# Patient Record
Sex: FEMALE | Race: OTHER | Employment: OTHER | URBAN - METROPOLITAN AREA
[De-identification: names, ages, dates, MRNs, and addresses within clinical notes are randomized per-mention and may not be internally consistent; named-entity substitution may affect disease eponyms.]

---

## 2017-01-09 RX ORDER — LOSARTAN POTASSIUM 100 MG/1
TABLET ORAL
Qty: 90 TABLET | Refills: 0 | Status: SHIPPED | OUTPATIENT
Start: 2017-01-09 | End: 2017-04-12

## 2017-01-10 ENCOUNTER — TELEPHONE (OUTPATIENT)
Dept: INTERNAL MEDICINE CLINIC | Facility: CLINIC | Age: 62
End: 2017-01-10

## 2017-02-13 RX ORDER — FUROSEMIDE 20 MG/1
TABLET ORAL
Qty: 90 TABLET | Refills: 0 | Status: SHIPPED | OUTPATIENT
Start: 2017-02-13 | End: 2018-10-30

## 2017-02-16 ENCOUNTER — TELEPHONE (OUTPATIENT)
Dept: INTERNAL MEDICINE CLINIC | Facility: CLINIC | Age: 62
End: 2017-02-16

## 2017-02-16 NOTE — TELEPHONE ENCOUNTER
Gennaro Kennedy MD  Diplomate, American Board of Internal Medicine  [facility]  [address] [facility],4Th Floor, [address], [address], [address] [place] [address]  T: [id number]; F: Chata 5

## 2017-02-16 NOTE — TELEPHONE ENCOUNTER
[facility] called for prior auth on Indomethacin. Prior auth form to be faxed to Dr Daria Hawley.

## 2017-04-10 ENCOUNTER — OFFICE VISIT (OUTPATIENT)
Dept: INTERNAL MEDICINE CLINIC | Facility: CLINIC | Age: 62
End: 2017-04-10

## 2017-04-10 ENCOUNTER — TELEPHONE (OUTPATIENT)
Dept: INTERNAL MEDICINE CLINIC | Facility: CLINIC | Age: 62
End: 2017-04-10

## 2017-04-10 VITALS
OXYGEN SATURATION: 95 % | BODY MASS INDEX: 33 KG/M2 | RESPIRATION RATE: 13 BRPM | SYSTOLIC BLOOD PRESSURE: 130 MMHG | WEIGHT: 205 LBS | HEART RATE: 78 BPM | TEMPERATURE: 98 F | DIASTOLIC BLOOD PRESSURE: 84 MMHG

## 2017-04-10 DIAGNOSIS — N39.0 ACUTE UTI: Primary | ICD-10-CM

## 2017-04-10 PROCEDURE — 87086 URINE CULTURE/COLONY COUNT: CPT | Performed by: INTERNAL MEDICINE

## 2017-04-10 PROCEDURE — 99213 OFFICE O/P EST LOW 20 MIN: CPT | Performed by: INTERNAL MEDICINE

## 2017-04-10 RX ORDER — PHENAZOPYRIDINE HYDROCHLORIDE 200 MG/1
200 TABLET, FILM COATED ORAL 3 TIMES DAILY PRN
Qty: 9 TABLET | Refills: 0 | Status: SHIPPED | OUTPATIENT
Start: 2017-04-10 | End: 2017-06-13 | Stop reason: ALTCHOICE

## 2017-04-10 RX ORDER — LEVOFLOXACIN 500 MG/1
500 TABLET, FILM COATED ORAL DAILY
Qty: 3 TABLET | Refills: 0 | Status: SHIPPED | OUTPATIENT
Start: 2017-04-10 | End: 2017-04-20

## 2017-04-10 NOTE — PROGRESS NOTES
Patient presents with:  UTI: since Friday/   Low Back Pain      HPI: The pt presents today for 3 days of urinary urgency, frequency, dysuria, and some low back pain. She did have brief (1 day and 1 episode) of hematuria.   She's had UTI in the past, but sh plan as outlined above. Patient was also afforded the time and opportunity to ask questions, which were then answered to the best of my ability. Malachi Love. Trinh Us MD  Diplomate, American Board of Internal Medicine  [facility]  [address] [place]

## 2017-04-12 RX ORDER — LOSARTAN POTASSIUM 100 MG/1
TABLET ORAL
Qty: 90 TABLET | Refills: 0 | Status: SHIPPED | OUTPATIENT
Start: 2017-04-12 | End: 2017-09-03

## 2017-04-14 ENCOUNTER — APPOINTMENT (OUTPATIENT)
Dept: CT IMAGING | Age: 62
End: 2017-04-14
Attending: EMERGENCY MEDICINE
Payer: MEDICARE

## 2017-04-14 ENCOUNTER — HOSPITAL ENCOUNTER (EMERGENCY)
Age: 62
Discharge: HOME | End: 2017-04-14
Attending: EMERGENCY MEDICINE
Payer: MEDICARE

## 2017-04-14 VITALS
HEIGHT: 66 IN | SYSTOLIC BLOOD PRESSURE: 120 MMHG | HEART RATE: 68 BPM | BODY MASS INDEX: 32.14 KG/M2 | WEIGHT: 200 LBS | RESPIRATION RATE: 18 BRPM | DIASTOLIC BLOOD PRESSURE: 66 MMHG | TEMPERATURE: 98 F | OXYGEN SATURATION: 100 %

## 2017-04-14 DIAGNOSIS — N23 RENAL COLIC: ICD-10-CM

## 2017-04-14 DIAGNOSIS — N13.2 HYDRONEPHROSIS WITH URINARY OBSTRUCTION DUE TO RENAL CALCULUS: ICD-10-CM

## 2017-04-14 DIAGNOSIS — N20.0 KIDNEY STONE: Primary | ICD-10-CM

## 2017-04-14 PROCEDURE — 74176 CT ABD & PELVIS W/O CONTRAST: CPT

## 2017-04-14 PROCEDURE — 81001 URINALYSIS AUTO W/SCOPE: CPT | Performed by: EMERGENCY MEDICINE

## 2017-04-14 PROCEDURE — 96375 TX/PRO/DX INJ NEW DRUG ADDON: CPT

## 2017-04-14 PROCEDURE — 99284 EMERGENCY DEPT VISIT MOD MDM: CPT

## 2017-04-14 PROCEDURE — 80048 BASIC METABOLIC PNL TOTAL CA: CPT | Performed by: EMERGENCY MEDICINE

## 2017-04-14 PROCEDURE — 96374 THER/PROPH/DIAG INJ IV PUSH: CPT

## 2017-04-14 PROCEDURE — 85025 COMPLETE CBC W/AUTO DIFF WBC: CPT | Performed by: EMERGENCY MEDICINE

## 2017-04-14 PROCEDURE — 96361 HYDRATE IV INFUSION ADD-ON: CPT

## 2017-04-14 PROCEDURE — 99285 EMERGENCY DEPT VISIT HI MDM: CPT

## 2017-04-14 RX ORDER — ONDANSETRON 2 MG/ML
4 INJECTION INTRAMUSCULAR; INTRAVENOUS ONCE
Status: COMPLETED | OUTPATIENT
Start: 2017-04-14 | End: 2017-04-14

## 2017-04-14 RX ORDER — ONDANSETRON 4 MG/1
4 TABLET, ORALLY DISINTEGRATING ORAL EVERY 4 HOURS PRN
Qty: 10 TABLET | Refills: 0 | Status: SHIPPED | OUTPATIENT
Start: 2017-04-14 | End: 2017-04-21

## 2017-04-14 RX ORDER — HYDROMORPHONE HYDROCHLORIDE 1 MG/ML
0.5 INJECTION, SOLUTION INTRAMUSCULAR; INTRAVENOUS; SUBCUTANEOUS ONCE
Status: COMPLETED | OUTPATIENT
Start: 2017-04-14 | End: 2017-04-14

## 2017-04-14 RX ORDER — CEPHALEXIN 500 MG/1
500 CAPSULE ORAL 2 TIMES DAILY
Qty: 40 CAPSULE | Refills: 0 | Status: SHIPPED | OUTPATIENT
Start: 2017-04-14 | End: 2017-04-19

## 2017-04-14 RX ORDER — HYDROCODONE BITARTRATE AND ACETAMINOPHEN 5; 325 MG/1; MG/1
1-2 TABLET ORAL EVERY 4 HOURS PRN
Qty: 20 TABLET | Refills: 0 | Status: SHIPPED | OUTPATIENT
Start: 2017-04-14 | End: 2017-06-13 | Stop reason: ALTCHOICE

## 2017-04-14 RX ORDER — KETOROLAC TROMETHAMINE 30 MG/ML
30 INJECTION, SOLUTION INTRAMUSCULAR; INTRAVENOUS ONCE
Status: COMPLETED | OUTPATIENT
Start: 2017-04-14 | End: 2017-04-14

## 2017-04-14 NOTE — ED INITIAL ASSESSMENT (HPI)
Pt has had r flank pain radiating to her r abdomen, pt has had kidney stones in the past about 6 yrs ago. Pt states she has had blood in her urine.

## 2017-04-14 NOTE — ED PROVIDER NOTES
[facility] [place]    History   Patient presents with:  Abdomen/Flank Pain (GI/)    Stated Complaint: recent kidney infection, left flank pain    HPI    Patient is a 68-year-old female who has a history of remote TAKE 1 TABLET BY MOUTH ONCE DAILY. Phenazopyridine HCl 200 MG Oral Tab,  Take 1 tablet (200 mg total) by mouth 3 (three) times daily as needed for Pain.    FUROSEMIDE 20 MG Oral Tab,  TAKE 1 TABLET BY MOUTH EVERY DAY AS NEEDED FOR LEG SWELLING   BETAMETH 04/14/17 1023 20   Temp 04/14/17 1023 97.5 °F (36.4 °C)   Temp src 04/14/17 1023 Temporal   SpO2 04/14/17 1023 100 %   O2 Device 04/14/17 1023 None (Room air)       Current:/66 mmHg  Pulse 68  Temp(Src) 97.5 °F (36.4 °C) (Temporal)  Resp 18  Ht 167. 6 these tests on the individual orders. CT scan abdomen pelvis: Left distal 5 mm stone. Right renal nonobstructing stones. Report reviewed and discussed with patient  MDM   Patient with flank pain, hematuria.   Possible kidney stone versus partial treat

## 2017-05-01 ENCOUNTER — OFFICE VISIT (OUTPATIENT)
Dept: INTERNAL MEDICINE CLINIC | Facility: CLINIC | Age: 62
End: 2017-05-01

## 2017-05-01 VITALS
BODY MASS INDEX: 33.5 KG/M2 | HEIGHT: 65.5 IN | SYSTOLIC BLOOD PRESSURE: 116 MMHG | DIASTOLIC BLOOD PRESSURE: 78 MMHG | TEMPERATURE: 98 F | RESPIRATION RATE: 16 BRPM | OXYGEN SATURATION: 96 % | WEIGHT: 203.5 LBS | HEART RATE: 82 BPM

## 2017-05-01 DIAGNOSIS — N20.0 NEPHROLITHIASIS: ICD-10-CM

## 2017-05-01 DIAGNOSIS — I10 ESSENTIAL HYPERTENSION: ICD-10-CM

## 2017-05-01 DIAGNOSIS — Z01.818 PREOPERATIVE EXAMINATION: Primary | ICD-10-CM

## 2017-05-01 DIAGNOSIS — E89.0 POSTOPERATIVE HYPOTHYROIDISM: ICD-10-CM

## 2017-05-01 DIAGNOSIS — E66.9 OBESITY, CLASS I, BMI 30-34.9: ICD-10-CM

## 2017-05-01 PROCEDURE — 99214 OFFICE O/P EST MOD 30 MIN: CPT | Performed by: INTERNAL MEDICINE

## 2017-05-01 PROCEDURE — 93000 ELECTROCARDIOGRAM COMPLETE: CPT | Performed by: INTERNAL MEDICINE

## 2017-05-01 RX ORDER — TACROLIMUS 1 MG/G
OINTMENT TOPICAL
Refills: 0 | COMMUNITY
Start: 2017-03-10 | End: 2017-05-01 | Stop reason: ALTCHOICE

## 2017-05-01 RX ORDER — LEVOTHYROXINE SODIUM 175 MCG
175 TABLET ORAL
COMMUNITY
Start: 2017-04-10 | End: 2018-07-26

## 2017-05-01 RX ORDER — PROCHLORPERAZINE MALEATE 10 MG
TABLET ORAL
COMMUNITY
Start: 2017-04-14 | End: 2017-05-01 | Stop reason: ALTCHOICE

## 2017-05-01 NOTE — PROGRESS NOTES
Ana María Barrera is a 64year old female who presents for a pre-operative physical exam.   Ana María Barrera is scheduled for a cystoscopy,ureteroscopy procedure at [facility] on 5/4/17 performed by Dr Duke Elam, who has requested that I provide pre-opera yrs ago   • Kidney stone    • Anesthesia complication    • PONV (postoperative nausea and vomiting)      Past Surgical History   Procedure Laterality Date   • Other surgical history  1/1/2005     thyroid surgery XRT   • Sherman needle localization w/ specimen (two) times daily as needed (pain). , Disp: 180 capsule, Rfl: 1  •  Esomeprazole Magnesium (NEXIUM) 40 MG Oral Capsule Delayed Release, Take 1 capsule (40 mg total) by mouth every morning before breakfast., Disp: 30 capsule, Rfl: 5  •  alprazolam 0.25 MG Or an intermediate risk procedure, and is ok to proceed with surgery without any further testing. Note and pertinent pre-operative testing results will be faxed to referring surgeon's office and/or surgical center as requested.     2.  Nephrolithiasis  Bilate

## 2017-05-04 ENCOUNTER — HOSPITAL ENCOUNTER (OUTPATIENT)
Facility: HOSPITAL | Age: 62
Setting detail: HOSPITAL OUTPATIENT SURGERY
Discharge: HOME | End: 2017-05-04
Attending: UROLOGY | Admitting: UROLOGY
Payer: MEDICARE

## 2017-05-04 ENCOUNTER — APPOINTMENT (OUTPATIENT)
Dept: GENERAL RADIOLOGY | Facility: HOSPITAL | Age: 62
End: 2017-05-04
Attending: UROLOGY
Payer: MEDICARE

## 2017-05-04 ENCOUNTER — SURGERY (OUTPATIENT)
Age: 62
End: 2017-05-04

## 2017-05-04 VITALS
HEIGHT: 66 IN | OXYGEN SATURATION: 100 % | HEART RATE: 75 BPM | BODY MASS INDEX: 32.59 KG/M2 | DIASTOLIC BLOOD PRESSURE: 73 MMHG | RESPIRATION RATE: 18 BRPM | SYSTOLIC BLOOD PRESSURE: 132 MMHG | WEIGHT: 202.81 LBS | TEMPERATURE: 97 F

## 2017-05-04 DIAGNOSIS — R10.9 LEFT FLANK PAIN: Primary | ICD-10-CM

## 2017-05-04 PROCEDURE — 0TJ98ZZ INSPECTION OF URETER, VIA NATURAL OR ARTIFICIAL OPENING ENDOSCOPIC: ICD-10-PCS | Performed by: UROLOGY

## 2017-05-04 PROCEDURE — 74420 UROGRAPHY RTRGR +-KUB: CPT | Performed by: UROLOGY

## 2017-05-04 RX ORDER — HYDROMORPHONE HYDROCHLORIDE 1 MG/ML
0.4 INJECTION, SOLUTION INTRAMUSCULAR; INTRAVENOUS; SUBCUTANEOUS EVERY 5 MIN PRN
Status: DISCONTINUED | OUTPATIENT
Start: 2017-05-04 | End: 2017-05-04

## 2017-05-04 RX ORDER — NALOXONE HYDROCHLORIDE 0.4 MG/ML
80 INJECTION, SOLUTION INTRAMUSCULAR; INTRAVENOUS; SUBCUTANEOUS AS NEEDED
Status: DISCONTINUED | OUTPATIENT
Start: 2017-05-04 | End: 2017-05-04

## 2017-05-04 RX ORDER — HYDROMORPHONE HYDROCHLORIDE 1 MG/ML
INJECTION, SOLUTION INTRAMUSCULAR; INTRAVENOUS; SUBCUTANEOUS
Status: COMPLETED
Start: 2017-05-04 | End: 2017-05-04

## 2017-05-04 RX ORDER — MEPERIDINE HYDROCHLORIDE 25 MG/ML
12.5 INJECTION INTRAMUSCULAR; INTRAVENOUS; SUBCUTANEOUS AS NEEDED
Status: DISCONTINUED | OUTPATIENT
Start: 2017-05-04 | End: 2017-05-04

## 2017-05-04 RX ORDER — DIPHENHYDRAMINE HYDROCHLORIDE 50 MG/ML
12.5 INJECTION INTRAMUSCULAR; INTRAVENOUS AS NEEDED
Status: DISCONTINUED | OUTPATIENT
Start: 2017-05-04 | End: 2017-05-04

## 2017-05-04 RX ORDER — ONDANSETRON 2 MG/ML
4 INJECTION INTRAMUSCULAR; INTRAVENOUS AS NEEDED
Status: DISCONTINUED | OUTPATIENT
Start: 2017-05-04 | End: 2017-05-04

## 2017-05-04 RX ORDER — SODIUM CHLORIDE, SODIUM LACTATE, POTASSIUM CHLORIDE, CALCIUM CHLORIDE 600; 310; 30; 20 MG/100ML; MG/100ML; MG/100ML; MG/100ML
INJECTION, SOLUTION INTRAVENOUS CONTINUOUS
Status: DISCONTINUED | OUTPATIENT
Start: 2017-05-04 | End: 2017-05-04

## 2017-05-04 RX ORDER — MORPHINE SULFATE 2 MG/ML
2 INJECTION, SOLUTION INTRAMUSCULAR; INTRAVENOUS EVERY 5 MIN PRN
Status: DISCONTINUED | OUTPATIENT
Start: 2017-05-04 | End: 2017-05-04

## 2017-05-04 RX ORDER — LIDOCAINE HYDROCHLORIDE 20 MG/ML
JELLY TOPICAL AS NEEDED
Status: DISCONTINUED | OUTPATIENT
Start: 2017-05-04 | End: 2017-05-04 | Stop reason: HOSPADM

## 2017-05-04 RX ORDER — METOCLOPRAMIDE HYDROCHLORIDE 5 MG/ML
10 INJECTION INTRAMUSCULAR; INTRAVENOUS AS NEEDED
Status: DISCONTINUED | OUTPATIENT
Start: 2017-05-04 | End: 2017-05-04

## 2017-05-04 NOTE — H&P
History and Physical note by Dr. Gino Rose   from 5/1/17 reviewed. Any changes noted below:    Has not passed stone.   Still has some pains, even occ to the right side  No fever, chills  No nausea  No hematuria, dysuria  But still some left sided pain

## 2017-05-04 NOTE — BRIEF OP NOTE
Pre-Operative Diagnosis: LEFT URETERAL STONE       Post-Operative Diagnosis: LEFT PASSED  CALCULI, RIGHT FLANK PAIN     Procedure Performed:   Procedure(s):  CYSTOSCOPY, LEFT URETEROSCOPY,  BILATERAL RETROGRADE PYELOGRAMS    Surgeon(s) and Role:     * U

## 2017-05-05 NOTE — OPERATIVE REPORT
[facility]    PATIENT'S NAME: Keon Francis   ATTENDING PHYSICIAN: All Bergeron M.D. OPERATING PHYSICIAN: All Bergeron M.D.    PATIENT ACCOUNT#:   [de-identified]    [facility]  MEDICAL RECORD #:   [id number]       DATE OF BIRTH clear.  Panendoscopy of the bladder harbored no lesions. There were no bladder tumors, diverticula, stones, or trabeculation. There was no cystitis.   The ureteral orifices were normal.  The left ureteral orifice was cannulated with an open-ended catheter 22:44:37  [facility] [id number]  [place]/

## 2017-06-08 RX ORDER — INDOMETHACIN 50 MG/1
CAPSULE ORAL
Qty: 180 CAPSULE | Refills: 1 | Status: SHIPPED | OUTPATIENT
Start: 2017-06-08 | End: 2017-06-13

## 2017-06-13 PROBLEM — F51.02 ADJUSTMENT INSOMNIA: Status: ACTIVE | Noted: 2017-06-13

## 2017-06-13 PROBLEM — M47.816 ARTHRITIS, LUMBAR SPINE: Status: ACTIVE | Noted: 2017-06-13

## 2017-06-13 PROBLEM — M54.50 CHRONIC BILATERAL LOW BACK PAIN WITHOUT SCIATICA: Status: ACTIVE | Noted: 2017-06-13

## 2017-06-13 PROBLEM — G89.29 CHRONIC BILATERAL LOW BACK PAIN WITHOUT SCIATICA: Status: ACTIVE | Noted: 2017-06-13

## 2017-06-13 NOTE — PROGRESS NOTES
Patient presents with: Other: Anxiety, insomnia, back pain      HPI: The pt presents today for eval of 3 issues:  1. Anxiety - Onset = relatively recent within the past few months. She received notice that she is to be deported from the [place].   Berto Langston MG Oral Cap Take 1 capsule (50 mg total) by mouth 2 (two) times daily as needed (pain).  Disp: 180 capsule Rfl: 1   Esomeprazole Magnesium (NEXIUM) 40 MG Oral Capsule Delayed Release Take 1 capsule (40 mg total) by mouth every morning before breakfast. Disp [id number] SPINE LUMBOSACRAL COMPL W/ OBL     PROCEDURE:     RADIOGRAPH OF THE LUMBAR SPINE (ROUTINE 5 VIEWS) WITH  OBLIQUE, AP/LATERAL, AND CONED DOWN VIEWS OF L5-S1     TECHNIQUE:     AP, lateral, oblique, and coned down L5-S1 views were  obtained.     CO which work most of the time but not recently. Desires alternate therapy. Not recent falls. Pain severity is moderate to severe and worse w/ loading and ROM of the spine itself. She cannot undergo formal PT at this time b/c of legal fight on issue #1.

## 2017-08-10 ENCOUNTER — TELEPHONE (OUTPATIENT)
Dept: INTERNAL MEDICINE CLINIC | Facility: CLINIC | Age: 62
End: 2017-08-10

## 2017-08-10 NOTE — TELEPHONE ENCOUNTER
Patient called to cancel appt. Explained same day cancellation is a missed appointment. Also explained this is her second NO SHOW within a year and there will be a $50.00 charge. Patient did not reschedule at this time.      First NO SHOW: 08/25/2016  Sec

## 2017-09-05 RX ORDER — LOSARTAN POTASSIUM 100 MG/1
TABLET ORAL
Qty: 90 TABLET | Refills: 0 | Status: SHIPPED | OUTPATIENT
Start: 2017-09-05 | End: 2017-12-07

## 2017-09-28 ENCOUNTER — HOSPITAL ENCOUNTER (OUTPATIENT)
Age: 62
Discharge: HOME | End: 2017-09-28
Attending: FAMILY MEDICINE
Payer: MEDICARE

## 2017-09-28 ENCOUNTER — APPOINTMENT (OUTPATIENT)
Dept: CT IMAGING | Age: 62
End: 2017-09-28
Attending: FAMILY MEDICINE
Payer: MEDICARE

## 2017-09-28 VITALS
WEIGHT: 200 LBS | SYSTOLIC BLOOD PRESSURE: 148 MMHG | OXYGEN SATURATION: 98 % | TEMPERATURE: 98 F | HEART RATE: 63 BPM | BODY MASS INDEX: 33 KG/M2 | RESPIRATION RATE: 18 BRPM | DIASTOLIC BLOOD PRESSURE: 77 MMHG

## 2017-09-28 DIAGNOSIS — R11.2 NAUSEA AND VOMITING IN ADULT: ICD-10-CM

## 2017-09-28 DIAGNOSIS — R42 DIZZINESS: Primary | ICD-10-CM

## 2017-09-28 DIAGNOSIS — R51.9 ACUTE NONINTRACTABLE HEADACHE, UNSPECIFIED HEADACHE TYPE: ICD-10-CM

## 2017-09-28 PROCEDURE — 70450 CT HEAD/BRAIN W/O DYE: CPT | Performed by: FAMILY MEDICINE

## 2017-09-28 PROCEDURE — 80047 BASIC METABLC PNL IONIZED CA: CPT

## 2017-09-28 PROCEDURE — 96360 HYDRATION IV INFUSION INIT: CPT

## 2017-09-28 PROCEDURE — 99215 OFFICE O/P EST HI 40 MIN: CPT

## 2017-09-28 PROCEDURE — 99205 OFFICE O/P NEW HI 60 MIN: CPT

## 2017-09-28 PROCEDURE — 93005 ELECTROCARDIOGRAM TRACING: CPT

## 2017-09-28 PROCEDURE — 93010 ELECTROCARDIOGRAM REPORT: CPT

## 2017-09-28 PROCEDURE — 85025 COMPLETE CBC W/AUTO DIFF WBC: CPT | Performed by: FAMILY MEDICINE

## 2017-09-28 RX ORDER — ONDANSETRON 4 MG/1
TABLET, ORALLY DISINTEGRATING ORAL
Qty: 12 TABLET | Refills: 0 | Status: SHIPPED | OUTPATIENT
Start: 2017-09-28 | End: 2018-02-20

## 2017-09-28 RX ORDER — ONDANSETRON 4 MG/1
4 TABLET, ORALLY DISINTEGRATING ORAL ONCE
Status: COMPLETED | OUTPATIENT
Start: 2017-09-28 | End: 2017-09-28

## 2017-09-28 RX ORDER — SODIUM CHLORIDE 9 MG/ML
1000 INJECTION, SOLUTION INTRAVENOUS ONCE
Status: COMPLETED | OUTPATIENT
Start: 2017-09-28 | End: 2017-09-28

## 2017-09-28 RX ORDER — ACETAMINOPHEN 500 MG
1000 TABLET ORAL ONCE
Status: COMPLETED | OUTPATIENT
Start: 2017-09-28 | End: 2017-09-28

## 2017-09-28 RX ORDER — MECLIZINE HCL 12.5 MG/1
25 TABLET ORAL ONCE
Status: COMPLETED | OUTPATIENT
Start: 2017-09-28 | End: 2017-09-28

## 2017-09-28 RX ORDER — MECLIZINE HYDROCHLORIDE 25 MG/1
25 TABLET ORAL 3 TIMES DAILY PRN
Qty: 30 TABLET | Refills: 0 | Status: SHIPPED | OUTPATIENT
Start: 2017-09-28 | End: 2018-02-20

## 2017-09-28 NOTE — ED PROVIDER NOTES
Patient Seen in: [facility]    History   Patient presents with:  Blood Pressure  Dizziness  Headache    Stated Complaint: high blood pressure with dizziness and nausea x 3 days    HPI    This 58-year-old female with a known hist Comment: thyroid surgery XRT    Family History   Problem Relation Age of Onset   • Heart Disease Father    • Heart Disease Mother        Smoking status: Never Smoker                                                              Smokeless tobacco: Never U EKG    Rate, intervals and axes as noted on EKG Report. Rate: 73  Rhythm: Sinus Rhythm  Reading: Normal Sinus Rhythm           Ct Brain Or Head (42361)    Result Date: 9/28/2017  PROCEDURE:  CT BRAIN OR HEAD (80127)  COMPARISON:  None.   INDICATIONS: effects are reviewed. She is instructed to go the emergency room if she has any worsening symptoms. She is to follow-up with her primary doctor in 2-3 days if not improving. Fall precautions are reviewed with the patient.           Disposition and Plan

## 2017-09-28 NOTE — ED INITIAL ASSESSMENT (HPI)
C/o dizziness, headache and nauseous for 3 days. Has elevate blood pressure, room feels like spinning and with some blurry vision.

## 2017-10-03 ENCOUNTER — OFFICE VISIT (OUTPATIENT)
Dept: INTERNAL MEDICINE CLINIC | Facility: CLINIC | Age: 62
End: 2017-10-03

## 2017-10-03 VITALS
DIASTOLIC BLOOD PRESSURE: 78 MMHG | SYSTOLIC BLOOD PRESSURE: 130 MMHG | TEMPERATURE: 98 F | HEIGHT: 65.5 IN | HEART RATE: 88 BPM | OXYGEN SATURATION: 98 % | RESPIRATION RATE: 16 BRPM | WEIGHT: 207.5 LBS | BODY MASS INDEX: 34.16 KG/M2

## 2017-10-03 DIAGNOSIS — F41.1 GAD (GENERALIZED ANXIETY DISORDER): ICD-10-CM

## 2017-10-03 DIAGNOSIS — H40.9 GLAUCOMA, UNSPECIFIED GLAUCOMA TYPE, UNSPECIFIED LATERALITY: ICD-10-CM

## 2017-10-03 DIAGNOSIS — F51.04 CHRONIC INSOMNIA: ICD-10-CM

## 2017-10-03 DIAGNOSIS — R60.9 PERIPHERAL EDEMA: ICD-10-CM

## 2017-10-03 DIAGNOSIS — K21.9 GASTROESOPHAGEAL REFLUX DISEASE WITHOUT ESOPHAGITIS: ICD-10-CM

## 2017-10-03 DIAGNOSIS — Z00.00 ENCOUNTER FOR ANNUAL HEALTH EXAMINATION: Primary | ICD-10-CM

## 2017-10-03 DIAGNOSIS — E66.9 OBESITY, CLASS I, BMI 30-34.9: ICD-10-CM

## 2017-10-03 DIAGNOSIS — Z13.31 DEPRESSION SCREENING: ICD-10-CM

## 2017-10-03 DIAGNOSIS — Z12.31 ENCOUNTER FOR SCREENING MAMMOGRAM FOR BREAST CANCER: ICD-10-CM

## 2017-10-03 DIAGNOSIS — I10 ESSENTIAL HYPERTENSION: ICD-10-CM

## 2017-10-03 DIAGNOSIS — R42 VERTIGO: ICD-10-CM

## 2017-10-03 DIAGNOSIS — E89.0 POSTOPERATIVE HYPOTHYROIDISM: ICD-10-CM

## 2017-10-03 DIAGNOSIS — M85.89 OSTEOPENIA OF MULTIPLE SITES: ICD-10-CM

## 2017-10-03 DIAGNOSIS — M15.9 PRIMARY OSTEOARTHRITIS INVOLVING MULTIPLE JOINTS: ICD-10-CM

## 2017-10-03 PROBLEM — G89.29 CHRONIC BILATERAL LOW BACK PAIN WITHOUT SCIATICA: Status: RESOLVED | Noted: 2017-06-13 | Resolved: 2017-10-03

## 2017-10-03 PROBLEM — M54.50 CHRONIC BILATERAL LOW BACK PAIN WITHOUT SCIATICA: Status: RESOLVED | Noted: 2017-06-13 | Resolved: 2017-10-03

## 2017-10-03 PROBLEM — F51.02 ADJUSTMENT INSOMNIA: Status: RESOLVED | Noted: 2017-06-13 | Resolved: 2017-10-03

## 2017-10-03 PROBLEM — M47.816 ARTHRITIS, LUMBAR SPINE: Status: RESOLVED | Noted: 2017-06-13 | Resolved: 2017-10-03

## 2017-10-03 PROCEDURE — G0444 DEPRESSION SCREEN ANNUAL: HCPCS | Performed by: INTERNAL MEDICINE

## 2017-10-03 PROCEDURE — 99214 OFFICE O/P EST MOD 30 MIN: CPT | Performed by: INTERNAL MEDICINE

## 2017-10-03 PROCEDURE — G0439 PPPS, SUBSEQ VISIT: HCPCS | Performed by: INTERNAL MEDICINE

## 2017-10-03 RX ORDER — CALCITRIOL 0.5 UG/1
0.5 CAPSULE, LIQUID FILLED ORAL
Qty: 90 CAPSULE | Refills: 1 | Status: SHIPPED | OUTPATIENT
Start: 2017-10-03 | End: 2018-02-26

## 2017-10-03 RX ORDER — ESOMEPRAZOLE MAGNESIUM 40 MG/1
40 CAPSULE, DELAYED RELEASE ORAL
Qty: 30 CAPSULE | Refills: 5 | Status: SHIPPED | OUTPATIENT
Start: 2017-10-03 | End: 2018-07-20

## 2017-10-04 NOTE — PROGRESS NOTES
HPI:   Luis M Burr is a 58year old female who presents for a subsequent AWV and 6-month f/u chronic medical conditions and disease burden status eval.  Specifically, her chronic conditions are as follows:    1. HTN - Stable on prescription medication. 05/23/2016   TSH 0.373 03/02/2016   CREATSERUM 0.79 04/14/2017   GLU 99 04/14/2017        CBC  (most recent labs)     Lab Results  Component Value Date   WBC 8.3 04/14/2017   HGB 13.6 04/14/2017   .0 04/14/2017        ALLERGIES:   She has No Known A in joint, ankle and foot (9/14/2011); Personal history of malignant neoplasm of thyroid (11/17/2011); Personal history of urinary (tract) infection (7/21/2011); PONV (postoperative nausea and vomiting); and Screening for malignant neoplasm of the cervix. Acuity: 20/20   Both Eyes Visual Acuity: Corrected Both Eyes Chart Acuity: 20/20   Able To Tolerate Visual Acuity: Yes      Gen - A&Ox3, NAD, obese  HEENT - PERRL, EOMI, OP is clear; TMs clear B  Neck - supple, no JVD, no thyromegaly; 2+ carotids and no br aspirin. I've never advised her to do so. Diet assessment: Fair     Advanced Directive:  Living Will on file in [place]? Camille Jernigan does not have a Living Will on file in [place]. Resources given.        Healthcare Power of  on file in Epic: you are a male age 38-65 or a female age 47-67, do you take aspirin?: No    Have you had any immunizations at another office such as Influenza, Hepatitis B, Tetanus, or Pneumococcal?: No     Functional Ability     Bathing or Showering: Able without help Correct    Recall \"Ball\": Correct    Recall \"Flag\": Correct    Recall \"Tree\":  Incorrect       This section provided for quick review of chart, separate sheet to patient  [address] Internal Lab or Procedure Extern applicable)     Influenza  Covered Annually  Please get every year    Pneumococcal 13 (Prevnar)  Covered Once after 65 No vaccine history found Please get once after your 65th birthday    Pneumococcal 23 (Pneumovax)  Covered Once after 65 No vaccine histor 08/22/2013 86     LDL Cholesterol (mg/dL)   Date Value   05/23/2016 85    No flowsheet data found. Dilated Eye exam  Annually No flowsheet data found. No flowsheet data found.     COPD      Spirometry Testing Annually Spirometry date:  No flowsheet d

## 2017-10-09 ENCOUNTER — APPOINTMENT (OUTPATIENT)
Dept: LAB | Age: 62
End: 2017-10-09
Attending: INTERNAL MEDICINE
Payer: MEDICARE

## 2017-10-09 ENCOUNTER — HOSPITAL ENCOUNTER (OUTPATIENT)
Dept: MAMMOGRAPHY | Age: 62
Discharge: HOME | End: 2017-10-09
Attending: INTERNAL MEDICINE
Payer: MEDICARE

## 2017-10-09 DIAGNOSIS — M85.89 OSTEOPENIA OF MULTIPLE SITES: ICD-10-CM

## 2017-10-09 DIAGNOSIS — I10 ESSENTIAL HYPERTENSION: ICD-10-CM

## 2017-10-09 DIAGNOSIS — Z12.31 ENCOUNTER FOR SCREENING MAMMOGRAM FOR BREAST CANCER: ICD-10-CM

## 2017-10-09 DIAGNOSIS — E89.0 POSTOPERATIVE HYPOTHYROIDISM: ICD-10-CM

## 2017-10-09 PROCEDURE — 82043 UR ALBUMIN QUANTITATIVE: CPT

## 2017-10-09 PROCEDURE — 36415 COLL VENOUS BLD VENIPUNCTURE: CPT

## 2017-10-09 PROCEDURE — 77067 SCR MAMMO BI INCL CAD: CPT | Performed by: INTERNAL MEDICINE

## 2017-10-09 PROCEDURE — 82306 VITAMIN D 25 HYDROXY: CPT

## 2017-10-09 PROCEDURE — 84443 ASSAY THYROID STIM HORMONE: CPT

## 2017-10-09 PROCEDURE — 84439 ASSAY OF FREE THYROXINE: CPT

## 2017-10-09 PROCEDURE — 80053 COMPREHEN METABOLIC PANEL: CPT

## 2017-10-09 PROCEDURE — 77063 BREAST TOMOSYNTHESIS BI: CPT | Performed by: INTERNAL MEDICINE

## 2017-10-09 PROCEDURE — 80061 LIPID PANEL: CPT

## 2017-10-09 PROCEDURE — 82570 ASSAY OF URINE CREATININE: CPT

## 2017-11-02 ENCOUNTER — OFFICE VISIT (OUTPATIENT)
Dept: NEUROLOGY | Facility: CLINIC | Age: 62
End: 2017-11-02

## 2017-11-02 VITALS
SYSTOLIC BLOOD PRESSURE: 122 MMHG | WEIGHT: 207.5 LBS | HEIGHT: 65.5 IN | BODY MASS INDEX: 34.16 KG/M2 | OXYGEN SATURATION: 97 % | DIASTOLIC BLOOD PRESSURE: 80 MMHG | RESPIRATION RATE: 18 BRPM | TEMPERATURE: 98 F | HEART RATE: 93 BPM

## 2017-11-02 DIAGNOSIS — R51.9 NEW ONSET HEADACHE: ICD-10-CM

## 2017-11-02 DIAGNOSIS — R42 VERTIGO: ICD-10-CM

## 2017-11-02 DIAGNOSIS — R42 POSTURAL DIZZINESS: Primary | ICD-10-CM

## 2017-11-02 PROCEDURE — 99204 OFFICE O/P NEW MOD 45 MIN: CPT | Performed by: OTHER

## 2017-11-02 NOTE — PROGRESS NOTES
HPI:    Patient ID: Miguel Chow is a 58year old female. PCP: Dr Rockne Najjar    Thank you for requesting this consultation to us. Below is the summary of my evaluation.     HPI    Miguel Chow is a 58year old pleasant female with history of Hypertension HISTORY      Comment: thyroid surgery XRT   Family History   Problem Relation Age of Onset   • Heart Disease Father    • Heart Disease Mother       Smoking status: Never Smoker                                                              Smokeless tobacco: times daily as needed (pain). Disp: 180 capsule Rfl: 1   alprazolam 0.25 MG Oral Tab Take 1 tablet (0.25 mg total) by mouth 2 (two) times daily as needed for Sleep or Anxiety.  Disp: 60 tablet Rfl: 5   Cyclobenzaprine HCl 10 MG Oral Tab Take 1 tablet (10 mg difficulty with tandem walk. TESTS/IMAGING:     CT head ( 9/28/2017)  FINDINGS:       VENTRICLES/SULCI:   Ventricles and sulci are normal in size. INTRACRANIAL:  There are no abnormal extraaxial fluid collections. There is no midline shift.   Ther

## 2017-11-07 ENCOUNTER — TELEPHONE (OUTPATIENT)
Dept: SURGERY | Facility: CLINIC | Age: 62
End: 2017-11-07

## 2017-11-07 NOTE — TELEPHONE ENCOUNTER
This is Dr. Angel Luis Garza patient. Pt requesting that MRI brain be done at the open MRI. Typically, open MRI is not desired for brain testing, as it is not as detailed. Contacted patient to determine why she is requesting open MRI.   She states she is cla

## 2017-11-08 NOTE — TELEPHONE ENCOUNTER
Per Dr Gabe Hernandez: Patient can ask for [facility] open MRI. LM on Vickie's vm informing her open MRI ok.

## 2017-11-17 ENCOUNTER — HOSPITAL ENCOUNTER (OUTPATIENT)
Dept: MRI IMAGING | Age: 62
Discharge: HOME | End: 2017-11-17
Attending: Other
Payer: MEDICARE

## 2017-11-17 DIAGNOSIS — R51.9 NEW ONSET HEADACHE: ICD-10-CM

## 2017-11-17 DIAGNOSIS — R42 VERTIGO: ICD-10-CM

## 2017-11-17 PROCEDURE — A9575 INJ GADOTERATE MEGLUMI 0.1ML: HCPCS | Performed by: OTHER

## 2017-11-17 PROCEDURE — 70553 MRI BRAIN STEM W/O & W/DYE: CPT | Performed by: OTHER

## 2017-11-20 ENCOUNTER — TELEPHONE (OUTPATIENT)
Dept: NEUROLOGY | Facility: CLINIC | Age: 62
End: 2017-11-20

## 2017-11-20 NOTE — TELEPHONE ENCOUNTER
----- Message from Yaritza Torres MD sent at 11/20/2017 10:59 AM CST -----  No acute infarction. Possible small meningioma in the right parietal area and not the cause of any of her symptoms.

## 2017-11-20 NOTE — TELEPHONE ENCOUNTER
Patient informed of below. Patient asking what is causing her symptoms. Advised follow up to discuss with Dr Rodney Mayberry further. Transferred to  to schedule.

## 2017-12-08 RX ORDER — LOSARTAN POTASSIUM 100 MG/1
TABLET ORAL
Qty: 90 TABLET | Refills: 0 | Status: SHIPPED | OUTPATIENT
Start: 2017-12-08 | End: 2018-02-20

## 2017-12-08 NOTE — TELEPHONE ENCOUNTER
Medication passed protocol.      Requesting Losartan 100 mg tabs   LOV: 10/3/17  Annual wellness exam  RTC: 6 months  Last Relevant Labs: CMP 10/9/17  Filled: Last sent as 90 with no refills on 9/5/17    Future Appointments  Date Time Provider Department Ce

## 2017-12-12 ENCOUNTER — OFFICE VISIT (OUTPATIENT)
Dept: NEUROLOGY | Facility: CLINIC | Age: 62
End: 2017-12-12

## 2017-12-12 VITALS
DIASTOLIC BLOOD PRESSURE: 80 MMHG | RESPIRATION RATE: 16 BRPM | WEIGHT: 210 LBS | BODY MASS INDEX: 34 KG/M2 | SYSTOLIC BLOOD PRESSURE: 138 MMHG | HEART RATE: 92 BPM

## 2017-12-12 DIAGNOSIS — Z86.73 OLD LACUNAR STROKE WITHOUT LATE EFFECT: ICD-10-CM

## 2017-12-12 DIAGNOSIS — R42 DIZZINESS: ICD-10-CM

## 2017-12-12 DIAGNOSIS — D32.9 MENINGIOMA (HCC): Primary | ICD-10-CM

## 2017-12-12 PROCEDURE — 99213 OFFICE O/P EST LOW 20 MIN: CPT | Performed by: OTHER

## 2017-12-12 NOTE — PATIENT INSTRUCTIONS
Refill policies:    • Allow 2-3 business days for refills; controlled substances may take longer.   • Contact your pharmacy at least 5 days prior to running out of medication and have them send an electronic request or submit request through the [facility] have a procedure or additional testing performed. [facility]) will contact your insurance carrier to obtain pre-certification or prior authorization.     Unfortunately, CHERELLE has seen an increase in denial of payment even though the p

## 2017-12-12 NOTE — PROGRESS NOTES
HPI:    Patient ID: Gloria Albert is a 58year old female. Dizziness   Pertinent negatives include no headaches, numbness or weakness. Patient presented for follow up for dizziness and vertigo.  States dizziness has improved and no longer had any v EDW  4/18/16: KNEE REPLACEMENT SURGERY Left      Comment: Performed by Dr. Berta Marion @ [facility]  35yrs back: [facility] NEEDLE LOCALIZATION W/ SPECIMEN 1 SITE LEFT      Comment: due to build up of milk duct.   1/1/2005: OTHER SURGICAL H total) by mouth 2 (two) times daily as needed (pain). Disp: 180 capsule Rfl: 1   alprazolam 0.25 MG Oral Tab Take 1 tablet (0.25 mg total) by mouth 2 (two) times daily as needed for Sleep or Anxiety.  Disp: 60 tablet Rfl: 5   Cyclobenzaprine HCl 10 MG Oral effect  Dizziness      MRI brain with and without contrast reviewed. Tiny right parietal meningioma, incidentally and not causing her any symptoms. Dizziness has improved significantly.  We recommend observation and repeat MRI brain in a year to assess stab

## 2017-12-25 DIAGNOSIS — F41.1 GAD (GENERALIZED ANXIETY DISORDER): ICD-10-CM

## 2017-12-26 NOTE — TELEPHONE ENCOUNTER
Alprazolam .25 mg  Filled 7-28-16 60 with 5 refills     Indomethacin 50 mg fileld 10-4-16 180 with 1 refill     LOV 10-3-17     Follow up in 6 months     Labs 10-9-17

## 2017-12-28 RX ORDER — INDOMETHACIN 50 MG/1
CAPSULE ORAL
Qty: 180 CAPSULE | Refills: 1 | Status: SHIPPED | OUTPATIENT
Start: 2017-12-28 | End: 2018-02-20

## 2017-12-28 RX ORDER — ALPRAZOLAM 0.25 MG/1
TABLET ORAL
Qty: 60 TABLET | Refills: 5 | Status: SHIPPED | OUTPATIENT
Start: 2017-12-28 | End: 2019-09-19

## 2018-01-23 ENCOUNTER — OFFICE VISIT (OUTPATIENT)
Dept: INTERNAL MEDICINE CLINIC | Facility: CLINIC | Age: 63
End: 2018-01-23

## 2018-01-23 VITALS
RESPIRATION RATE: 16 BRPM | DIASTOLIC BLOOD PRESSURE: 82 MMHG | TEMPERATURE: 98 F | SYSTOLIC BLOOD PRESSURE: 124 MMHG | OXYGEN SATURATION: 96 % | HEART RATE: 76 BPM | BODY MASS INDEX: 34 KG/M2 | WEIGHT: 207.5 LBS

## 2018-01-23 DIAGNOSIS — I10 ESSENTIAL HYPERTENSION: ICD-10-CM

## 2018-01-23 DIAGNOSIS — M85.89 OSTEOPENIA OF MULTIPLE SITES: ICD-10-CM

## 2018-01-23 DIAGNOSIS — Z86.73 HISTORY OF ARTERIAL ISCHEMIC STROKE: Primary | ICD-10-CM

## 2018-01-23 DIAGNOSIS — E89.0 POSTOPERATIVE HYPOTHYROIDISM: ICD-10-CM

## 2018-01-23 PROCEDURE — 99214 OFFICE O/P EST MOD 30 MIN: CPT | Performed by: INTERNAL MEDICINE

## 2018-01-23 RX ORDER — ATORVASTATIN CALCIUM 40 MG/1
40 TABLET, FILM COATED ORAL NIGHTLY
Qty: 90 TABLET | Refills: 3 | Status: SHIPPED | OUTPATIENT
Start: 2018-01-23 | End: 2018-07-20

## 2018-01-23 NOTE — PATIENT INSTRUCTIONS
Mis Doins,  1. El \"stress test\" del maya. 2. Vamos con la medicina nueva para no meterse con embolia y se llama ATORVASTATIN de 40 miligramas cada estephania. 3. El laboratorio en la primavera ([address] [place]).   4. La proxima praveen sera florecita semana despues de

## 2018-01-24 NOTE — PROGRESS NOTES
Patient presents with: Follow - Up: from seeing neurologist   Other: HTN, HypoTSH, Osteopenia      HPI: The pt presents today for eval of multiple issues:  1.  Hx of ischemic stroke - She was asymptomatic, but this was seen via MRI in 11/2017 in the [address] [place] [address] capsule Rfl: 1   Meclizine HCl 25 MG Oral Tab Take 1 tablet (25 mg total) by mouth 3 (three) times daily as needed for Dizziness.  Disp: 30 tablet Rfl: 0   SYNTHROID 175 MCG Oral Tab  Disp:  Rfl:    FUROSEMIDE 20 MG Oral Tab TAKE 1 TABLET BY MOUTH EVERY DAY clear  Neck - supple, no JVD, no thyromegaly; 2+ carotids and no bruits  Lungs - CTAB  CV - RRR, nl s1, s2  Abd - soft, NABS, NT, ND  Ext - no c/c/e  Neuro - CNs 2-12 grossly intact, no focal deficits; 2+ DTRs  Psych - nl mood/affect      A/P:  History of Imaging & Consults:  CARD TREADMILL STRESS, ADULT (CPT=93017)

## 2018-01-26 RX ORDER — INDOMETHACIN 50 MG/1
CAPSULE ORAL
Qty: 180 CAPSULE | Refills: 0 | OUTPATIENT
Start: 2018-01-26

## 2018-02-20 ENCOUNTER — OFFICE VISIT (OUTPATIENT)
Dept: INTERNAL MEDICINE CLINIC | Facility: CLINIC | Age: 63
End: 2018-02-20

## 2018-02-20 VITALS
HEIGHT: 65.5 IN | BODY MASS INDEX: 34.24 KG/M2 | DIASTOLIC BLOOD PRESSURE: 70 MMHG | TEMPERATURE: 98 F | WEIGHT: 208 LBS | SYSTOLIC BLOOD PRESSURE: 132 MMHG | HEART RATE: 80 BPM | RESPIRATION RATE: 16 BRPM

## 2018-02-20 DIAGNOSIS — G47.10 HYPERSOMNIA: ICD-10-CM

## 2018-02-20 DIAGNOSIS — H81.13 BENIGN PAROXYSMAL POSITIONAL VERTIGO DUE TO BILATERAL VESTIBULAR DISORDER: Primary | ICD-10-CM

## 2018-02-20 DIAGNOSIS — R53.82 CHRONIC FATIGUE: ICD-10-CM

## 2018-02-20 PROCEDURE — 99214 OFFICE O/P EST MOD 30 MIN: CPT | Performed by: INTERNAL MEDICINE

## 2018-02-20 RX ORDER — ZOLPIDEM TARTRATE 5 MG/1
5 TABLET ORAL NIGHTLY PRN
Qty: 90 TABLET | Refills: 1 | Status: CANCELLED | OUTPATIENT
Start: 2018-02-20

## 2018-02-20 RX ORDER — LOSARTAN POTASSIUM 100 MG/1
100 TABLET ORAL
Qty: 90 TABLET | Refills: 0 | Status: SHIPPED | OUTPATIENT
Start: 2018-02-20 | End: 2018-06-27

## 2018-02-20 RX ORDER — ONDANSETRON 4 MG/1
TABLET, ORALLY DISINTEGRATING ORAL
Qty: 12 TABLET | Refills: 0 | Status: CANCELLED | OUTPATIENT
Start: 2018-02-20

## 2018-02-20 RX ORDER — MECLIZINE HYDROCHLORIDE 25 MG/1
25 TABLET ORAL 3 TIMES DAILY PRN
Qty: 30 TABLET | Refills: 0 | Status: SHIPPED | OUTPATIENT
Start: 2018-02-20 | End: 2018-07-20 | Stop reason: ALTCHOICE

## 2018-02-20 NOTE — PATIENT INSTRUCTIONS
Problemas del oído interno: causas del mareo (vértigo)       Vértigo postural trena (BPV, por oneil siglas en inglés)  Esta es la causa más común de vértigo.  El BPV (denominado también BPPV, o “vértigo postural paroxístico trena”) se produce cuando los · Causar problemas variables de la audición, generalmente en un oído, que empeoran con Deer. · Causar zumbidos o pitidos en los oídos (acúfenos o tinitus). · Causar florecita sensación de llenura o presión en el oído.   · Causar que cualquiera de estos sín · En la clínica del sueño. La mayoría de los estudios del sueño se realizan en florecita clínica o laboratorio especial para rodney fin.  Rodney cordon en florecita habitación privada, parecida a la de un [facility]. Kayli Bowman de un familiar o amigo, p

## 2018-02-20 NOTE — PROGRESS NOTES
Patient presents with:  Dizziness: acute on chronic intermittent BPPV  Other: hypersomnia and chronic fatigue      HPI: The pt presents today for eval of 2 issues:  1. Dizziness - Acute on chronic intermittent BPPV.   She has noticed a flare up over the las Tab, Take 1 tablet (100 mg total) by mouth once daily. , Disp: 90 tablet, Rfl: 0  •  Meclizine HCl 25 MG Oral Tab, Take 1 tablet (25 mg total) by mouth 3 (three) times daily as needed for Dizziness. , Disp: 30 tablet, Rfl: 0  •  atorvastatin 40 MG Oral Tab, obese  HEENT - PERRL, EOMI, OP is clear; TMs clear B  Neck - supple, no JVD, no thyromegaly; 2+ carotids and no bruits  Lungs - CTAB  CV - RRR, nl s1, s2  Abd - soft, NABS, NT, ND  Ext - no c/c/e  Neuro - + Kimball-Hallpike w/ nystagmus B; CNs 2-12 grossly int losartan 100 MG Oral Tab 90 tablet 0      Sig: Take 1 tablet (100 mg total) by mouth once daily. Meclizine HCl 25 MG Oral Tab 30 tablet 0      Sig: Take 1 tablet (25 mg total) by mouth 3 (three) times daily as needed for Dizziness.            Imaging &

## 2018-02-21 ENCOUNTER — APPOINTMENT (OUTPATIENT)
Dept: LAB | Age: 63
End: 2018-02-21
Attending: INTERNAL MEDICINE
Payer: MEDICARE

## 2018-02-21 DIAGNOSIS — Z86.73 HISTORY OF ARTERIAL ISCHEMIC STROKE: ICD-10-CM

## 2018-02-21 DIAGNOSIS — E89.0 POSTOPERATIVE HYPOTHYROIDISM: ICD-10-CM

## 2018-02-21 DIAGNOSIS — M85.89 OSTEOPENIA OF MULTIPLE SITES: ICD-10-CM

## 2018-02-21 DIAGNOSIS — R53.82 CHRONIC FATIGUE: ICD-10-CM

## 2018-02-21 DIAGNOSIS — I10 ESSENTIAL HYPERTENSION: ICD-10-CM

## 2018-02-21 LAB
25-HYDROXYVITAMIN D (TOTAL): 18.9 NG/ML (ref 30–100)
ALBUMIN SERPL-MCNC: 3.5 G/DL (ref 3.5–4.8)
ALP LIVER SERPL-CCNC: 68 U/L (ref 50–130)
ALT SERPL-CCNC: 19 U/L (ref 14–54)
AST SERPL-CCNC: 11 U/L (ref 15–41)
BILIRUB SERPL-MCNC: 0.5 MG/DL (ref 0.1–2)
BUN BLD-MCNC: 13 MG/DL (ref 8–20)
CALCIUM BLD-MCNC: 8.7 MG/DL (ref 8.3–10.3)
CHLORIDE: 107 MMOL/L (ref 101–111)
CHOLEST SMN-MCNC: 160 MG/DL (ref ?–200)
CO2: 26 MMOL/L (ref 22–32)
CREAT BLD-MCNC: 0.63 MG/DL (ref 0.55–1.02)
FOLATE (FOLIC ACID), SERUM: 9.3 NG/ML (ref 8.7–24)
FREE T4: 1.1 NG/DL (ref 0.9–1.8)
GLUCOSE BLD-MCNC: 84 MG/DL (ref 70–99)
HAV AB SERPL IA-ACNC: 512 PG/ML (ref 193–986)
HDLC SERPL-MCNC: 59 MG/DL (ref 45–?)
HDLC SERPL: 2.71 {RATIO} (ref ?–4.44)
LDLC SERPL CALC-MCNC: 82 MG/DL (ref ?–130)
M PROTEIN MFR SERPL ELPH: 7.6 G/DL (ref 6.1–8.3)
NONHDLC SERPL-MCNC: 101 MG/DL (ref ?–130)
POTASSIUM SERPL-SCNC: 4.1 MMOL/L (ref 3.6–5.1)
SODIUM SERPL-SCNC: 141 MMOL/L (ref 136–144)
TRIGL SERPL-MCNC: 96 MG/DL (ref ?–150)
TSI SER-ACNC: 1.59 MIU/ML (ref 0.35–5.5)
VLDLC SERPL CALC-MCNC: 19 MG/DL (ref 5–40)

## 2018-02-21 PROCEDURE — 80061 LIPID PANEL: CPT

## 2018-02-21 PROCEDURE — 84439 ASSAY OF FREE THYROXINE: CPT

## 2018-02-21 PROCEDURE — 80053 COMPREHEN METABOLIC PANEL: CPT

## 2018-02-21 PROCEDURE — 82306 VITAMIN D 25 HYDROXY: CPT

## 2018-02-21 PROCEDURE — 82607 VITAMIN B-12: CPT

## 2018-02-21 PROCEDURE — 84443 ASSAY THYROID STIM HORMONE: CPT

## 2018-02-21 PROCEDURE — 36415 COLL VENOUS BLD VENIPUNCTURE: CPT

## 2018-02-21 PROCEDURE — 82746 ASSAY OF FOLIC ACID SERUM: CPT

## 2018-02-22 ENCOUNTER — MED REC SCAN ONLY (OUTPATIENT)
Dept: INTERNAL MEDICINE CLINIC | Facility: CLINIC | Age: 63
End: 2018-02-22

## 2018-02-26 ENCOUNTER — TELEPHONE (OUTPATIENT)
Dept: INTERNAL MEDICINE CLINIC | Facility: CLINIC | Age: 63
End: 2018-02-26

## 2018-02-26 DIAGNOSIS — E55.9 VITAMIN D DEFICIENCY: Primary | ICD-10-CM

## 2018-02-26 RX ORDER — ERGOCALCIFEROL 1.25 MG/1
50000 CAPSULE ORAL WEEKLY
Qty: 12 CAPSULE | Refills: 0 | Status: SHIPPED | OUTPATIENT
Start: 2018-02-26 | End: 2018-07-20 | Stop reason: ALTCHOICE

## 2018-02-26 NOTE — TELEPHONE ENCOUNTER
Call patient. Have her stop taking her CALCITRIOL for the next 3 months while she's on the prescription Vitamin D.  Prescription Vitamin D and Calcitriol are related.   I think she may actually do better on the once-weekly prescription Vitamin D versus the

## 2018-03-01 ENCOUNTER — OFFICE VISIT (OUTPATIENT)
Dept: PHYSICAL THERAPY | Age: 63
End: 2018-03-01
Attending: INTERNAL MEDICINE
Payer: MEDICARE

## 2018-03-01 DIAGNOSIS — H81.13 BENIGN PAROXYSMAL POSITIONAL VERTIGO DUE TO BILATERAL VESTIBULAR DISORDER: ICD-10-CM

## 2018-03-01 PROCEDURE — 97530 THERAPEUTIC ACTIVITIES: CPT | Performed by: PHYSICAL THERAPIST

## 2018-03-01 PROCEDURE — 97163 PT EVAL HIGH COMPLEX 45 MIN: CPT | Performed by: PHYSICAL THERAPIST

## 2018-03-01 NOTE — PROGRESS NOTES
VESTIBULAR EVALUATION:   Referring Physician: Dr. Jose Phelan  Date of Onset: 3 wks ago Date of Service: 3/1/2018   Diagnosis: BPPV          PATIENT SUMMARY   Mercy Burnett is a 58year old y/o female who presents to therapy today with reports of sudden onset acuity, upbeating nystagmus with position changes (although [employer] negative) and decreased balance. Pt has difficulty walking, moving head quickly, performing bed mobility and transfers and doing household chores due to these deficits.  Will continue FOTO score and clinical rationale, this evaluation involved High Complexity decision making due to 3+ personal factors/comorbidities, 3 body structures involved/activity limitations, and unstable symptoms including dizziness and imbalance.        PLAN OF CA treatment and while under my care.      X___________________________________________________ Date____________________     Certification From: 2/1/6510 To:5/30/2018

## 2018-03-06 ENCOUNTER — APPOINTMENT (OUTPATIENT)
Dept: PHYSICAL THERAPY | Age: 63
End: 2018-03-06
Attending: INTERNAL MEDICINE
Payer: MEDICARE

## 2018-03-08 ENCOUNTER — OFFICE VISIT (OUTPATIENT)
Dept: PHYSICAL THERAPY | Age: 63
End: 2018-03-08
Attending: INTERNAL MEDICINE
Payer: MEDICARE

## 2018-03-08 PROCEDURE — 95992 CANALITH REPOSITIONING PROC: CPT | Performed by: PHYSICAL THERAPIST

## 2018-03-08 PROCEDURE — 97112 NEUROMUSCULAR REEDUCATION: CPT | Performed by: PHYSICAL THERAPIST

## 2018-03-08 NOTE — PROGRESS NOTES
Dx: BPPV         Authorized # of Visits:  n/a         Next MD visit: none scheduled  Fall Risk: standard         Precautions: n/a             Subjective: Pt states she still feels dizzy. Home exercise causes dizziness per report.     Objective: Gera Reasons Pik

## 2018-03-09 RX ORDER — LOSARTAN POTASSIUM 100 MG/1
TABLET ORAL
Qty: 90 TABLET | Refills: 0 | OUTPATIENT
Start: 2018-03-09

## 2018-03-13 ENCOUNTER — TELEPHONE (OUTPATIENT)
Dept: INTERNAL MEDICINE CLINIC | Facility: CLINIC | Age: 63
End: 2018-03-13

## 2018-03-13 ENCOUNTER — OFFICE VISIT (OUTPATIENT)
Dept: PHYSICAL THERAPY | Age: 63
End: 2018-03-13
Attending: INTERNAL MEDICINE
Payer: MEDICARE

## 2018-03-13 PROCEDURE — 97112 NEUROMUSCULAR REEDUCATION: CPT | Performed by: PHYSICAL THERAPIST

## 2018-03-13 PROCEDURE — 95992 CANALITH REPOSITIONING PROC: CPT | Performed by: PHYSICAL THERAPIST

## 2018-03-13 NOTE — TELEPHONE ENCOUNTER
Pt called to see if we have the new shingles vaccine, was told not yet (did inform the nurse and we do have the new one but we have to finish the old one first before we can start using new vaccine)

## 2018-03-13 NOTE — PROGRESS NOTES
Dx: BPPV         Authorized # of Visits:  n/a         Next MD visit: none scheduled  Fall Risk: standard         Precautions: n/a             Subjective:Pt states she noticed dizziness after quick head movements last night lasting few seconds.     Objective 1 VOR cancellation vertical X 30        Feet tog EC on foam 30 sec X 3 Walking with head movements horizontal 20' X 4        Tandem stance EO 30 sec X 1 ea                      Charges: neuro re ed X 2, canalith repositioning X 1       Total Timed Treatmen

## 2018-03-15 ENCOUNTER — TELEPHONE (OUTPATIENT)
Dept: INTERNAL MEDICINE CLINIC | Facility: CLINIC | Age: 63
End: 2018-03-15

## 2018-03-15 ENCOUNTER — OFFICE VISIT (OUTPATIENT)
Dept: PHYSICAL THERAPY | Age: 63
End: 2018-03-15
Attending: INTERNAL MEDICINE
Payer: MEDICARE

## 2018-03-15 PROCEDURE — 95992 CANALITH REPOSITIONING PROC: CPT | Performed by: PHYSICAL THERAPIST

## 2018-03-15 NOTE — PROGRESS NOTES
Dx: BPPV         Authorized # of Visits:  n/a         Next MD visit: none scheduled  Fall Risk: standard         Precautions: n/a             Subjective: Pt states she was ok until last night.  However, pt states she noticed increased dizziness when turning VOR I vertical head movt X 5 sets VOR I vertical head movt X 5 sets        Feet tog EC 30 sec X 1 VOR cancellation horizontal X 30        Feet tog EO on foam 30 sec X 1 VOR cancellation vertical X 30        Feet tog EC on foam 30 sec X 3 Walking with hea

## 2018-03-15 NOTE — TELEPHONE ENCOUNTER
Patient can take her Meclizine which is indicated for dizziness but can also help w/ nausea. Lieutenant Saldivar. Devyn Lantigua MD  Diplomate, American Board of Internal Medicine  [employer] [facility]  [address] [facility],4Th Floor, [address], [facility], [address] [place] [address]  T: [id number];  Kristina Hook

## 2018-03-16 NOTE — TELEPHONE ENCOUNTER
Called pt and pt stated has tried Meclizine w/o improvement. Pt requested an ov w Dr Caleb Mejia. appointment given for 3/20/18.

## 2018-03-20 ENCOUNTER — OFFICE VISIT (OUTPATIENT)
Dept: INTERNAL MEDICINE CLINIC | Facility: CLINIC | Age: 63
End: 2018-03-20

## 2018-03-20 VITALS
RESPIRATION RATE: 12 BRPM | WEIGHT: 210.75 LBS | DIASTOLIC BLOOD PRESSURE: 98 MMHG | SYSTOLIC BLOOD PRESSURE: 140 MMHG | TEMPERATURE: 99 F | BODY MASS INDEX: 34.69 KG/M2 | HEART RATE: 78 BPM | HEIGHT: 65.5 IN

## 2018-03-20 DIAGNOSIS — H81.13 BENIGN PAROXYSMAL POSITIONAL VERTIGO DUE TO BILATERAL VESTIBULAR DISORDER: Primary | ICD-10-CM

## 2018-03-20 DIAGNOSIS — H93.13 TINNITUS OF BOTH EARS: ICD-10-CM

## 2018-03-20 DIAGNOSIS — E66.9 OBESITY, CLASS I, BMI 30-34.9: ICD-10-CM

## 2018-03-20 PROCEDURE — 99214 OFFICE O/P EST MOD 30 MIN: CPT | Performed by: INTERNAL MEDICINE

## 2018-03-20 NOTE — PROGRESS NOTES
Patient presents with: Other: multiple complaints      HPI: The pt presents today for eval of multiple medical concerns as follows:    1. BPPV - Acute on chronic presentation x 1 wk.   Has been feeling a bit dizzy as of late, but she attributes this to eat ALPRAZOLAM 0.25 MG Oral Tab, TAKE 1 TABLET BY MOUTH TWICE DAILY AS NEEDED FOR ANXIETY, Disp: 60 tablet, Rfl: 5  •  Esomeprazole Magnesium (NEXIUM) 40 MG Oral Capsule Delayed Release, Take 1 capsule (40 mg total) by mouth every morning before breakfast., [facility] given on support measures. 3. Obesity and elevated BMI - Send to [facility]. 4. RTC PRN or at next regularly scheduled OV. Patient verbally agrees to and understands the plan as outlined above.   Patient was also afforded the time and opportunity to ask jpio

## 2018-03-20 NOTE — PATIENT INSTRUCTIONS
Tinitus (zumbido en los oídos)     El tratamiento puede incluir enmascaradores y KRAMFORS. Tinitus es el nombre que se da a un zumbido en el oído que no es causado por un claudio exterior.  Sheila zumbido puede ser un silbido, chasquido, soplido o rugido · La terapia de reentrenamiento para el tinitus incluye asesoramiento y el uso de enmascaradores, los cuales pueden distraerlo del tinitus. Nadiya Vaz información  American Speech-Hearing-Language Association:   [phone], www.estrella. org  American Tinnitus

## 2018-04-03 RX ORDER — CALCITRIOL 0.5 UG/1
CAPSULE, LIQUID FILLED ORAL
Qty: 90 CAPSULE | Refills: 0 | OUTPATIENT
Start: 2018-04-03

## 2018-04-06 NOTE — TELEPHONE ENCOUNTER
Spoke to pt and states she is currently not taking medication. Informed rx was declined to pharmacy.

## 2018-05-19 DIAGNOSIS — E55.9 VITAMIN D DEFICIENCY: ICD-10-CM

## 2018-05-21 RX ORDER — ERGOCALCIFEROL 1.25 MG/1
CAPSULE ORAL
Qty: 12 CAPSULE | Refills: 0 | OUTPATIENT
Start: 2018-05-21

## 2018-05-21 NOTE — TELEPHONE ENCOUNTER
Refill requested: Vitamin D 83394    Failed protocol      Last refill: 2/26/18 #12NR  Relevant Labs: 2/21/18   Notes Recorded by Daniel Hooks MD on 2/22/2018 at 8:28 PM CST  Labs normal outside of low vitamin D.  Notify pt.  Please order and pend Vitamin D

## 2018-06-27 RX ORDER — LOSARTAN POTASSIUM 100 MG/1
TABLET ORAL
Qty: 90 TABLET | Refills: 0 | Status: SHIPPED | OUTPATIENT
Start: 2018-06-27 | End: 2018-09-27

## 2018-06-27 NOTE — TELEPHONE ENCOUNTER
Losartan 100 mg 1 tab daily filled 2-20-18 90 with 0 refills     LOV 3-20-18     RTC PRN or at next regularly scheduled OV.      Labs 2-21-18     Next apt 7-31-18

## 2018-07-02 ENCOUNTER — TELEPHONE (OUTPATIENT)
Dept: INTERNAL MEDICINE CLINIC | Facility: CLINIC | Age: 63
End: 2018-07-02

## 2018-07-02 NOTE — TELEPHONE ENCOUNTER
Spoke with pt her orthopedic Dr who did her knee replacement ordered her abx No longer needs this from our office

## 2018-07-02 NOTE — TELEPHONE ENCOUNTER
[address] called patient has upcoming appointment with a deep clean and possibly more work at that time; patient informed dentist she may need an antibiotic; pt requests Amoxicillin if possible.  Please call dentist office after discussing with darrick

## 2018-07-20 ENCOUNTER — OFFICE VISIT (OUTPATIENT)
Dept: INTERNAL MEDICINE CLINIC | Facility: CLINIC | Age: 63
End: 2018-07-20
Payer: MEDICARE

## 2018-07-20 VITALS
HEIGHT: 65 IN | WEIGHT: 207 LBS | DIASTOLIC BLOOD PRESSURE: 86 MMHG | BODY MASS INDEX: 34.49 KG/M2 | HEART RATE: 75 BPM | SYSTOLIC BLOOD PRESSURE: 124 MMHG | RESPIRATION RATE: 18 BRPM | TEMPERATURE: 98 F | OXYGEN SATURATION: 97 %

## 2018-07-20 DIAGNOSIS — K21.9 GASTROESOPHAGEAL REFLUX DISEASE WITHOUT ESOPHAGITIS: ICD-10-CM

## 2018-07-20 DIAGNOSIS — R19.7 DIARRHEA, UNSPECIFIED TYPE: Primary | ICD-10-CM

## 2018-07-20 PROCEDURE — 99214 OFFICE O/P EST MOD 30 MIN: CPT | Performed by: INTERNAL MEDICINE

## 2018-07-20 RX ORDER — ESOMEPRAZOLE MAGNESIUM 40 MG/1
40 CAPSULE, DELAYED RELEASE ORAL
Qty: 90 CAPSULE | Refills: 1 | Status: SHIPPED | OUTPATIENT
Start: 2018-07-20 | End: 2021-03-03

## 2018-07-20 RX ORDER — CIPROFLOXACIN 500 MG/1
500 TABLET, FILM COATED ORAL 2 TIMES DAILY
Qty: 10 TABLET | Refills: 0 | Status: SHIPPED | OUTPATIENT
Start: 2018-07-20 | End: 2018-07-25

## 2018-07-20 NOTE — PROGRESS NOTES
Mercy Burnett is a 61year old female. HPI:   Patient presents with:  Diarrhea: x 10 days. Noticed after eating a filet-o-fish from [address]. Vomiting  Nausea  Abdominal Pain  Patient presents with acute GI symptoms.   Diarrhea, nausea, abdominal federico past medical history of Anesthesia complication; Cancer ([facility] [address] Kidney stone; Normal delivery; Other screening mammogram (2/07/2012); Other screening mammogram (1/17/2011); Pain in joint, ankle and foot (9/14/2011);  Personal history of malignant neoplasm of Stool studies ordered as well. If stool studies normal and symptoms persist, will refer to GI for further evaluation. 2.  GERD without esophagitis  Stable. Esomeprazole refilled.       Start Time: 2:30 PM  End Time: 3:00 PM  More than 25 minutes were

## 2018-07-27 RX ORDER — LEVOTHYROXINE SODIUM 175 MCG
TABLET ORAL
Qty: 30 TABLET | Refills: 0 | Status: SHIPPED | OUTPATIENT
Start: 2018-07-27 | End: 2018-08-23

## 2018-07-27 NOTE — TELEPHONE ENCOUNTER
Synthroid 175 mcg 1 tab daily filled 10/18/16 30 with 0 refills     LOv 3/20/18     RTC PRN or at next regularly scheduled OV    Next apt 7/31/18    Labs 2/21/18    Free T4 0.9 - 1.8 ng/dL 1.1    TSH 0.350 - 5.500 mIU/mL 1.590

## 2018-07-31 ENCOUNTER — OFFICE VISIT (OUTPATIENT)
Dept: INTERNAL MEDICINE CLINIC | Facility: CLINIC | Age: 63
End: 2018-07-31
Payer: MEDICARE

## 2018-07-31 VITALS
RESPIRATION RATE: 12 BRPM | HEART RATE: 80 BPM | DIASTOLIC BLOOD PRESSURE: 80 MMHG | BODY MASS INDEX: 35 KG/M2 | OXYGEN SATURATION: 97 % | WEIGHT: 207.5 LBS | TEMPERATURE: 98 F | SYSTOLIC BLOOD PRESSURE: 110 MMHG

## 2018-07-31 DIAGNOSIS — E55.9 VITAMIN D DEFICIENCY: ICD-10-CM

## 2018-07-31 DIAGNOSIS — I10 ESSENTIAL HYPERTENSION: Primary | ICD-10-CM

## 2018-07-31 PROCEDURE — 99213 OFFICE O/P EST LOW 20 MIN: CPT | Performed by: INTERNAL MEDICINE

## 2018-07-31 NOTE — PROGRESS NOTES
Patient presents with: Follow - Up: Hypertension and Vitamin D      HPI: The pt presents today for f/u HTN and Vitamin D deficiency. Doing well. BPs are controlled. She is compliant w/ prescription medication.   Due for updated kidney/liver/cholesterol Smokeless tobacco: Never Used                      Alcohol use: Yes           0.0 oz/week     Comment: occasional      PE:  /80   Pulse 80   Temp 97.9 °F (36.6 °C) (Oral)   Resp 12   Wt 207 lb 8 oz   SpO2 97%   BMI 34.53 kg/m²   Ge

## 2018-08-01 ENCOUNTER — APPOINTMENT (OUTPATIENT)
Dept: LAB | Age: 63
End: 2018-08-01
Attending: INTERNAL MEDICINE
Payer: MEDICARE

## 2018-08-01 DIAGNOSIS — E55.9 VITAMIN D DEFICIENCY: ICD-10-CM

## 2018-08-01 DIAGNOSIS — I10 ESSENTIAL HYPERTENSION: ICD-10-CM

## 2018-08-01 LAB
ALBUMIN SERPL-MCNC: 3.5 G/DL (ref 3.5–4.8)
ALBUMIN/GLOB SERPL: 0.9 {RATIO} (ref 1–2)
ALP LIVER SERPL-CCNC: 62 U/L (ref 50–130)
ALT SERPL-CCNC: 47 U/L (ref 14–54)
ANION GAP SERPL CALC-SCNC: 6 MMOL/L (ref 0–18)
AST SERPL-CCNC: 23 U/L (ref 15–41)
BILIRUB SERPL-MCNC: 0.3 MG/DL (ref 0.1–2)
BUN BLD-MCNC: 16 MG/DL (ref 8–20)
BUN/CREAT SERPL: 25.4 (ref 10–20)
CALCIUM BLD-MCNC: 8.4 MG/DL (ref 8.3–10.3)
CHLORIDE SERPL-SCNC: 106 MMOL/L (ref 101–111)
CHOLEST SMN-MCNC: 157 MG/DL (ref ?–200)
CO2 SERPL-SCNC: 28 MMOL/L (ref 22–32)
CREAT BLD-MCNC: 0.63 MG/DL (ref 0.55–1.02)
GLOBULIN PLAS-MCNC: 3.8 G/DL (ref 2.5–3.7)
GLUCOSE BLD-MCNC: 86 MG/DL (ref 70–99)
HDLC SERPL-MCNC: 45 MG/DL (ref 40–59)
LDLC SERPL CALC-MCNC: 93 MG/DL (ref ?–100)
M PROTEIN MFR SERPL ELPH: 7.3 G/DL (ref 6.1–8.3)
NONHDLC SERPL-MCNC: 112 MG/DL (ref ?–130)
OSMOLALITY SERPL CALC.SUM OF ELEC: 290 MOSM/KG (ref 275–295)
POTASSIUM SERPL-SCNC: 4.3 MMOL/L (ref 3.6–5.1)
SODIUM SERPL-SCNC: 140 MMOL/L (ref 136–144)
TRIGL SERPL-MCNC: 97 MG/DL (ref 30–149)
VIT D+METAB SERPL-MCNC: 18.1 NG/ML (ref 30–100)
VLDLC SERPL CALC-MCNC: 19 MG/DL (ref 0–30)

## 2018-08-01 PROCEDURE — 80053 COMPREHEN METABOLIC PANEL: CPT

## 2018-08-01 PROCEDURE — 82306 VITAMIN D 25 HYDROXY: CPT

## 2018-08-01 PROCEDURE — 36415 COLL VENOUS BLD VENIPUNCTURE: CPT

## 2018-08-01 PROCEDURE — 80061 LIPID PANEL: CPT

## 2018-08-07 DIAGNOSIS — E55.9 VITAMIN D DEFICIENCY: Primary | ICD-10-CM

## 2018-08-07 RX ORDER — ERGOCALCIFEROL 1.25 MG/1
50000 CAPSULE ORAL WEEKLY
Qty: 12 CAPSULE | Refills: 0 | Status: SHIPPED | OUTPATIENT
Start: 2018-08-07 | End: 2018-11-05

## 2018-08-24 RX ORDER — LEVOTHYROXINE SODIUM 175 MCG
175 TABLET ORAL DAILY
Qty: 90 TABLET | Refills: 0 | Status: SHIPPED | OUTPATIENT
Start: 2018-08-24 | End: 2018-11-24

## 2018-08-24 NOTE — TELEPHONE ENCOUNTER
Refill requested: Synthroid 175 mcg     Passed protocol    Last refill: 7/27/18 #30 NR     Relevant Labs: 2/21/18 TSH     Last OV / RTC advised: 7/31/18 MG RTC 6 months     Appt Scheduled: No   Your appointments     Date & Time Appointment Department (Cent

## 2018-09-27 RX ORDER — LOSARTAN POTASSIUM 100 MG/1
100 TABLET ORAL DAILY
Qty: 90 TABLET | Refills: 0 | Status: SHIPPED | OUTPATIENT
Start: 2018-09-27 | End: 2018-12-28

## 2018-09-27 NOTE — TELEPHONE ENCOUNTER
Refill requested:   Requested Prescriptions     Pending Prescriptions Disp Refills   • losartan 100 MG Oral Tab [Pharmacy Med Name: LOSARTAN 100MG TABLETS] 90 tablet 0     Sig: Take 1 tablet (100 mg total) by mouth daily.      Passed protocol    Last refill

## 2018-10-29 RX ORDER — ERGOCALCIFEROL 1.25 MG/1
50000 CAPSULE ORAL WEEKLY
Qty: 12 CAPSULE | Refills: 0 | OUTPATIENT
Start: 2018-10-29

## 2018-10-29 NOTE — TELEPHONE ENCOUNTER
Refill requested:   Requested Prescriptions     Pending Prescriptions Disp Refills   • ergocalciferol 22372 units Oral Cap [Pharmacy Med Name: VITAMIN D2 50,000IU (ERGO) CAP RX] 12 capsule 0     Sig: Take 1 capsule (50,000 Units total) by mouth once a week

## 2018-10-30 ENCOUNTER — OFFICE VISIT (OUTPATIENT)
Dept: INTERNAL MEDICINE CLINIC | Facility: CLINIC | Age: 63
End: 2018-10-30
Payer: MEDICARE

## 2018-10-30 VITALS
HEART RATE: 76 BPM | BODY MASS INDEX: 34.91 KG/M2 | RESPIRATION RATE: 14 BRPM | WEIGHT: 209.5 LBS | TEMPERATURE: 98 F | DIASTOLIC BLOOD PRESSURE: 72 MMHG | HEIGHT: 65 IN | SYSTOLIC BLOOD PRESSURE: 126 MMHG

## 2018-10-30 DIAGNOSIS — Z23 FLU VACCINE NEED: ICD-10-CM

## 2018-10-30 DIAGNOSIS — E66.9 OBESITY, CLASS I, BMI 30-34.9: ICD-10-CM

## 2018-10-30 DIAGNOSIS — L30.9 DERMATITIS: ICD-10-CM

## 2018-10-30 DIAGNOSIS — R60.9 PERIPHERAL EDEMA: Primary | ICD-10-CM

## 2018-10-30 DIAGNOSIS — M72.2 PLANTAR FASCIITIS, BILATERAL: ICD-10-CM

## 2018-10-30 PROCEDURE — G0008 ADMIN INFLUENZA VIRUS VAC: HCPCS | Performed by: INTERNAL MEDICINE

## 2018-10-30 PROCEDURE — 90686 IIV4 VACC NO PRSV 0.5 ML IM: CPT | Performed by: INTERNAL MEDICINE

## 2018-10-30 PROCEDURE — 99214 OFFICE O/P EST MOD 30 MIN: CPT | Performed by: INTERNAL MEDICINE

## 2018-10-30 RX ORDER — FUROSEMIDE 20 MG/1
TABLET ORAL
Qty: 90 TABLET | Refills: 0 | Status: SHIPPED | OUTPATIENT
Start: 2018-10-30 | End: 2019-01-25

## 2018-10-30 NOTE — PATIENT INSTRUCTIONS
¿Qué es la fascitis plantar? La fascitis plantar es un afección que causa dolor en el talón y el pie. La fascia plantar es florecita coleman de tejido resistente que atraviesa la parte inferior del pie desde el talón a los dedos del pie.  Sheila tejido [place] del · Chaka medicamentos para el dolor. Los medicamentos analgésicos de venta con y sin receta pueden ayudar a aliviarle el dolor y la inflamación. · Usar protectores del talón o plantillas para el pie (ortesis).  Se colocan dentro de los zapatos para breanna mejo © 5276-0899 [address] [facility] [place] [address] [facility]. Todos los derechos reservados. Esta información no pretende sustituir la atención médica profesional. Sólo bowen médico puede diagnosticar y tratar un problema de marshal.

## 2018-10-30 NOTE — PROGRESS NOTES
Patient presents with: Other: multiple medical concerns      HPI: Noman Laws presents today for eval of multiple medical concerns:    1. Peripheral edema - Ongoing issue, but moreso intermittent.   In the past, she took Furosemide and last script was writte disorder     Vitamin D deficiency    Patient has no known allergies. Medications:  Reviewed and per the scanned EHR    Social History    Tobacco Use      Smoking status: Never Smoker      Smokeless tobacco: Never Used    Alcohol use:  Yes      [facility]  [address] [facility],[address] Floor, [address], [facility], [address]  T: [id number]; F: [phone]     Orders Placed This Encounter      Flulaval 6 months and older 0.5 ml Quad PF [[id number]]      Meds & Refills for this Visit:  Requested Prescriptions     S

## 2018-11-13 ENCOUNTER — TELEPHONE (OUTPATIENT)
Dept: INTERNAL MEDICINE CLINIC | Facility: CLINIC | Age: 63
End: 2018-11-13

## 2018-11-13 NOTE — TELEPHONE ENCOUNTER
Pt called to advise that med losartan 100 MG Oral Tab was recalled. Please call to discuss a new med.

## 2018-11-19 ENCOUNTER — TELEPHONE (OUTPATIENT)
Dept: INTERNAL MEDICINE CLINIC | Facility: CLINIC | Age: 63
End: 2018-11-19

## 2018-11-24 RX ORDER — LEVOTHYROXINE SODIUM 175 MCG
175 TABLET ORAL DAILY
Qty: 90 TABLET | Refills: 0 | Status: SHIPPED | OUTPATIENT
Start: 2018-11-24 | End: 2019-03-18

## 2018-12-28 RX ORDER — LOSARTAN POTASSIUM 100 MG/1
TABLET ORAL
Qty: 90 TABLET | Refills: 0 | Status: SHIPPED | OUTPATIENT
Start: 2018-12-28 | End: 2019-03-27

## 2018-12-28 NOTE — TELEPHONE ENCOUNTER
Refill requested:   Requested Prescriptions     Pending Prescriptions Disp Refills   • LOSARTAN 100 MG Oral Tab [Pharmacy Med Name: LOSARTAN 100MG TABLETS] 90 tablet 0     Sig: TAKE 1 TABLET(100 MG) BY MOUTH DAILY     Passed protocol    Last refill: 9/27/1

## 2019-01-01 NOTE — PATIENT INSTRUCTIONS
You had normal labs and a normal EKG. You are ok to proceed with surgery. We will fax results to appropriate places. It was a pleasure seeing you in the clinic today.   Thank you for choosing the [place] office for your healthc
moving all extremities against gravity

## 2019-01-25 RX ORDER — FUROSEMIDE 20 MG/1
TABLET ORAL
Qty: 90 TABLET | Refills: 0 | Status: SHIPPED | OUTPATIENT
Start: 2019-01-25 | End: 2019-08-15

## 2019-01-25 NOTE — TELEPHONE ENCOUNTER
Passed Protocol    Medication(s) to Refill:   Requested Prescriptions     Pending Prescriptions Disp Refills   • FUROSEMIDE 20 MG Oral Tab [Pharmacy Med Name: FUROSEMIDE 20MG TABLETS] 90 tablet 0     Sig: TAKE 1 TABLET BY MOUTH EVERY DAY AS NEEDED FOR LEG

## 2019-02-06 ENCOUNTER — OFFICE VISIT (OUTPATIENT)
Dept: INTERNAL MEDICINE CLINIC | Facility: CLINIC | Age: 64
End: 2019-02-06
Payer: MEDICARE

## 2019-02-06 DIAGNOSIS — I10 ESSENTIAL HYPERTENSION: ICD-10-CM

## 2019-02-06 DIAGNOSIS — Z86.010 HISTORY OF COLONIC POLYPS: ICD-10-CM

## 2019-02-06 DIAGNOSIS — Z12.39 ENCOUNTER FOR SCREENING FOR MALIGNANT NEOPLASM OF BREAST: ICD-10-CM

## 2019-02-06 DIAGNOSIS — M25.561 ACUTE PAIN OF RIGHT KNEE: ICD-10-CM

## 2019-02-06 DIAGNOSIS — M25.511 ACUTE PAIN OF RIGHT SHOULDER: ICD-10-CM

## 2019-02-06 DIAGNOSIS — E89.0 POSTOPERATIVE HYPOTHYROIDISM: ICD-10-CM

## 2019-02-06 DIAGNOSIS — J01.90 ACUTE RHINOSINUSITIS: ICD-10-CM

## 2019-02-06 DIAGNOSIS — Z12.11 SCREENING FOR COLON CANCER: ICD-10-CM

## 2019-02-06 DIAGNOSIS — Z86.73 HISTORY OF ARTERIAL ISCHEMIC STROKE: ICD-10-CM

## 2019-02-06 DIAGNOSIS — E55.9 VITAMIN D DEFICIENCY: ICD-10-CM

## 2019-02-06 DIAGNOSIS — M85.89 OSTEOPENIA OF MULTIPLE SITES: ICD-10-CM

## 2019-02-06 DIAGNOSIS — Z00.00 MEDICARE ANNUAL WELLNESS VISIT, SUBSEQUENT: Primary | ICD-10-CM

## 2019-02-06 DIAGNOSIS — F51.04 CHRONIC INSOMNIA: ICD-10-CM

## 2019-02-06 DIAGNOSIS — K21.9 GASTROESOPHAGEAL REFLUX DISEASE WITHOUT ESOPHAGITIS: ICD-10-CM

## 2019-02-06 DIAGNOSIS — F41.1 GAD (GENERALIZED ANXIETY DISORDER): ICD-10-CM

## 2019-02-06 PROCEDURE — G0439 PPPS, SUBSEQ VISIT: HCPCS | Performed by: INTERNAL MEDICINE

## 2019-02-06 PROCEDURE — 99214 OFFICE O/P EST MOD 30 MIN: CPT | Performed by: INTERNAL MEDICINE

## 2019-02-06 RX ORDER — CODEINE PHOSPHATE AND GUAIFENESIN 10; 100 MG/5ML; MG/5ML
5 SOLUTION ORAL NIGHTLY PRN
Qty: 180 ML | Refills: 0 | Status: SHIPPED | OUTPATIENT
Start: 2019-02-06 | End: 2019-03-18 | Stop reason: ALTCHOICE

## 2019-02-06 RX ORDER — METHYLPREDNISOLONE 4 MG/1
TABLET ORAL
Qty: 1 KIT | Refills: 0 | Status: SHIPPED | OUTPATIENT
Start: 2019-02-06 | End: 2019-03-18

## 2019-02-06 RX ORDER — LEVOTHYROXINE SODIUM 200 MCG
TABLET ORAL
Refills: 3 | COMMUNITY
Start: 2019-01-31 | End: 2019-08-22 | Stop reason: DRUGHIGH

## 2019-02-06 RX ORDER — CALCITRIOL 0.5 UG/1
0.5 CAPSULE, LIQUID FILLED ORAL DAILY
Qty: 90 CAPSULE | Refills: 1 | Status: SHIPPED | OUTPATIENT
Start: 2019-02-06 | End: 2019-07-18

## 2019-02-06 RX ORDER — CALCITRIOL 0.5 UG/1
CAPSULE, LIQUID FILLED ORAL
COMMUNITY
End: 2019-02-06

## 2019-02-06 RX ORDER — AMOXICILLIN AND CLAVULANATE POTASSIUM 875; 125 MG/1; MG/1
1 TABLET, FILM COATED ORAL 2 TIMES DAILY
Qty: 14 TABLET | Refills: 0 | Status: SHIPPED | OUTPATIENT
Start: 2019-02-06 | End: 2019-02-13

## 2019-02-06 NOTE — PROGRESS NOTES
HPI:   Chapin Fernando is a 61year old female who presents for a Medicare Subsequent Annual Wellness visit (Pt already had Initial Annual Wellness) and follow up on medical issues.   Her last annual assessment has been over 1 year: Annual Physical due on 1 of functional status. She has Vision problems based on screening of functional status. Vision Problems? : Yes   She has Walking problems based on screening of functional status.    Difficulty walking?: Yes             Depression S stroke - chronic in the lateral R basal ganglia via MRI 11/2017     Benign paroxysmal positional vertigo due to bilateral vestibular disorder     Vitamin D deficiency    Wt Readings from Last 3 Encounters:  02/06/19 : 206 lb 8 oz  10/30/18 : 209 lb 8 oz  0 (10 mg total) by mouth 3 (three) times daily as needed for Muscle spasms.       MEDICAL INFORMATION:   She  has a past medical history of Anesthesia complication, Cancer ([facility] [address] Kidney stone, Normal delivery, Other screening mammogram (2/07/2012), Other scre Assessment  (Required for AWV/SWV)    Questionnaire       Visual Acuity                         GENERAL: Alert and oriented, well developed, well nourished,in no apparent distress  HEENT: atraumatic, PERRLA, EOMI, normal lid and conjunctiva, bilateral sinu prescribed. Guaifenesin-codeine syrup prescribed. Advised patient to take at night only due to drowsiness. -     Amoxicillin-Pot Clavulanate 875-125 MG Oral Tab; Take 1 tablet by mouth 2 (two) times daily for 7 days.   -     methylPREDNISolone (MEDROL) 4 describe your current health state?: Fair  How do you maintain positive mental well-being?: Visiting Family      This section provided for quick review of chart, separate sheet to patient  [address] Internal Lab or Pro Influenza  Covered Annually 10/30/2018 Please get every year    Pneumococcal 13 (Prevnar)  Covered Once after 65 No vaccine history found Please get once after your 65th birthday    Pneumococcal 23 (Pneumovax)  Covered Once after 65 No vaccine history foun

## 2019-02-06 NOTE — PATIENT INSTRUCTIONS
For cough:  - Start antibiotics (Augmentin). Take 1 capsule twice daily with food x 1 week. - Use cough syrup at night as needed. Do not drive after taking this medication.       For knee and shoulder pain:  - We will do a course of steroids (methylpredn

## 2019-02-08 ENCOUNTER — HOSPITAL ENCOUNTER (OUTPATIENT)
Dept: MAMMOGRAPHY | Age: 64
Discharge: HOME | End: 2019-02-08
Attending: INTERNAL MEDICINE
Payer: MEDICARE

## 2019-02-08 VITALS
HEART RATE: 70 BPM | HEIGHT: 65.5 IN | BODY MASS INDEX: 33.99 KG/M2 | TEMPERATURE: 98 F | RESPIRATION RATE: 16 BRPM | DIASTOLIC BLOOD PRESSURE: 88 MMHG | WEIGHT: 206.5 LBS | SYSTOLIC BLOOD PRESSURE: 130 MMHG

## 2019-02-08 DIAGNOSIS — Z12.39 ENCOUNTER FOR SCREENING FOR MALIGNANT NEOPLASM OF BREAST: ICD-10-CM

## 2019-02-08 PROCEDURE — 77063 BREAST TOMOSYNTHESIS BI: CPT | Performed by: INTERNAL MEDICINE

## 2019-02-08 PROCEDURE — 77067 SCR MAMMO BI INCL CAD: CPT | Performed by: INTERNAL MEDICINE

## 2019-02-18 ENCOUNTER — TELEPHONE (OUTPATIENT)
Dept: INTERNAL MEDICINE CLINIC | Facility: CLINIC | Age: 64
End: 2019-02-18

## 2019-02-18 NOTE — TELEPHONE ENCOUNTER
Spoke with patient she states she needed the form filled out because she had 2 drivers license. Not because of any medical needs that would require medical clearance. Form placed in the blue folder.

## 2019-03-18 ENCOUNTER — OFFICE VISIT (OUTPATIENT)
Dept: INTERNAL MEDICINE CLINIC | Facility: CLINIC | Age: 64
End: 2019-03-18
Payer: MEDICARE

## 2019-03-18 VITALS
RESPIRATION RATE: 16 BRPM | BODY MASS INDEX: 34.4 KG/M2 | HEIGHT: 65.5 IN | TEMPERATURE: 97 F | SYSTOLIC BLOOD PRESSURE: 136 MMHG | WEIGHT: 209 LBS | HEART RATE: 80 BPM | OXYGEN SATURATION: 97 % | DIASTOLIC BLOOD PRESSURE: 84 MMHG

## 2019-03-18 DIAGNOSIS — E66.9 OBESITY, CLASS I, BMI 30-34.9: ICD-10-CM

## 2019-03-18 DIAGNOSIS — E89.0 POSTOPERATIVE HYPOTHYROIDISM: ICD-10-CM

## 2019-03-18 DIAGNOSIS — Z01.818 PREOP EXAMINATION: Primary | ICD-10-CM

## 2019-03-18 DIAGNOSIS — I10 ESSENTIAL HYPERTENSION: ICD-10-CM

## 2019-03-18 DIAGNOSIS — R60.9 PERIPHERAL EDEMA: ICD-10-CM

## 2019-03-18 DIAGNOSIS — F41.1 GAD (GENERALIZED ANXIETY DISORDER): ICD-10-CM

## 2019-03-18 DIAGNOSIS — Z86.73 HISTORY OF ARTERIAL ISCHEMIC STROKE: ICD-10-CM

## 2019-03-18 DIAGNOSIS — K21.9 GASTROESOPHAGEAL REFLUX DISEASE WITHOUT ESOPHAGITIS: ICD-10-CM

## 2019-03-18 PROCEDURE — 99214 OFFICE O/P EST MOD 30 MIN: CPT | Performed by: PHYSICIAN ASSISTANT

## 2019-03-18 NOTE — PROGRESS NOTES
Hossein Patel is a 61year old female who presents for a Preop physical exam.   HPI:   Pt presents for preop H&P.   Request for medical clearance is made by Dr. Corey Guevara for perioperative risk assessment due to patient's multiple medical issues as outlined b history of malignant neoplasm of thyroid 11/17/2011   • Personal history of urinary (tract) infection 7/21/2011   • PONV (postoperative nausea and vomiting)    • Screening for malignant neoplasm of the cervix       Past Surgical History:   Procedure Kristy Montenegro thyromegaly  LUNGS: clear to auscultation  CARDIO: RRR without murmur  GI: soft, non-tender, non-distended, BS present  MUSCULOSKELETAL: no spinal tenderness, no CVA tenderness  EXTREMITIES: no cyanosis, clubbing, or edema  NEURO: cranial nerves are intact

## 2019-03-18 NOTE — PATIENT INSTRUCTIONS
Stop all your OVER THE COUNTER supplements (ok to resume after surgery). Do not take anti-inflammatories (advil, motrin, ibuprofen, aleve, naproxen, aspirin) between now and surgery. OK to take Tylenol (acetaminophen) if needed.

## 2019-03-21 ENCOUNTER — TELEPHONE (OUTPATIENT)
Dept: INTERNAL MEDICINE CLINIC | Facility: CLINIC | Age: 64
End: 2019-03-21

## 2019-03-21 NOTE — TELEPHONE ENCOUNTER
[facility] same day surgery, calling in, following up on the fax that was sent to be co-signed, by a Medical Doctor.      Title of form: Medical Clearance    Pt has an upcoming surgery appointment tomorrow     Person to contact: Mario De Anda: [phone]

## 2019-03-21 NOTE — TELEPHONE ENCOUNTER
[facility] needed pre-op clearance completed by ARELI Maldonado co-signed by MD.     Dr Tisha Marion co-signed and faxed back to fx# provided.

## 2019-03-27 RX ORDER — LOSARTAN POTASSIUM 100 MG/1
TABLET ORAL
Qty: 90 TABLET | Refills: 0 | Status: SHIPPED | OUTPATIENT
Start: 2019-03-27 | End: 2019-06-28

## 2019-03-27 NOTE — TELEPHONE ENCOUNTER
Refill requested:   Requested Prescriptions     Pending Prescriptions Disp Refills   • LOSARTAN 100 MG Oral Tab [Pharmacy Med Name: LOSARTAN 100MG TABLETS] 90 tablet 0     Sig: TAKE 1 TABLET(100 MG) BY MOUTH DAILY     Passed protocol    Last refill:   12/2

## 2019-06-28 RX ORDER — LOSARTAN POTASSIUM 100 MG/1
TABLET ORAL
Qty: 90 TABLET | Refills: 0 | Status: SHIPPED | OUTPATIENT
Start: 2019-06-28 | End: 2019-09-27

## 2019-06-28 NOTE — TELEPHONE ENCOUNTER
Losartan 100 mg 1 tab daily filled 3-27-19 90 with 0 refills     LOV 3-18-19   return here in 3-6 months  No upcoming apt on file   Labs 8-1-18

## 2019-07-18 RX ORDER — CALCITRIOL 0.5 UG/1
CAPSULE, LIQUID FILLED ORAL
Qty: 90 CAPSULE | Refills: 1 | Status: SHIPPED | OUTPATIENT
Start: 2019-07-18 | End: 2020-01-21

## 2019-07-18 NOTE — TELEPHONE ENCOUNTER
Calcitriol 0.5 MCG Oral cap    Last OV relevant to medication: 02/06/2019    Last refill date: 02/06/2019     #/refills: #90 w/ 1 refill    When pt was asked to return for OV: 6 months    Upcoming appt/reason: No future appointments            Per last OV

## 2019-08-09 ENCOUNTER — TELEPHONE (OUTPATIENT)
Dept: INTERNAL MEDICINE CLINIC | Facility: CLINIC | Age: 64
End: 2019-08-09

## 2019-08-09 NOTE — TELEPHONE ENCOUNTER
Patient stated that she is having leg pain. She would like to get in to see Dr. Lois Jolly. No available appointments. Patient only wants to see Mendy Morrison. Please advise.

## 2019-08-09 NOTE — TELEPHONE ENCOUNTER
Pt c/o R sharp pain to thigh that radiates down leg to foot, causing difficulty ambulating x 2 weeks. Denies injury, swelling, redness, bruising, warmth to the touch, numbness or tingling, SOB, chest pain, recent travel.      Future Appointments   Date

## 2019-08-15 ENCOUNTER — OFFICE VISIT (OUTPATIENT)
Dept: INTERNAL MEDICINE CLINIC | Facility: CLINIC | Age: 64
End: 2019-08-15
Payer: MEDICARE

## 2019-08-15 VITALS
BODY MASS INDEX: 32.32 KG/M2 | HEIGHT: 65 IN | OXYGEN SATURATION: 99 % | RESPIRATION RATE: 16 BRPM | HEART RATE: 88 BPM | WEIGHT: 194 LBS | TEMPERATURE: 98 F | DIASTOLIC BLOOD PRESSURE: 74 MMHG | SYSTOLIC BLOOD PRESSURE: 134 MMHG

## 2019-08-15 DIAGNOSIS — I49.9 IRREGULAR HEART RHYTHM: Primary | ICD-10-CM

## 2019-08-15 DIAGNOSIS — E89.0 POSTOPERATIVE HYPOTHYROIDISM: ICD-10-CM

## 2019-08-15 DIAGNOSIS — G89.29 CHRONIC PAIN OF RIGHT KNEE: ICD-10-CM

## 2019-08-15 DIAGNOSIS — I10 ESSENTIAL HYPERTENSION: ICD-10-CM

## 2019-08-15 DIAGNOSIS — M25.561 CHRONIC PAIN OF RIGHT KNEE: ICD-10-CM

## 2019-08-15 PROCEDURE — 99214 OFFICE O/P EST MOD 30 MIN: CPT | Performed by: INTERNAL MEDICINE

## 2019-08-15 RX ORDER — FUROSEMIDE 20 MG/1
TABLET ORAL
Qty: 90 TABLET | Refills: 1 | Status: SHIPPED | OUTPATIENT
Start: 2019-08-15 | End: 2020-03-02

## 2019-08-15 RX ORDER — ATORVASTATIN CALCIUM 40 MG/1
40 TABLET, FILM COATED ORAL NIGHTLY
Qty: 90 TABLET | Refills: 1 | Status: SHIPPED | OUTPATIENT
Start: 2019-08-15 | End: 2020-03-02

## 2019-08-15 RX ORDER — ATORVASTATIN CALCIUM 40 MG/1
40 TABLET, FILM COATED ORAL NIGHTLY
COMMUNITY
End: 2019-08-15

## 2019-08-15 RX ORDER — INDOMETHACIN 50 MG/1
50 CAPSULE ORAL 2 TIMES DAILY PRN
Qty: 180 CAPSULE | Refills: 1 | Status: SHIPPED | OUTPATIENT
Start: 2019-08-15 | End: 2019-09-19

## 2019-08-15 RX ORDER — LIDOCAINE HYDROCHLORIDE 20 MG/ML
1 SOLUTION ORAL; TOPICAL DAILY
Refills: 0 | COMMUNITY
Start: 2019-03-25 | End: 2019-09-19

## 2019-08-15 NOTE — PROGRESS NOTES
Patient presents with: Other: iregular heart rhythm on wearable device; HTN; chronic R knee pain; HypoTSH       HPI: Neyda Burr presents for eval of multiple medical concerns as follows:    1.  Irregular heart rhythm - Detected on at least 1 occasion over t lb 8 oz  10/30/18 : 209 lb 8 oz  07/31/18 : 207 lb 8 oz  07/20/18 : 207 lb  Gen - A&Ox3, NAD, obese  HEENT - PERRL, EOMI, OP is clear  Neck - supple, no JVD, no thyromegaly  Lungs - CTAB  CV - RRR, nl s1, s2  Abd - soft, NABS, NT, ND  Ext - no c/c/e; R kne AND STRESS(CPT=93350/12335 DM 23634)  CARD MONITOR HOLTER 24 HOUR (CPT=93225)

## 2019-08-19 ENCOUNTER — HOSPITAL ENCOUNTER (OUTPATIENT)
Dept: GENERAL RADIOLOGY | Age: 64
Discharge: HOME | End: 2019-08-19
Attending: INTERNAL MEDICINE
Payer: MEDICARE

## 2019-08-19 ENCOUNTER — LAB ENCOUNTER (OUTPATIENT)
Dept: LAB | Age: 64
End: 2019-08-19
Attending: INTERNAL MEDICINE
Payer: MEDICARE

## 2019-08-19 DIAGNOSIS — I10 ESSENTIAL HYPERTENSION: ICD-10-CM

## 2019-08-19 DIAGNOSIS — E89.0 POSTOPERATIVE HYPOTHYROIDISM: ICD-10-CM

## 2019-08-19 DIAGNOSIS — M25.561 CHRONIC PAIN OF RIGHT KNEE: ICD-10-CM

## 2019-08-19 DIAGNOSIS — G89.29 CHRONIC PAIN OF RIGHT KNEE: ICD-10-CM

## 2019-08-19 LAB
ALBUMIN SERPL-MCNC: 3.7 G/DL (ref 3.4–5)
ALBUMIN/GLOB SERPL: 1 {RATIO} (ref 1–2)
ALP LIVER SERPL-CCNC: 72 U/L (ref 50–130)
ALT SERPL-CCNC: 31 U/L (ref 13–56)
ANION GAP SERPL CALC-SCNC: 4 MMOL/L (ref 0–18)
AST SERPL-CCNC: 16 U/L (ref 15–37)
BASOPHILS # BLD AUTO: 0 X10(3) UL (ref 0–0.2)
BASOPHILS NFR BLD AUTO: 0 %
BILIRUB SERPL-MCNC: 0.6 MG/DL (ref 0.1–2)
BUN BLD-MCNC: 11 MG/DL (ref 7–18)
BUN/CREAT SERPL: 17.5 (ref 10–20)
CALCIUM BLD-MCNC: 8.5 MG/DL (ref 8.5–10.1)
CHLORIDE SERPL-SCNC: 109 MMOL/L (ref 98–112)
CHOLEST SMN-MCNC: 172 MG/DL (ref ?–200)
CO2 SERPL-SCNC: 28 MMOL/L (ref 21–32)
CREAT BLD-MCNC: 0.63 MG/DL (ref 0.55–1.02)
DEPRECATED RDW RBC AUTO: 47.8 FL (ref 35.1–46.3)
EOSINOPHIL # BLD AUTO: 0.13 X10(3) UL (ref 0–0.7)
EOSINOPHIL NFR BLD AUTO: 2 %
ERYTHROCYTE [DISTWIDTH] IN BLOOD BY AUTOMATED COUNT: 15.3 % (ref 11–15)
GLOBULIN PLAS-MCNC: 3.8 G/DL (ref 2.8–4.4)
GLUCOSE BLD-MCNC: 91 MG/DL (ref 70–99)
HCT VFR BLD AUTO: 40.2 % (ref 35–48)
HDLC SERPL-MCNC: 62 MG/DL (ref 40–59)
HGB BLD-MCNC: 12.7 G/DL (ref 12–16)
IMM GRANULOCYTES # BLD AUTO: 0.01 X10(3) UL (ref 0–1)
IMM GRANULOCYTES NFR BLD: 0.2 %
LDLC SERPL CALC-MCNC: 95 MG/DL (ref ?–100)
LYMPHOCYTES # BLD AUTO: 1.97 X10(3) UL (ref 1–4)
LYMPHOCYTES NFR BLD AUTO: 29.9 %
M PROTEIN MFR SERPL ELPH: 7.5 G/DL (ref 6.4–8.2)
MCH RBC QN AUTO: 27.3 PG (ref 26–34)
MCHC RBC AUTO-ENTMCNC: 31.6 G/DL (ref 31–37)
MCV RBC AUTO: 86.3 FL (ref 80–100)
MONOCYTES # BLD AUTO: 0.5 X10(3) UL (ref 0.1–1)
MONOCYTES NFR BLD AUTO: 7.6 %
NEUTROPHILS # BLD AUTO: 3.97 X10 (3) UL (ref 1.5–7.7)
NEUTROPHILS # BLD AUTO: 3.97 X10(3) UL (ref 1.5–7.7)
NEUTROPHILS NFR BLD AUTO: 60.3 %
NONHDLC SERPL-MCNC: 110 MG/DL (ref ?–130)
OSMOLALITY SERPL CALC.SUM OF ELEC: 291 MOSM/KG (ref 275–295)
PLATELET # BLD AUTO: 305 10(3)UL (ref 150–450)
POTASSIUM SERPL-SCNC: 4 MMOL/L (ref 3.5–5.1)
RBC # BLD AUTO: 4.66 X10(6)UL (ref 3.8–5.3)
SODIUM SERPL-SCNC: 141 MMOL/L (ref 136–145)
T4 FREE SERPL-MCNC: 1.2 NG/DL (ref 0.8–1.7)
TRIGL SERPL-MCNC: 75 MG/DL (ref 30–149)
TSI SER-ACNC: 0.28 MIU/ML (ref 0.36–3.74)
VLDLC SERPL CALC-MCNC: 15 MG/DL (ref 0–30)
WBC # BLD AUTO: 6.6 X10(3) UL (ref 4–11)

## 2019-08-19 PROCEDURE — 84439 ASSAY OF FREE THYROXINE: CPT

## 2019-08-19 PROCEDURE — 73560 X-RAY EXAM OF KNEE 1 OR 2: CPT | Performed by: INTERNAL MEDICINE

## 2019-08-19 PROCEDURE — 85025 COMPLETE CBC W/AUTO DIFF WBC: CPT

## 2019-08-19 PROCEDURE — 80053 COMPREHEN METABOLIC PANEL: CPT

## 2019-08-19 PROCEDURE — 84443 ASSAY THYROID STIM HORMONE: CPT

## 2019-08-19 PROCEDURE — 36415 COLL VENOUS BLD VENIPUNCTURE: CPT

## 2019-08-19 PROCEDURE — 80061 LIPID PANEL: CPT

## 2019-08-22 ENCOUNTER — TELEPHONE (OUTPATIENT)
Dept: INTERNAL MEDICINE CLINIC | Facility: CLINIC | Age: 64
End: 2019-08-22

## 2019-08-22 DIAGNOSIS — E89.0 POSTOPERATIVE HYPOTHYROIDISM: Primary | ICD-10-CM

## 2019-08-22 RX ORDER — LEVOTHYROXINE SODIUM 175 UG/1
175 TABLET ORAL
Qty: 90 TABLET | Refills: 0 | Status: SHIPPED | OUTPATIENT
Start: 2019-08-22 | End: 2019-12-02

## 2019-08-28 ENCOUNTER — HOSPITAL ENCOUNTER (OUTPATIENT)
Dept: CV DIAGNOSTICS | Age: 64
Discharge: HOME | End: 2019-08-28
Attending: INTERNAL MEDICINE
Payer: MEDICARE

## 2019-08-28 DIAGNOSIS — I49.9 IRREGULAR HEART RHYTHM: ICD-10-CM

## 2019-08-28 DIAGNOSIS — I10 ESSENTIAL HYPERTENSION: ICD-10-CM

## 2019-08-28 PROCEDURE — 93018 CV STRESS TEST I&R ONLY: CPT | Performed by: INTERNAL MEDICINE

## 2019-08-28 PROCEDURE — 93227 XTRNL ECG REC<48 HR R&I: CPT | Performed by: INTERNAL MEDICINE

## 2019-08-28 PROCEDURE — 93225 XTRNL ECG REC<48 HRS REC: CPT | Performed by: INTERNAL MEDICINE

## 2019-08-28 PROCEDURE — 93350 STRESS TTE ONLY: CPT | Performed by: INTERNAL MEDICINE

## 2019-08-28 PROCEDURE — 93226 XTRNL ECG REC<48 HR SCAN A/R: CPT | Performed by: INTERNAL MEDICINE

## 2019-08-28 PROCEDURE — 93017 CV STRESS TEST TRACING ONLY: CPT | Performed by: INTERNAL MEDICINE

## 2019-09-19 ENCOUNTER — OFFICE VISIT (OUTPATIENT)
Dept: INTERNAL MEDICINE CLINIC | Facility: CLINIC | Age: 64
End: 2019-09-19
Payer: MEDICARE

## 2019-09-19 VITALS
BODY MASS INDEX: 32.57 KG/M2 | RESPIRATION RATE: 16 BRPM | HEIGHT: 65 IN | HEART RATE: 85 BPM | DIASTOLIC BLOOD PRESSURE: 74 MMHG | OXYGEN SATURATION: 99 % | TEMPERATURE: 98 F | WEIGHT: 195.5 LBS | SYSTOLIC BLOOD PRESSURE: 116 MMHG

## 2019-09-19 DIAGNOSIS — R25.2 MUSCLE CRAMPS: ICD-10-CM

## 2019-09-19 DIAGNOSIS — M25.522 LEFT ELBOW PAIN: Primary | ICD-10-CM

## 2019-09-19 DIAGNOSIS — F41.1 GAD (GENERALIZED ANXIETY DISORDER): ICD-10-CM

## 2019-09-19 DIAGNOSIS — R53.83 FATIGUE, UNSPECIFIED TYPE: ICD-10-CM

## 2019-09-19 DIAGNOSIS — M47.816 SPONDYLOSIS OF LUMBAR REGION WITHOUT MYELOPATHY OR RADICULOPATHY: ICD-10-CM

## 2019-09-19 DIAGNOSIS — M15.9 PRIMARY OSTEOARTHRITIS INVOLVING MULTIPLE JOINTS: ICD-10-CM

## 2019-09-19 PROCEDURE — 99214 OFFICE O/P EST MOD 30 MIN: CPT | Performed by: INTERNAL MEDICINE

## 2019-09-19 RX ORDER — ALPRAZOLAM 0.25 MG/1
TABLET ORAL
Qty: 60 TABLET | Refills: 1 | Status: SHIPPED | OUTPATIENT
Start: 2019-09-19 | End: 2020-03-02

## 2019-09-19 RX ORDER — INDOMETHACIN 50 MG/1
50 CAPSULE ORAL 2 TIMES DAILY PRN
Qty: 180 CAPSULE | Refills: 1 | Status: SHIPPED | OUTPATIENT
Start: 2019-09-19 | End: 2020-05-28

## 2019-09-19 RX ORDER — CYCLOBENZAPRINE HCL 10 MG
10 TABLET ORAL 3 TIMES DAILY PRN
Qty: 30 TABLET | Refills: 1 | Status: SHIPPED | OUTPATIENT
Start: 2019-09-19 | End: 2021-01-26

## 2019-09-19 RX ORDER — CYCLOBENZAPRINE HCL 10 MG
10 TABLET ORAL 3 TIMES DAILY PRN
Qty: 30 TABLET | Refills: 5 | Status: CANCELLED | OUTPATIENT
Start: 2019-09-19

## 2019-09-19 NOTE — PROGRESS NOTES
Chapin Fernando is a 59year old female. HPI:   Patient presents for medication f/u, ? side effects --     Hands and calf, toe cramps x 1 month, 4-5 x per week  Not much water -- working out x3 weeks, no physical activity prior to-- she's trying to sailsquare Disp: 30 tablet, Rfl: 5  Past Medical History:   Diagnosis Date   • Anesthesia complication    • Cancer ([facility] [address]     thyroid CA 10 yrs ago   • Esophageal reflux    • Kidney stone    • Normal delivery     x3   • Other screening mammogram 2/07/2012   • Other sc sensation  PSYCH: + insomnia. No anxiety, depressed mood  EXAM:   /74   Pulse 85   Temp 97.8 °F (36.6 °C) (Oral)   Resp 16   Ht 65\"   Wt 195 lb 8 oz   SpO2 99%   BMI 32.53 kg/m²   GENERAL: A&O x 4, well-appearing, appropriate and cooperative.  NAD  S tender over medial epicondyle (though no appreciable bony tenderness) with no improvement since onset.    --XR ordered  --counseled on ice/heat in 20 minute intervals 3-4x/day per patient preference for sx relief  --avoid direct pressure    The patient ind

## 2019-09-19 NOTE — PROGRESS NOTES
Anotnieta Dwyer is a 59year old female. HPI:   Patient presents with:  Medication Follow-Up    Patient presents to discuss several issues. Thinks she is having side effects from decreased levothyroxine. Increased fatigue and tiredness.   Also not sleep LOSARTAN 100 MG Oral Tab, TAKE 1 TABLET(100 MG) BY MOUTH DAILY, Disp: 90 tablet, Rfl: 0  •  Esomeprazole Magnesium (NEXIUM) 40 MG Oral Capsule Delayed Release, Take 1 capsule (40 mg total) by mouth every morning before breakfast., Disp: 90 capsule, Rfl: 1 left elbow  PSYCH: pleasant, appropriate mood and affect  ASSESSMENT AND PLAN:   1. Left elbow pain  For past week. Significant pain. Unclear etiology. No injury to elbow. Will check XR elbow. - XR ELBOW, COMPLETE (MIN 3 VIEWS), LEFT (CPT=73080);  Futu

## 2019-09-27 RX ORDER — LOSARTAN POTASSIUM 100 MG/1
TABLET ORAL
Qty: 90 TABLET | Refills: 0 | Status: SHIPPED | OUTPATIENT
Start: 2019-09-27 | End: 2019-12-26

## 2019-09-27 NOTE — TELEPHONE ENCOUNTER
Losartan 100 Mg  Last OV relevant to medication: 8-15-19  Last refill date: 6-8-19  #/refills: 0  When pt was asked to return for OV: 6 mo.   Upcoming appt/reason: none  Recent labs: 8-19-19: CMP

## 2019-09-30 ENCOUNTER — TELEPHONE (OUTPATIENT)
Dept: INTERNAL MEDICINE CLINIC | Facility: CLINIC | Age: 64
End: 2019-09-30

## 2019-09-30 NOTE — TELEPHONE ENCOUNTER
Received a fax from [facility] in [place]. Plan does not cover Indomethacin 50 mg - 1 capsule by mouth BID PRN for Pain.      Do you want to proceed with PA?    [phone] NP#[id number]

## 2019-10-02 NOTE — TELEPHONE ENCOUNTER
Received approval for medication Indomethacin. Approved 10/2/2019 - 10/2/2020. Placed notification in your inbasket.

## 2019-10-21 RX ORDER — CALCITRIOL 0.5 UG/1
CAPSULE, LIQUID FILLED ORAL
Qty: 90 CAPSULE | Refills: 0 | OUTPATIENT
Start: 2019-10-21

## 2019-10-21 NOTE — TELEPHONE ENCOUNTER
No protocol    Requesting CALCITRIOL 0.5 MCG Oral Cap  LOV: 9/19/19  RTC: 6 months  Last Relevant Labs: 8/19/19  Filled: 7/18/19 #90 with 1 refills    No future appointments.

## 2019-12-02 DIAGNOSIS — E89.0 POSTOPERATIVE HYPOTHYROIDISM: ICD-10-CM

## 2019-12-03 RX ORDER — LEVOTHYROXINE SODIUM 175 MCG
TABLET ORAL
Qty: 90 TABLET | Refills: 0 | Status: SHIPPED | OUTPATIENT
Start: 2019-12-03 | End: 2020-01-27

## 2019-12-03 NOTE — TELEPHONE ENCOUNTER
Failed protoocl     Last refill:  8/22/2019 Synthroid 175 mcg #90 NR    Last TSH - 8/19/2019 repear in 6 weeks    LOV:   9/19/2019 Dr Rubén Conte RTC 6 months  3. Fatigue, unspecified type  Patient feels more fatigued after change in levothyroxine dose.   Will

## 2019-12-26 RX ORDER — LOSARTAN POTASSIUM 100 MG/1
TABLET ORAL
Qty: 90 TABLET | Refills: 0 | Status: SHIPPED | OUTPATIENT
Start: 2019-12-26 | End: 2020-03-02

## 2019-12-26 NOTE — TELEPHONE ENCOUNTER
Dr. Gwendlyn Jeans pt. Losartan 100 MG  Last OV relevant to medication: 8-15-19  Last refill date: 9-27-19  #/refills: 0  When pt was asked to return for OV: 6 mo.   Upcoming appt/reason: 12-30-19  Recent labs: 8-19-19: CMP

## 2019-12-30 ENCOUNTER — OFFICE VISIT (OUTPATIENT)
Dept: INTERNAL MEDICINE CLINIC | Facility: CLINIC | Age: 64
End: 2019-12-30
Payer: MEDICARE

## 2019-12-30 VITALS
SYSTOLIC BLOOD PRESSURE: 120 MMHG | WEIGHT: 199.5 LBS | HEIGHT: 65 IN | DIASTOLIC BLOOD PRESSURE: 64 MMHG | RESPIRATION RATE: 18 BRPM | BODY MASS INDEX: 33.24 KG/M2 | HEART RATE: 71 BPM | TEMPERATURE: 98 F | OXYGEN SATURATION: 98 %

## 2019-12-30 DIAGNOSIS — E89.0 POSTOPERATIVE HYPOTHYROIDISM: ICD-10-CM

## 2019-12-30 DIAGNOSIS — I10 ESSENTIAL HYPERTENSION: ICD-10-CM

## 2019-12-30 DIAGNOSIS — M25.551 BILATERAL HIP PAIN: ICD-10-CM

## 2019-12-30 DIAGNOSIS — M25.552 BILATERAL HIP PAIN: ICD-10-CM

## 2019-12-30 DIAGNOSIS — M17.0 PRIMARY OSTEOARTHRITIS OF BOTH KNEES: Primary | ICD-10-CM

## 2019-12-30 PROCEDURE — 99214 OFFICE O/P EST MOD 30 MIN: CPT | Performed by: INTERNAL MEDICINE

## 2019-12-30 RX ORDER — HYDROCODONE BITARTRATE AND ACETAMINOPHEN 5; 325 MG/1; MG/1
TABLET ORAL
COMMUNITY
Start: 2019-10-10 | End: 2020-06-30

## 2019-12-30 NOTE — PROGRESS NOTES
Ana María Barrera is a 59year old female. HPI:   Patient presents with:  Difficulty Walking: patient has knee replacement on right side    Patient presents to discuss several issues.   She had a left knee replacement 10 months ago with her orthopedic surge CAPSULE(0.5 MCG) BY MOUTH DAILY, Disp: 90 capsule, Rfl: 1  •  HYDROcodone-acetaminophen 5-325 MG Oral Tab, TK 1 OR 2 TS PO Q 6 H PRN, Disp: , Rfl:   •  indomethacin 50 MG Oral Cap, Take 1 capsule (50 mg total) by mouth 2 (two) times daily as needed (pain). PERRLA, EOMI, normal lid and conjunctiva  LUNGS: clear to auscultation bilaterally, no wheezing/rubs  CARDIO: RRR without murmurs. No clubbing, cyanosis or edema.   GI: soft non tender nondistended no hepatosplenomegaly, bowel sounds throughout  NEURO: CN indicates understanding of these issues and agrees to the plan. The patient is asked to return to clinic in 3-6 months with Dr. Tomas Patel MD for follow up on chronic issues, or earlier if acute issues arise.     Collette Merino MD

## 2019-12-30 NOTE — PATIENT INSTRUCTIONS
- Follow up with Dr. Cary Ortiz, Anika Saldivar specialist.  - Try and get your CD's of images from [address] [place] [address] before you see Dr. Edgardo Burris. It was a pleasure seeing you in the clinic today.   Thank you for choosing the [address] [place] [address] Ave

## 2020-01-06 ENCOUNTER — HOSPITAL ENCOUNTER (OUTPATIENT)
Dept: GENERAL RADIOLOGY | Age: 65
Discharge: HOME | End: 2020-01-06
Attending: ORTHOPAEDIC SURGERY
Payer: MEDICARE

## 2020-01-06 DIAGNOSIS — Z96.653 HISTORY OF BILATERAL KNEE REPLACEMENT: ICD-10-CM

## 2020-01-06 PROCEDURE — 73562 X-RAY EXAM OF KNEE 3: CPT | Performed by: ORTHOPAEDIC SURGERY

## 2020-01-10 ENCOUNTER — OFFICE VISIT (OUTPATIENT)
Dept: ORTHOPEDICS CLINIC | Facility: CLINIC | Age: 65
End: 2020-01-10
Payer: MEDICARE

## 2020-01-10 VITALS
RESPIRATION RATE: 16 BRPM | WEIGHT: 199 LBS | HEIGHT: 65 IN | BODY MASS INDEX: 33.15 KG/M2 | HEART RATE: 86 BPM | OXYGEN SATURATION: 97 %

## 2020-01-10 DIAGNOSIS — Z96.651 PAIN DUE TO TOTAL RIGHT KNEE REPLACEMENT, INITIAL ENCOUNTER (HCC): Primary | ICD-10-CM

## 2020-01-10 DIAGNOSIS — T84.84XA PAIN DUE TO TOTAL RIGHT KNEE REPLACEMENT, INITIAL ENCOUNTER (HCC): Primary | ICD-10-CM

## 2020-01-10 PROCEDURE — 99203 OFFICE O/P NEW LOW 30 MIN: CPT | Performed by: ORTHOPAEDIC SURGERY

## 2020-01-10 NOTE — H&P
[facility] New Patient Note    CC: Patient presents with:  Consult: right knee pain most recently since 3/22/19. R TKA  and CONNIE 10 months ago with Dr. Sobia Alejo @ [facility].  limited ROM. pain goes down into lower leg and up into the thigh. Anesthesia complication    • Cancer ([facility] [address]     thyroid CA 10 yrs ago   • Esophageal reflux    • Kidney stone    • Normal delivery     x3   • Other screening mammogram 2/07/2012   • Other screening mammogram 1/17/2011   • Pain in joint, ankle and foot 9/14/2 (NEXIUM) 40 MG Oral Capsule Delayed Release Take 1 capsule (40 mg total) by mouth every morning before breakfast. 90 capsule 1     No Known Allergies  Family History   Problem Relation Age of Onset   • Heart Disease Father    • Heart Disease Mother      So extremity warm and well-perfused      Imaging: X-rays of the right knee demonstrate a Joy cemented total knee cruciate retaining with joint line approximately 15 to 20 mm above the fibular head, femoral component and 7 degrees valgus and appears in appr after surgery includes recurrence of stiffness.   Pain may not completely resolved following revision surgery, especially given that she does have some radiating pain down the whole lower extremity that previously had some response to a spinal injection and

## 2020-01-20 ENCOUNTER — APPOINTMENT (OUTPATIENT)
Dept: LAB | Age: 65
End: 2020-01-20
Attending: SURGERY
Payer: MEDICARE

## 2020-01-20 DIAGNOSIS — T84.84XA PAIN DUE TO TOTAL RIGHT KNEE REPLACEMENT, INITIAL ENCOUNTER (HCC): ICD-10-CM

## 2020-01-20 DIAGNOSIS — Z96.651 PAIN DUE TO TOTAL RIGHT KNEE REPLACEMENT, INITIAL ENCOUNTER (HCC): ICD-10-CM

## 2020-01-20 DIAGNOSIS — R25.2 MUSCLE CRAMPS: ICD-10-CM

## 2020-01-20 DIAGNOSIS — E89.0 POSTOPERATIVE HYPOTHYROIDISM: ICD-10-CM

## 2020-01-20 LAB
ALBUMIN SERPL-MCNC: 3.5 G/DL (ref 3.4–5)
ALBUMIN/GLOB SERPL: 0.9 {RATIO} (ref 1–2)
ALP LIVER SERPL-CCNC: 69 U/L (ref 50–130)
ALT SERPL-CCNC: 25 U/L (ref 13–56)
ANION GAP SERPL CALC-SCNC: 8 MMOL/L (ref 0–18)
AST SERPL-CCNC: 14 U/L (ref 15–37)
BILIRUB SERPL-MCNC: 0.5 MG/DL (ref 0.1–2)
BUN BLD-MCNC: 17 MG/DL (ref 7–18)
BUN/CREAT SERPL: 25.4 (ref 10–20)
CALCIUM BLD-MCNC: 8.9 MG/DL (ref 8.5–10.1)
CHLORIDE SERPL-SCNC: 109 MMOL/L (ref 98–112)
CK SERPL-CCNC: 58 U/L (ref 26–192)
CO2 SERPL-SCNC: 24 MMOL/L (ref 21–32)
CREAT BLD-MCNC: 0.67 MG/DL (ref 0.55–1.02)
CRP SERPL-MCNC: <0.29 MG/DL (ref ?–0.3)
GLOBULIN PLAS-MCNC: 4 G/DL (ref 2.8–4.4)
GLUCOSE BLD-MCNC: 107 MG/DL (ref 70–99)
M PROTEIN MFR SERPL ELPH: 7.5 G/DL (ref 6.4–8.2)
OSMOLALITY SERPL CALC.SUM OF ELEC: 294 MOSM/KG (ref 275–295)
PATIENT FASTING Y/N/NP: NO
POTASSIUM SERPL-SCNC: 3.4 MMOL/L (ref 3.5–5.1)
SED RATE-ML: 23 MM/HR (ref 0–25)
SODIUM SERPL-SCNC: 141 MMOL/L (ref 136–145)
T4 FREE SERPL-MCNC: 1.4 NG/DL (ref 0.8–1.7)
TSI SER-ACNC: 0.3 MIU/ML (ref 0.36–3.74)

## 2020-01-20 PROCEDURE — 84443 ASSAY THYROID STIM HORMONE: CPT

## 2020-01-20 PROCEDURE — 80053 COMPREHEN METABOLIC PANEL: CPT

## 2020-01-20 PROCEDURE — 86140 C-REACTIVE PROTEIN: CPT

## 2020-01-20 PROCEDURE — 82550 ASSAY OF CK (CPK): CPT

## 2020-01-20 PROCEDURE — 36415 COLL VENOUS BLD VENIPUNCTURE: CPT

## 2020-01-20 PROCEDURE — 85652 RBC SED RATE AUTOMATED: CPT

## 2020-01-20 PROCEDURE — 84439 ASSAY OF FREE THYROXINE: CPT

## 2020-01-20 NOTE — TELEPHONE ENCOUNTER
Last OV: 12/30/19 with Dr. Lui Bonner   Last refill date: 7/18/19     #/refills: #90, 1 refill  When pt was asked to return for OV: 3-6 months  Upcoming appt/reason: no upcoming appt  Last labs TODAY (1/20/2020)

## 2020-01-21 RX ORDER — CALCITRIOL 0.5 UG/1
CAPSULE, LIQUID FILLED ORAL
Qty: 90 CAPSULE | Refills: 1 | Status: ON HOLD | OUTPATIENT
Start: 2020-01-21 | End: 2020-07-07

## 2020-01-24 ENCOUNTER — TELEPHONE (OUTPATIENT)
Dept: ORTHOPEDICS CLINIC | Facility: CLINIC | Age: 65
End: 2020-01-24

## 2020-01-24 ENCOUNTER — OFFICE VISIT (OUTPATIENT)
Dept: ORTHOPEDICS CLINIC | Facility: CLINIC | Age: 65
End: 2020-01-24
Payer: MEDICARE

## 2020-01-24 VITALS
WEIGHT: 199 LBS | HEART RATE: 84 BPM | HEIGHT: 65 IN | RESPIRATION RATE: 16 BRPM | BODY MASS INDEX: 33.15 KG/M2 | OXYGEN SATURATION: 97 %

## 2020-01-24 DIAGNOSIS — Z96.651 PAIN DUE TO TOTAL RIGHT KNEE REPLACEMENT, SUBSEQUENT ENCOUNTER: Primary | ICD-10-CM

## 2020-01-24 DIAGNOSIS — T84.84XD PAIN DUE TO TOTAL RIGHT KNEE REPLACEMENT, SUBSEQUENT ENCOUNTER: Primary | ICD-10-CM

## 2020-01-24 PROCEDURE — 99213 OFFICE O/P EST LOW 20 MIN: CPT | Performed by: ORTHOPAEDIC SURGERY

## 2020-01-24 NOTE — PROGRESS NOTES
[facility] Progress Note    Subjective: Patient returns to discuss right knee. She has obtained her outside operative report, as well as a CT scan. She reports her symptoms have not changed. She reports pain all about the knee.   She reports stiffn as scar tissue resection and polyethylene exchange, or as most as revision of both components.   We discussed risks and benefits of surgery, with risks including but not limited to infection, blood clots, fracture, bleeding/need for blood transfusion, injur

## 2020-01-24 NOTE — TELEPHONE ENCOUNTER
S/w Александр spaulding and informed her that her right knee CT scan is approved. Pt stated that she will call and schedule. No further questions or concerns.

## 2020-01-25 NOTE — TELEPHONE ENCOUNTER
Submitted through cover my meds:  Miguel Chow Key: [id number] – PA Case ID: [id number] HISTORY  66y Female PMHx of morbid obesity, COPD, HTN, and GERD who presented to the ED on 11/26/2019 with abdominal pain and distension. Patient states that she had been having constipation for ~2 weeks with no improvement despite laxative and enema use. Imaging revealed perforated sigmoid diverticulitis with free air. Hospital course is as follows:    11/26 - s/p exploratory laparotomy, extended left hemicolectomy, and left in discontinuity w/ Abthera VAC placement, left intubated post-operatively so she was admitted to SICU for further management  11/27 - extubated to BiPAP  11/29 - re-exploratory laparotomy, transverse end colostomy, and fascial closure with wound VAC placement, left intubated at end of case  11/30 - extubated   12/04 - transferred to the floors  12/06 - acute inability to tolerate PO, complaining of nausea & vomiting  12/09 - esophagram demonstrated a stricture in the distal esophagus  12/10 - EGD demonstrated diverticula, a medium-sized hiatal hernia, esophagitis, and gastritis, started on promotility agents and diet was restarted  12/11 - noted to have different BP when BP cuff placed on different arms, bilateral UE Duplex demonstrated stenosis of the right brachiocephalic artery concerning for subclavian steal syndrome  12/28 - plastic surgery consulted for abdominal wall reconstruction, underwent bedside debridement of abdominal wound at that time.  01/07 - worsening hypotension from presumed sepsis requiring readmission to SICU, imaging revealed loculated fluid collections in the left anterior abdomen & a small abdominal wall collection, both of which were not amenable to drainage, cultures were also positive for E. coli & Klebsiella in the urine, Stenotrophomonas in the sputum, & Staph epidermis in the blood, LUE PICC discontinued  01/10 - right IJ CVC was placed and patient started on vasopressor support but patient refused arterial line placement, patient complaining of chest wall tightness, hsT was elevated and TTE demonstrated new mild to moderate segmental LV systolic dysfunction w/ hypokinesis of the inferior and inferolateral walls but cardiology determined the troponin leak to be stressed-induced in the setting of sepsis  01/13 - dermatology consulted scattered erythematous patches on her trunk & extremities, superficial desquamation of the face, trunk, & extremities, & multiple erosions with surrounding erythema on left lower back, for which petroleum jelly was prescribed BID, viral cultures of erosions were sent and were negative  01/16 - repeat CT scan demonstrated decreasing fluid collections but no new acute findings  01/20 - weaned off vasopressor support, started levothyroxine for elevated TSH and decreased T3 & T4          24 HOUR EVENTS:  - Palliative consult called for refractory pain management  - Increased dilaudid to 1.5mg for turning  - Methadone increased to 10mg. BID  - Midodrine discontinued and Levophed started   - 500ml of 5% albumin administered.     SUBJECTIVE/ROS:  [ ] A ten-point review of systems was otherwise negative except as noted.  [ ] Due to altered mental status/intubation, subjective information were not able to be obtained from the patient. History was obtained, to the extent possible, from review of the chart and collateral sources of information.      NEURO  Exam: lethargic, yet alert and oriented  Meds: diphenhydrAMINE 25 milliGRAM(s) Oral every 12 hours PRN Rash and/or Itching  HYDROmorphone  Injectable 1.5 milliGRAM(s) IV Push every 6 hours PRN Turning  methadone    Tablet 10 milliGRAM(s) Oral <User Schedule>  oxyCODONE    IR 10 milliGRAM(s) Oral every 4 hours PRN Severe Pain (7 - 10)    [x] Adequacy of sedation and pain control has been assessed and adjusted      RESPIRATORY  RR: 22 (01-25-20 @ 04:45) (8 - 32)  SpO2: 94% (01-25-20 @ 04:45) (91% - 98%)  Exam: unlabored, clear to auscultation bilaterally  Mechanical Ventilation: none  [N/A] Extubation Readiness Assessed  Meds: albuterol/ipratropium for Nebulization 3 milliLiter(s) Nebulizer every 6 hours PRN Shortness of Breath and/or Wheezing  buDESOnide    Inhalation Suspension 0.5 milliGRAM(s) Inhalation every 12 hours        CARDIOVASCULAR  HR: 91 (01-25-20 @ 04:45) (67 - 98)  BP: 100/55 (01-25-20 @ 04:00) (79/44 - 156/101)  BP(mean): 75 (01-25-20 @ 04:00) (57 - 121)  VBG - ( 25 Jan 2020 00:13 )  pH: 7.32  /  pCO2: 53    /  pO2: 41    / HCO3: 26    / Base Excess: 0.2   /  SaO2: 69     Lactate: 2.5    Exam: regular rate and rhythm  Cardiac Rhythm: sinus  Perfusion     [x]Adequate   [ ]Inadequate  Mentation   [x]Normal       [ ]Reduced  Extremities  [x]Warm         [ ]Cool  Volume Status [ ]Hypervolemic [x]Euvolemic [ ]Hypovolemic  Meds: norepinephrine Infusion 0.02 MICROgram(s)/kG/Min IV Continuous <Continuous>        GI/NUTRITION  Exam: soft, nontender, nondistended, incision C/D/I  Diet: NPO with TF @ 45ml/hr   Meds: aluminum hydroxide/magnesium hydroxide/simethicone Suspension 30 milliLiter(s) Oral every 4 hours PRN Dyspepsia  pantoprazole    Tablet 40 milliGRAM(s) Oral <User Schedule>  polyethylene glycol 3350 17 Gram(s) Oral <User Schedule>      GENITOURINARY  I&O's Detail    01-23 @ 07:01 - 01-24 @ 07:00  --------------------------------------------------------  IN:    Albumin 5%  - 500 mL: 100 mL    dextrose 5% + sodium chloride 0.45%: 3450 mL    Enteral Tube Flush: 60 mL    Nepro: 80 mL    Solution: 600 mL    Solution: 100 mL    Solution: 600 mL  Total IN: 4990 mL    OUT:    Colostomy: 200 mL    Indwelling Catheter - Urethral: 650 mL    VAC (Vacuum Assisted Closure) System: 150 mL  Total OUT: 1000 mL    Total NET: 3990 mL      01-24 @ 07:01 - 01-25 @ 05:09  --------------------------------------------------------  IN:    Albumin 5%  - 500 mL: 600 mL    dextrose 5% + sodium chloride 0.45%: 3150 mL    Enteral Tube Flush: 300 mL    Nepro: 605 mL    norepinephrine Infusion: 63.5 mL    Solution: 700 mL    Solution: 1550 mL  Total IN: 6968.5 mL    OUT:    Indwelling Catheter - Urethral: 835 mL  Total OUT: 835 mL    Total NET: 6133.5 mL          01-25    133<L>  |  99  |  41<H>  ----------------------------<  152<H>  4.7   |  22  |  1.64<H>    Ca    7.5<L>      25 Jan 2020 00:27  Phos  3.9     01-25  Mg     2.2     01-25    TPro  5.8<L>  /  Alb  3.2<L>  /  TBili  0.5  /  DBili  0.3<H>  /  AST  45<H>  /  ALT  7<L>  /  AlkPhos  211<H>  01-25    [x] Ross catheter, indication: urine otuput monitoring in critically ill   Meds: albumin human 25% IVPB 50 milliLiter(s) IV Intermittent every 6 hours  dextrose 5% + sodium chloride 0.45% 1000 milliLiter(s) IV Continuous <Continuous>  thiamine IVPB 200 milliGRAM(s) IV Intermittent <User Schedule>        HEMATOLOGIC  Meds: aspirin  chewable 81 milliGRAM(s) Oral <User Schedule>  enoxaparin Injectable 100 milliGRAM(s) SubCutaneous <User Schedule>    [x] VTE Prophylaxis                        7.2    4.22  )-----------( 131      ( 25 Jan 2020 00:27 )             23.0       Transfusion     [ ] PRBC   [ ] Platelets   [ ] FFP   [ ] Cryoprecipitate      INFECTIOUS DISEASES  WBC Count: 4.22 K/uL (01-25 @ 00:27)    RECENT CULTURES:  Specimen Source: .Blood Blood-Venous  Date/Time: 01-23 @ 19:31  Culture Results:   Growth in aerobic bottle: Gram Negative Rods  Gram Stain:   Growth in aerobic bottle: Gram Negative Rods  Organism: --  Specimen Source: .Blood Blood-Peripheral  Date/Time: 01-22 @ 17:15  Culture Results:   Growth in aerobic bottle: Acinetobacter baumannii NOSOCOMIALIS GROUP  See previous culture [id number]  Gram Stain:   Growth in aerobic bottle: Gram Negative Rods  Organism: --  Specimen Source: .Blood Blood-Venous  Date/Time: 01-22 @ 15:13  Culture Results:   Growth in aerobic bottle: Acinetobacter baumannii nosocomialis group  Growth in aerobic bottle: Enterococcus faecium Susceptibility to follow.  ***Blood Panel PCR results on this specimen are available  approximately 3 hours after the Gram stain result.***  Gram stain, PCR, and/or culture results may not always  correspond due to difference in methodologies.  ************************************************************  This PCR assay was performed using [employer].  The following targets are tested for: Enterococcus,  vancomycin resistant enterococci, Listeria monocytogenes,  coagulase negative staphylococci, S. aureus,  methicillin resistant S. aureus, Streptococcus agalactiae  (Group B), S. pneumoniae, S. pyogenes (Group A),  Acinetobacter baumannii, Enterobacter cloacae, E. coli,  Klebsiella oxytoca, K. pneumoniae, Proteus sp.,  Serratia marcescens, Haemophilus influenzae,  Neisseria meningitidis, Pseudomonas aeruginosa, Candida  albicans, C. glabrata, C krusei, C parapsilosis,  C. tropicalis and the KPC resistance gene.  Gram Stain:   Growth in aerobic bottle: Gram Negative Rods and Gram Positive Cocci in  Pairs and Chains  Organism: Blood Culture PCR    Meds: erythromycin     base Tablet 250 milliGRAM(s) Oral <User Schedule>  linezolid  IVPB      linezolid  IVPB 600 milliGRAM(s) IV Intermittent every 12 hours  meropenem  IVPB 1000 milliGRAM(s) IV Intermittent every 8 hours  polymyxin B IVPB 8329964 Unit(s) IV Intermittent every 12 hours        ENDOCRINE  CAPILLARY BLOOD GLUCOSE        Meds: atorvastatin 40 milliGRAM(s) Oral <User Schedule>  hydrocortisone sodium succinate Injectable 100 milliGRAM(s) IV Push every 8 hours  levothyroxine Injectable 25 MICROGram(s) IV Push at bedtime        ACCESS DEVICES:  [x] Peripheral IV  [ ] Central Venous Line	[ ] R	[ ] L	[ ] IJ	[ ] Fem	[ ] SC	Placed:   [ ] Arterial Line		[ ] R	[ ] L	[ ] Fem	[ ] Rad	[ ] Ax	Placed:   [ ] PICC:					[ ] Mediport  [ ] Urinary Catheter, Date Placed:   [x] Necessity of urinary, arterial, and venous catheters discussed    OTHER MEDICATIONS:  AQUAPHOR (petrolatum Ointment) 1 Application(s) Topical three times a day  artificial tears (preservative free) Ophthalmic Solution 1 Drop(s) Both EYES two times a day  chlorhexidine 2% Cloths 1 Application(s) Topical <User Schedule>  clobetasol 0.05% Ointment 1 Application(s) Topical two times a day  FIRST- Mouthwash  BLM 5 milliLiter(s) Swish and Spit four times a day  lidocaine   Patch 1 Patch Transdermal daily  lidocaine 2% Viscous 10 milliLiter(s) Swish and Spit once  nystatin Powder 1 Application(s) Topical two times a day  silver nitrate Applicator 1 Application(s) Topical once  zinc oxide 20% Ointment 1 Application(s) Topical three times a day PRN      CODE STATUS: full code       IMAGING: < from: CT Abdomen and Pelvis w/ Oral Cont (01.23.20 @ 18:16) >  MPRESSION:     Multifocal airspace opacities favoring multifocal pneumonia..    No acute pathologyin the abdomen/pelvis.      < end of copied text >

## 2020-01-27 DIAGNOSIS — E89.0 POSTOPERATIVE HYPOTHYROIDISM: Primary | ICD-10-CM

## 2020-01-27 RX ORDER — LEVOTHYROXINE SODIUM 0.15 MG/1
150 TABLET ORAL
Qty: 90 TABLET | Refills: 0 | Status: SHIPPED | OUTPATIENT
Start: 2020-01-27 | End: 2020-04-16

## 2020-01-28 ENCOUNTER — OFFICE VISIT (OUTPATIENT)
Dept: INTERNAL MEDICINE CLINIC | Facility: CLINIC | Age: 65
End: 2020-01-28
Payer: MEDICARE

## 2020-01-28 ENCOUNTER — TELEPHONE (OUTPATIENT)
Dept: ORTHOPEDICS CLINIC | Facility: CLINIC | Age: 65
End: 2020-01-28

## 2020-01-28 VITALS
OXYGEN SATURATION: 97 % | RESPIRATION RATE: 18 BRPM | BODY MASS INDEX: 33.15 KG/M2 | TEMPERATURE: 99 F | HEART RATE: 93 BPM | HEIGHT: 65 IN | WEIGHT: 199 LBS | SYSTOLIC BLOOD PRESSURE: 138 MMHG | DIASTOLIC BLOOD PRESSURE: 78 MMHG

## 2020-01-28 DIAGNOSIS — M77.02 MEDIAL EPICONDYLITIS OF LEFT ELBOW: Primary | ICD-10-CM

## 2020-01-28 DIAGNOSIS — N64.4 BREAST PAIN, LEFT: ICD-10-CM

## 2020-01-28 DIAGNOSIS — N63.24 BREAST LUMP ON LEFT SIDE AT 7 O'CLOCK POSITION: ICD-10-CM

## 2020-01-28 PROCEDURE — G0008 ADMIN INFLUENZA VIRUS VAC: HCPCS | Performed by: PHYSICIAN ASSISTANT

## 2020-01-28 PROCEDURE — 99214 OFFICE O/P EST MOD 30 MIN: CPT | Performed by: PHYSICIAN ASSISTANT

## 2020-01-28 PROCEDURE — 90686 IIV4 VACC NO PRSV 0.5 ML IM: CPT | Performed by: PHYSICIAN ASSISTANT

## 2020-01-28 NOTE — TELEPHONE ENCOUNTER
Dr. Jolyn Osler sent referral for Pain Right Knee, TKR 4/2020. [facility], RN & PT. Is this for now or post surgery? Please clarify.

## 2020-01-28 NOTE — PATIENT INSTRUCTIONS
Please call Berlin Polanco at [phone] to set up the following tests:  - diagnostic mammogram    Understanding Medial Epicondylitis  - Aleve - 2 tablets twice a day WITH FOOD x 5 days (then may take 1 tablet twice a day with food · Injections of medicine. This may relieve symptoms. If other treatments do not relieve symptoms, you may need surgery. Possible complications  If you don’t give your elbow time to heal, symptoms may return or get worse.  Follow your healthcare provider’s

## 2020-02-21 ENCOUNTER — APPOINTMENT (OUTPATIENT)
Dept: LAB | Age: 65
End: 2020-02-21
Attending: INTERNAL MEDICINE
Payer: MEDICARE

## 2020-02-21 DIAGNOSIS — E89.0 POSTOPERATIVE HYPOTHYROIDISM: ICD-10-CM

## 2020-02-21 LAB
T4 FREE SERPL-MCNC: 1.3 NG/DL (ref 0.8–1.7)
TSI SER-ACNC: 1.2 MIU/ML (ref 0.36–3.74)

## 2020-02-21 PROCEDURE — 84443 ASSAY THYROID STIM HORMONE: CPT

## 2020-02-21 PROCEDURE — 36415 COLL VENOUS BLD VENIPUNCTURE: CPT

## 2020-02-21 PROCEDURE — 84439 ASSAY OF FREE THYROXINE: CPT

## 2020-02-27 ENCOUNTER — HOSPITAL ENCOUNTER (OUTPATIENT)
Dept: MAMMOGRAPHY | Age: 65
Discharge: HOME | End: 2020-02-27
Attending: PHYSICIAN ASSISTANT
Payer: MEDICARE

## 2020-02-27 ENCOUNTER — HOSPITAL ENCOUNTER (OUTPATIENT)
Dept: ULTRASOUND IMAGING | Age: 65
Discharge: HOME | End: 2020-02-27
Attending: PHYSICIAN ASSISTANT
Payer: MEDICARE

## 2020-02-27 DIAGNOSIS — N64.4 BREAST PAIN, LEFT: ICD-10-CM

## 2020-02-27 DIAGNOSIS — N63.24 BREAST LUMP ON LEFT SIDE AT 7 O'CLOCK POSITION: ICD-10-CM

## 2020-02-27 PROCEDURE — 76642 ULTRASOUND BREAST LIMITED: CPT | Performed by: PHYSICIAN ASSISTANT

## 2020-02-27 PROCEDURE — 77062 BREAST TOMOSYNTHESIS BI: CPT | Performed by: PHYSICIAN ASSISTANT

## 2020-02-27 PROCEDURE — 77066 DX MAMMO INCL CAD BI: CPT | Performed by: PHYSICIAN ASSISTANT

## 2020-03-02 ENCOUNTER — TELEPHONE (OUTPATIENT)
Dept: ORTHOPEDICS CLINIC | Facility: CLINIC | Age: 65
End: 2020-03-02

## 2020-03-02 ENCOUNTER — OFFICE VISIT (OUTPATIENT)
Dept: INTERNAL MEDICINE CLINIC | Facility: CLINIC | Age: 65
End: 2020-03-02
Payer: MEDICARE

## 2020-03-02 VITALS
HEIGHT: 65 IN | SYSTOLIC BLOOD PRESSURE: 122 MMHG | BODY MASS INDEX: 33.41 KG/M2 | WEIGHT: 200.5 LBS | TEMPERATURE: 99 F | HEART RATE: 68 BPM | DIASTOLIC BLOOD PRESSURE: 78 MMHG

## 2020-03-02 DIAGNOSIS — E66.09 CLASS 1 OBESITY DUE TO EXCESS CALORIES WITH SERIOUS COMORBIDITY AND BODY MASS INDEX (BMI) OF 33.0 TO 33.9 IN ADULT: ICD-10-CM

## 2020-03-02 DIAGNOSIS — Z01.818 PREOPERATIVE CLEARANCE: Primary | ICD-10-CM

## 2020-03-02 DIAGNOSIS — M25.561 CHRONIC KNEE PAIN AFTER TOTAL REPLACEMENT OF RIGHT KNEE JOINT: ICD-10-CM

## 2020-03-02 DIAGNOSIS — Z96.651 CHRONIC KNEE PAIN AFTER TOTAL REPLACEMENT OF RIGHT KNEE JOINT: ICD-10-CM

## 2020-03-02 DIAGNOSIS — Z86.73 HISTORY OF ARTERIAL ISCHEMIC STROKE: ICD-10-CM

## 2020-03-02 DIAGNOSIS — I10 ESSENTIAL HYPERTENSION: ICD-10-CM

## 2020-03-02 DIAGNOSIS — G89.29 CHRONIC KNEE PAIN AFTER TOTAL REPLACEMENT OF RIGHT KNEE JOINT: ICD-10-CM

## 2020-03-02 DIAGNOSIS — E89.0 POSTOPERATIVE HYPOTHYROIDISM: ICD-10-CM

## 2020-03-02 PROCEDURE — 99214 OFFICE O/P EST MOD 30 MIN: CPT | Performed by: INTERNAL MEDICINE

## 2020-03-02 RX ORDER — FUROSEMIDE 20 MG/1
TABLET ORAL
Qty: 90 TABLET | Refills: 1 | Status: SHIPPED | OUTPATIENT
Start: 2020-03-02

## 2020-03-02 RX ORDER — LOSARTAN POTASSIUM 100 MG/1
TABLET ORAL
Qty: 90 TABLET | Refills: 1 | Status: ON HOLD | OUTPATIENT
Start: 2020-03-02 | End: 2020-07-07

## 2020-03-02 RX ORDER — ATORVASTATIN CALCIUM 40 MG/1
40 TABLET, FILM COATED ORAL NIGHTLY
Qty: 90 TABLET | Refills: 1 | Status: SHIPPED | OUTPATIENT
Start: 2020-03-02 | End: 2020-04-08

## 2020-03-02 RX ORDER — ALPRAZOLAM 0.25 MG/1
TABLET ORAL
Qty: 60 TABLET | Refills: 1 | Status: SHIPPED | OUTPATIENT
Start: 2020-03-02 | End: 2021-01-15

## 2020-03-02 NOTE — PROGRESS NOTES
Patient presents with:  Pre-Op Exam: 4-7-20 R knee surgery        HPI: Noman Laws presents for pre-op clearance. She has chronic R knee pain after a previous history of total replacement of the R knee joint multiple years ago.   She will be under [facility] at [facility]]   • BRAD LOCALIZATION WIRE 1 SITE LEFT (RLB=[id number])  AGE 25    CLOGGED DUCT   • OTHER SURGICAL HISTORY  1/1/2005    thyroid surgery XRT   • OTHER SURGICAL HISTORY  05/2019    R knee CONNIE     Patient has no known allergies. Wt 200 lb 8 oz (90.9 kg)   BMI 33.36 kg/m²   Wt Readings from Last 6 Encounters:  03/02/20 : 200 lb 8 oz (90.9 kg)  01/28/20 : 199 lb (90.3 kg)  01/24/20 : 199 lb (90.3 kg)  01/10/20 : 199 lb (90.3 kg)  12/30/19 : 199 lb 8 oz (90.5 kg)  09/19/19 : 195 lb lifestyle measures. No new issues. 4. Class 1 obesity - Continue lifestyle efforts. 5. HypoTSH - Stable on prescription medication + lifestyle measures. No new issues. 6. RTC in the post-op period.   Total face to face time was 25 minutes, more than

## 2020-03-02 NOTE — TELEPHONE ENCOUNTER
Spoke with patient about upcoming surgery date. She states she is leaving for [place] on 3.10.2020 and will be back 3.26.2020. Patient is asking for something to help with pain while she is on her trip stating she is in severe pain. Please advise.

## 2020-03-03 ENCOUNTER — HOSPITAL ENCOUNTER (OUTPATIENT)
Dept: CT IMAGING | Facility: HOSPITAL | Age: 65
Discharge: HOME | End: 2020-03-03
Attending: ORTHOPAEDIC SURGERY
Payer: MEDICARE

## 2020-03-03 DIAGNOSIS — Z96.651 PAIN DUE TO TOTAL RIGHT KNEE REPLACEMENT, SUBSEQUENT ENCOUNTER: ICD-10-CM

## 2020-03-03 DIAGNOSIS — T84.84XD PAIN DUE TO TOTAL RIGHT KNEE REPLACEMENT, SUBSEQUENT ENCOUNTER: ICD-10-CM

## 2020-03-03 PROCEDURE — 76377 3D RENDER W/INTRP POSTPROCES: CPT

## 2020-03-03 PROCEDURE — 73700 CT LOWER EXTREMITY W/O DYE: CPT | Performed by: ORTHOPAEDIC SURGERY

## 2020-03-03 RX ORDER — MELOXICAM 15 MG/1
15 TABLET ORAL DAILY
Qty: 30 TABLET | Refills: 0 | Status: SHIPPED | OUTPATIENT
Start: 2020-03-03 | End: 2020-04-03

## 2020-03-03 NOTE — TELEPHONE ENCOUNTER
MD Franco Morrison RN 12 hours ago (7:36 PM)      Please ask patient if she is taking an antiinflammatory (her chart has indomethacin listed under her medications) - if not currently taking an NSAID, we can offer meloxicam. If she is t

## 2020-03-03 NOTE — TELEPHONE ENCOUNTER
S/w Corbin Luevano, stated that she is not taking the Indomethacin and would like the Meloxicam sent to her verified pharmacy on file. Pt was also provided the phone number for central scheduling to set up the CT scan for her right knee.   Pt verbalized underst

## 2020-03-04 RX ORDER — MELOXICAM 15 MG/1
TABLET ORAL
Qty: 90 TABLET | Refills: 0 | OUTPATIENT
Start: 2020-03-04

## 2020-03-18 ENCOUNTER — TELEPHONE (OUTPATIENT)
Dept: ORTHOPEDICS CLINIC | Facility: CLINIC | Age: 65
End: 2020-03-18

## 2020-03-18 DIAGNOSIS — Z96.651 PAIN DUE TO TOTAL RIGHT KNEE REPLACEMENT, SUBSEQUENT ENCOUNTER: Primary | ICD-10-CM

## 2020-03-18 DIAGNOSIS — T84.84XD PAIN DUE TO TOTAL RIGHT KNEE REPLACEMENT, SUBSEQUENT ENCOUNTER: Primary | ICD-10-CM

## 2020-03-24 ENCOUNTER — TELEPHONE (OUTPATIENT)
Dept: ORTHOPEDICS CLINIC | Facility: CLINIC | Age: 65
End: 2020-03-24

## 2020-03-24 NOTE — TELEPHONE ENCOUNTER
Spoke with patient over the phone today and explained the need to postpone elective surgeries (which includes revision knee replacement surgery) at this time due to coronavirus pandemic - patient expressed understanding with this discussion.  Patient can ho

## 2020-04-01 ENCOUNTER — TELEPHONE (OUTPATIENT)
Dept: INTERNAL MEDICINE CLINIC | Facility: CLINIC | Age: 65
End: 2020-04-01

## 2020-04-01 NOTE — TELEPHONE ENCOUNTER
Preop clearance paperwork faxed to Dr. Manuel Doss at [phone]    Confirmation rec'd and paperwork put in [address] folder in [place] POD

## 2020-04-03 RX ORDER — MELOXICAM 15 MG/1
TABLET ORAL
Qty: 30 TABLET | Refills: 0 | Status: SHIPPED | OUTPATIENT
Start: 2020-04-03 | End: 2020-05-28

## 2020-04-08 RX ORDER — ATORVASTATIN CALCIUM 40 MG/1
TABLET, FILM COATED ORAL
Qty: 90 TABLET | Refills: 1 | Status: ON HOLD | OUTPATIENT
Start: 2020-04-08 | End: 2020-07-07

## 2020-04-16 RX ORDER — LEVOTHYROXINE SODIUM 0.15 MG/1
TABLET ORAL
Qty: 90 TABLET | Refills: 0 | Status: ON HOLD | OUTPATIENT
Start: 2020-04-16 | End: 2020-07-07

## 2020-05-19 ENCOUNTER — TELEPHONE (OUTPATIENT)
Dept: INTERNAL MEDICINE CLINIC | Facility: CLINIC | Age: 65
End: 2020-05-19

## 2020-05-19 NOTE — TELEPHONE ENCOUNTER
Patient called requesting new prescription for Relafen. Stated that Dr. Jelani Rubio gave her a few samples to try to see if she liked it.      [facility] [address] [facility], [address] [place] [address] AT [address], [phone], [phone]

## 2020-05-25 RX ORDER — NABUMETONE 1000 MG/1
1 TABLET ORAL 2 TIMES DAILY
Qty: 60 TABLET | Refills: 2 | Status: SHIPPED | OUTPATIENT
Start: 2020-05-25 | End: 2020-05-28

## 2020-05-25 NOTE — TELEPHONE ENCOUNTER
Script sent to pharmacy. Gita Shaw. Gwendlyn Jeans, MD  Diplomate, American Board of Internal Medicine  Member, American College of [facility]  [address] [facility], [address], [facility], [address] [place] [address]  T: [id number]; F: [address]

## 2020-05-28 ENCOUNTER — OFFICE VISIT (OUTPATIENT)
Dept: INTERNAL MEDICINE CLINIC | Facility: CLINIC | Age: 65
End: 2020-05-28
Payer: MEDICARE

## 2020-05-28 VITALS
DIASTOLIC BLOOD PRESSURE: 82 MMHG | HEIGHT: 65 IN | TEMPERATURE: 98 F | RESPIRATION RATE: 16 BRPM | BODY MASS INDEX: 33.41 KG/M2 | HEART RATE: 82 BPM | WEIGHT: 200.5 LBS | SYSTOLIC BLOOD PRESSURE: 146 MMHG

## 2020-05-28 DIAGNOSIS — R73.01 ELEVATED FASTING GLUCOSE: ICD-10-CM

## 2020-05-28 DIAGNOSIS — R42 VERTIGO: ICD-10-CM

## 2020-05-28 DIAGNOSIS — M25.561 CHRONIC KNEE PAIN AFTER TOTAL REPLACEMENT OF RIGHT KNEE JOINT: ICD-10-CM

## 2020-05-28 DIAGNOSIS — M54.50 ACUTE RIGHT-SIDED LOW BACK PAIN WITHOUT SCIATICA: Primary | ICD-10-CM

## 2020-05-28 DIAGNOSIS — M15.9 PRIMARY OSTEOARTHRITIS INVOLVING MULTIPLE JOINTS: ICD-10-CM

## 2020-05-28 DIAGNOSIS — G89.29 CHRONIC KNEE PAIN AFTER TOTAL REPLACEMENT OF RIGHT KNEE JOINT: ICD-10-CM

## 2020-05-28 DIAGNOSIS — Z96.651 CHRONIC KNEE PAIN AFTER TOTAL REPLACEMENT OF RIGHT KNEE JOINT: ICD-10-CM

## 2020-05-28 DIAGNOSIS — R53.83 FATIGUE, UNSPECIFIED TYPE: ICD-10-CM

## 2020-05-28 DIAGNOSIS — H93.13 TINNITUS OF BOTH EARS: ICD-10-CM

## 2020-05-28 PROCEDURE — 99214 OFFICE O/P EST MOD 30 MIN: CPT | Performed by: INTERNAL MEDICINE

## 2020-05-28 RX ORDER — MECLIZINE HYDROCHLORIDE 25 MG/1
25 TABLET ORAL 3 TIMES DAILY PRN
Qty: 30 TABLET | Refills: 0 | Status: ON HOLD | OUTPATIENT
Start: 2020-05-28 | End: 2020-07-07

## 2020-05-28 RX ORDER — NABUMETONE 1000 MG/1
1 TABLET ORAL 2 TIMES DAILY
Qty: 60 TABLET | Refills: 2 | Status: SHIPPED | OUTPATIENT
Start: 2020-05-28 | End: 2020-06-01

## 2020-05-28 NOTE — PATIENT INSTRUCTIONS
- Get blood and urine tests done. Call [phone] to schedule appointment for labs.   You do not have to fast.  - I will send Dr. Moon James a message to let him know you are ready for the knee surgery  - Relafen (anti-inflammatory) prescription sent to

## 2020-05-28 NOTE — PROGRESS NOTES
Vladimir Walker is a 72year old female. HPI:   Patient presents with:  Back Pain: right side   Leg Pain    Patient presents with an acute musculoskeletal complaint. Patient has been dealing with pain in right lower back and right leg.   It has worsening TABLET(150 MCG) BY MOUTH BEFORE BREAKFAST, Disp: 90 tablet, Rfl: 0  •  ATORVASTATIN 40 MG Oral Tab, TAKE 1 TABLET(40 MG) BY MOUTH EVERY NIGHT, Disp: 90 tablet, Rfl: 1  •  losartan 100 MG Oral Tab, TAKE 1 TABLET(100 MG) BY MOUTH DAILY, Disp: 90 tablet, Rfl: previous alcohol use. She reports that she does not use drugs.   Wt Readings from Last 6 Encounters:  05/28/20 : 200 lb 8 oz (90.9 kg)  03/02/20 : 200 lb 8 oz (90.9 kg)  01/28/20 : 199 lb (90.3 kg)  01/24/20 : 199 lb (90.3 kg)  01/10/20 : 199 lb (90.3 kg) labs, referral to physical therapy if joint symptoms persist; probably would be best to do physical therapy in concert with her post-op recovery if surgery is done soon.  - RELAFEN DS 1000 MG Oral Tab; Take 1 tablet by mouth 2 (two) times a day.   Dispense: as Consulting Physician ([address] [place] [address])  Charu Villegas PT as Physical Therapist (Physical Therapy)  The patient indicates understanding of these issues and agrees to the plan.   The patient is asked to return to clinic in 2-3 months with Dr. Viry Mckinnon MD

## 2020-05-29 ENCOUNTER — TELEPHONE (OUTPATIENT)
Dept: INTERNAL MEDICINE CLINIC | Facility: CLINIC | Age: 65
End: 2020-05-29

## 2020-05-29 ENCOUNTER — LAB ENCOUNTER (OUTPATIENT)
Dept: LAB | Age: 65
End: 2020-05-29
Attending: INTERNAL MEDICINE
Payer: MEDICARE

## 2020-05-29 DIAGNOSIS — R53.83 FATIGUE, UNSPECIFIED TYPE: ICD-10-CM

## 2020-05-29 DIAGNOSIS — M15.9 PRIMARY OSTEOARTHRITIS INVOLVING MULTIPLE JOINTS: ICD-10-CM

## 2020-05-29 DIAGNOSIS — Z96.651 CHRONIC KNEE PAIN AFTER TOTAL REPLACEMENT OF RIGHT KNEE JOINT: ICD-10-CM

## 2020-05-29 DIAGNOSIS — R73.01 ELEVATED FASTING GLUCOSE: ICD-10-CM

## 2020-05-29 DIAGNOSIS — G89.29 CHRONIC KNEE PAIN AFTER TOTAL REPLACEMENT OF RIGHT KNEE JOINT: ICD-10-CM

## 2020-05-29 DIAGNOSIS — M54.50 ACUTE RIGHT-SIDED LOW BACK PAIN WITHOUT SCIATICA: ICD-10-CM

## 2020-05-29 DIAGNOSIS — M54.9 COSTOVERTEBRAL ANGLE PAIN: Primary | ICD-10-CM

## 2020-05-29 DIAGNOSIS — R10.9 FLANK PAIN: ICD-10-CM

## 2020-05-29 DIAGNOSIS — M25.561 CHRONIC KNEE PAIN AFTER TOTAL REPLACEMENT OF RIGHT KNEE JOINT: ICD-10-CM

## 2020-05-29 PROCEDURE — 80053 COMPREHEN METABOLIC PANEL: CPT

## 2020-05-29 PROCEDURE — 84481 FREE ASSAY (FT-3): CPT

## 2020-05-29 PROCEDURE — 36415 COLL VENOUS BLD VENIPUNCTURE: CPT

## 2020-05-29 PROCEDURE — 84443 ASSAY THYROID STIM HORMONE: CPT

## 2020-05-29 PROCEDURE — 84439 ASSAY OF FREE THYROXINE: CPT

## 2020-05-29 PROCEDURE — 85025 COMPLETE CBC W/AUTO DIFF WBC: CPT

## 2020-05-29 PROCEDURE — 83036 HEMOGLOBIN GLYCOSYLATED A1C: CPT

## 2020-05-29 NOTE — TELEPHONE ENCOUNTER
Patient called and stated that she went to go an  her prescription for RELAFEN DS 1000 MG Oral Tab. She stated that the insurance will not cover the medication. [facility] #[id number] - [place], [address] AT [place] [address]

## 2020-05-29 NOTE — TELEPHONE ENCOUNTER
Patient called and stated that she went to go an have her labs done today and the order for a urinalysis wasn't placed. Please advise.

## 2020-05-31 DIAGNOSIS — E89.0 POSTOPERATIVE HYPOTHYROIDISM: Primary | ICD-10-CM

## 2020-05-31 DIAGNOSIS — R79.89 ABNORMAL THYROID BLOOD TEST: ICD-10-CM

## 2020-06-01 RX ORDER — NABUMETONE 1000 MG/1
1 TABLET ORAL 2 TIMES DAILY
Qty: 60 TABLET | Refills: 2 | Status: SHIPPED | OUTPATIENT
Start: 2020-06-01 | End: 2020-07-01

## 2020-06-01 NOTE — TELEPHONE ENCOUNTER
Lennox Riojas entered the order for there - however [facility] [facility] - not sure if they can ship prescription to patient?   I doubt she would want to drive all the way to [facility] just to  the p

## 2020-06-01 NOTE — TELEPHONE ENCOUNTER
Pt informed. Contact information given for the pharmacy. Pt will check on shipping and any out of pocket expense.

## 2020-06-01 NOTE — TELEPHONE ENCOUNTER
There is a pharmacy somewhere [facility] [facility]  that has discount pricing for the Relafen. I believe Dr. Lois Jolly has the information on his desk. If we could get the info for that pharmacy I could sent the Relafen prescription there.

## 2020-06-02 ENCOUNTER — TELEPHONE (OUTPATIENT)
Dept: INTERNAL MEDICINE CLINIC | Facility: CLINIC | Age: 65
End: 2020-06-02

## 2020-06-02 NOTE — TELEPHONE ENCOUNTER
Relafen DS not covered would you like us to start a PA     Phone# [phone]  Patient ID [id number]

## 2020-06-08 ENCOUNTER — TELEPHONE (OUTPATIENT)
Dept: INTERNAL MEDICINE CLINIC | Facility: CLINIC | Age: 65
End: 2020-06-08

## 2020-06-08 NOTE — TELEPHONE ENCOUNTER
Faxed labs to Dr. Riana Pickett per patient request  Fx# [phone]    Requesting call back from the nurse to further discuss labs.  Zimbabwean speaking

## 2020-06-08 NOTE — TELEPHONE ENCOUNTER
Spoke to pt and answered all questions. Pt agreeable to f/u w endo that has seen for thyroid in the past.   Pt was also informed fax results were sent to endo, per previous message.

## 2020-06-08 NOTE — TELEPHONE ENCOUNTER
Prior authorization for RELAFEN DS 1000 MG TABLET has been APPROVED from 1/1/2020 until 6/5/2021. Patient aware. Confirmation placed in Dr. Tommy Almanzar in-box.

## 2020-06-08 NOTE — TELEPHONE ENCOUNTER
Pt wanting to discuss test results as she does not understand how to use MyChart. Per Dr. Moon Gutierrez message pt to f/u w/ endocrinologist d/t abnormal thyroid testing.  Attempted to provide pt w/ [facility] endocrinology group but pt stated she already has an endocri

## 2020-06-11 ENCOUNTER — TELEPHONE (OUTPATIENT)
Dept: ORTHOPEDICS CLINIC | Facility: CLINIC | Age: 65
End: 2020-06-11

## 2020-06-11 NOTE — TELEPHONE ENCOUNTER
Spoke with patient - will plan to schedule surgery for Tuesday July 7 (offered Tuesday June 23 but patient will be out of town for the next two weeks helping a family member in [place]).  Things needed to do prior to then:  -obtain preoperative labs - advis

## 2020-06-11 NOTE — TELEPHONE ENCOUNTER
Pharmacy would like a call back says that even with the PA medication still comes out to be $1000.00 so suggested to do another PA for 500 MG instead of the 1000 MG and it would come out cheaper

## 2020-06-12 ENCOUNTER — TELEPHONE (OUTPATIENT)
Dept: ORTHOPEDICS CLINIC | Facility: CLINIC | Age: 65
End: 2020-06-12

## 2020-06-12 DIAGNOSIS — Z96.651 PAIN DUE TO TOTAL RIGHT KNEE REPLACEMENT, SUBSEQUENT ENCOUNTER: Primary | ICD-10-CM

## 2020-06-12 DIAGNOSIS — T84.84XD PAIN DUE TO TOTAL RIGHT KNEE REPLACEMENT, SUBSEQUENT ENCOUNTER: Primary | ICD-10-CM

## 2020-06-12 RX ORDER — NABUMETONE 500 MG/1
1000 TABLET, FILM COATED ORAL 2 TIMES DAILY
Qty: 120 TABLET | Refills: 2 | Status: ON HOLD | OUTPATIENT
Start: 2020-06-12 | End: 2020-07-10

## 2020-06-12 RX ORDER — NABUMETONE 500 MG/1
1000 TABLET, FILM COATED ORAL 2 TIMES DAILY
Qty: 120 TABLET | Refills: 2 | Status: CANCELLED | OUTPATIENT
Start: 2020-06-12

## 2020-06-12 NOTE — TELEPHONE ENCOUNTER
----- Message from Jimmy Huang MD sent at 6/12/2020  7:05 AM CDT -----  Can you begin working on scheduling this patient for revision right knee replacement on Tues July 7?

## 2020-06-12 NOTE — TELEPHONE ENCOUNTER
Confirmed with Jennifer Milton RN that patient stated it was still too pricey with [facility]. Dr. Mitch Trujillo please advise if you would like to switch to 500mg and try PA or something else.

## 2020-06-12 NOTE — TELEPHONE ENCOUNTER
We sent to [facility] and patient notified on 6/1/2020. Unsure if [facility] was calling or [facility]. Notified [facility] to cancel Rx.     Param Henry will contact patient to see if she received from [facility]

## 2020-06-30 ENCOUNTER — APPOINTMENT (OUTPATIENT)
Dept: LAB | Age: 65
End: 2020-06-30
Attending: PHYSICIAN ASSISTANT
Payer: MEDICARE

## 2020-06-30 ENCOUNTER — TELEPHONE (OUTPATIENT)
Dept: INTERNAL MEDICINE CLINIC | Facility: CLINIC | Age: 65
End: 2020-06-30

## 2020-06-30 ENCOUNTER — OFFICE VISIT (OUTPATIENT)
Dept: INTERNAL MEDICINE CLINIC | Facility: CLINIC | Age: 65
End: 2020-06-30
Payer: MEDICARE

## 2020-06-30 VITALS
RESPIRATION RATE: 12 BRPM | OXYGEN SATURATION: 98 % | HEIGHT: 64.75 IN | DIASTOLIC BLOOD PRESSURE: 72 MMHG | WEIGHT: 203 LBS | SYSTOLIC BLOOD PRESSURE: 120 MMHG | BODY MASS INDEX: 34.24 KG/M2 | TEMPERATURE: 98 F | HEART RATE: 78 BPM

## 2020-06-30 DIAGNOSIS — Z86.73 HISTORY OF ARTERIAL ISCHEMIC STROKE: ICD-10-CM

## 2020-06-30 DIAGNOSIS — Z01.818 PREOP EXAMINATION: Primary | ICD-10-CM

## 2020-06-30 DIAGNOSIS — E89.0 POSTOPERATIVE HYPOTHYROIDISM: ICD-10-CM

## 2020-06-30 DIAGNOSIS — I10 ESSENTIAL HYPERTENSION: ICD-10-CM

## 2020-06-30 DIAGNOSIS — Z01.818 PREOP EXAMINATION: ICD-10-CM

## 2020-06-30 LAB
APTT PPP: 35.4 SECONDS (ref 25.4–36.1)
INR BLD: 0.95 (ref 0.89–1.11)
PSA SERPL DL<=0.01 NG/ML-MCNC: 13 SECONDS (ref 12.4–14.6)

## 2020-06-30 PROCEDURE — 87081 CULTURE SCREEN ONLY: CPT

## 2020-06-30 PROCEDURE — 99214 OFFICE O/P EST MOD 30 MIN: CPT | Performed by: PHYSICIAN ASSISTANT

## 2020-06-30 PROCEDURE — 93000 ELECTROCARDIOGRAM COMPLETE: CPT | Performed by: PHYSICIAN ASSISTANT

## 2020-06-30 PROCEDURE — 36415 COLL VENOUS BLD VENIPUNCTURE: CPT

## 2020-06-30 PROCEDURE — 85730 THROMBOPLASTIN TIME PARTIAL: CPT

## 2020-06-30 PROCEDURE — 85610 PROTHROMBIN TIME: CPT

## 2020-06-30 NOTE — TELEPHONE ENCOUNTER
Faxed EKG report to Misbah Quesada ([facility] #[phone]). [facility] and copy placed in [employer] [facility] [address].

## 2020-06-30 NOTE — PROGRESS NOTES
Dena Browne is a 72year old female who presents for a Preop physical exam.   HPI:   Pt presents for preop H&P.   Request for medical clearance is made by Dr. Corbin Mcardle for perioperative risk assessment due to patient's multiple medical issues as outline *CURRENTLY HOLDING NSAIDs     Past Medical History:   Diagnosis Date   • Anesthesia complication    • Cancer ([address]     thyroid CA 10 yrs ago   • Esophageal reflux    • High blood pressure    • High cholesterol    • Kidney stone    • Normal delivery     x chest pain, palpitations, MADRID, orthopnea  GI: denies abdominal pain, nausea, vomiting, diarrhea, constipation  : denies dysuria  MUSCULOSKELETAL: denies arthalgias  NEURO: denies headaches, numbness, tingling  HEMATOLOGIC: denies bruising or bleeding  Al focus on RF modification and start ASA 81 mg after TKA. # GERD: stable, takes PPI PRN. # LE swelling: takes Lasix PRN (generally not more than a couple times a week). # Obesity: counseled on lifestyle changes. # JUANCHO: uses xanax prn.   # R knee pain: pro

## 2020-07-01 ENCOUNTER — MED REC SCAN ONLY (OUTPATIENT)
Dept: ORTHOPEDICS CLINIC | Facility: CLINIC | Age: 65
End: 2020-07-01

## 2020-07-04 ENCOUNTER — LAB ENCOUNTER (OUTPATIENT)
Dept: LAB | Facility: HOSPITAL | Age: 65
DRG: 467 | End: 2020-07-04
Attending: ORTHOPAEDIC SURGERY
Payer: MEDICARE

## 2020-07-04 DIAGNOSIS — M15.9 PRIMARY OSTEOARTHRITIS INVOLVING MULTIPLE JOINTS: ICD-10-CM

## 2020-07-05 LAB — SARS-COV-2 RNA RESP QL NAA+PROBE: NOT DETECTED

## 2020-07-06 ENCOUNTER — ANESTHESIA EVENT (OUTPATIENT)
Dept: SURGERY | Facility: HOSPITAL | Age: 65
DRG: 467 | End: 2020-07-06
Payer: MEDICARE

## 2020-07-07 ENCOUNTER — ANESTHESIA (OUTPATIENT)
Dept: SURGERY | Facility: HOSPITAL | Age: 65
DRG: 467 | End: 2020-07-07
Payer: MEDICARE

## 2020-07-07 ENCOUNTER — HOSPITAL ENCOUNTER (INPATIENT)
Facility: HOSPITAL | Age: 65
LOS: 3 days | Discharge: HOME HEALTH | DRG: 467 | End: 2020-07-10
Attending: ORTHOPAEDIC SURGERY | Admitting: ORTHOPAEDIC SURGERY
Payer: MEDICARE

## 2020-07-07 ENCOUNTER — APPOINTMENT (OUTPATIENT)
Dept: GENERAL RADIOLOGY | Facility: HOSPITAL | Age: 65
DRG: 467 | End: 2020-07-07
Attending: NURSE PRACTITIONER
Payer: MEDICARE

## 2020-07-07 DIAGNOSIS — Z96.651 PAIN DUE TO TOTAL RIGHT KNEE REPLACEMENT, SUBSEQUENT ENCOUNTER: ICD-10-CM

## 2020-07-07 DIAGNOSIS — T84.84XD PAIN DUE TO TOTAL RIGHT KNEE REPLACEMENT, SUBSEQUENT ENCOUNTER: ICD-10-CM

## 2020-07-07 DIAGNOSIS — M15.9 PRIMARY OSTEOARTHRITIS INVOLVING MULTIPLE JOINTS: Primary | ICD-10-CM

## 2020-07-07 DIAGNOSIS — D64.9 ANEMIA, UNSPECIFIED TYPE: ICD-10-CM

## 2020-07-07 PROBLEM — Z96.659: Status: ACTIVE | Noted: 2020-07-07

## 2020-07-07 PROBLEM — T84.018A: Status: ACTIVE | Noted: 2020-07-07

## 2020-07-07 PROBLEM — Z96.659 FAILED TOTAL KNEE ARTHROPLASTY, INITIAL ENCOUNTER: Status: ACTIVE | Noted: 2020-07-07

## 2020-07-07 PROBLEM — T84.018A FAILED TOTAL KNEE ARTHROPLASTY, INITIAL ENCOUNTER: Status: ACTIVE | Noted: 2020-07-07

## 2020-07-07 PROBLEM — T84.018A FAILED TOTAL KNEE ARTHROPLASTY, INITIAL ENCOUNTER (HCC): Status: ACTIVE | Noted: 2020-07-07

## 2020-07-07 PROBLEM — Z96.659 FAILED TOTAL KNEE ARTHROPLASTY, INITIAL ENCOUNTER (HCC): Status: ACTIVE | Noted: 2020-07-07

## 2020-07-07 LAB
ANTIBODY SCREEN: NEGATIVE
RH BLOOD TYPE: POSITIVE

## 2020-07-07 PROCEDURE — 3E0T3BZ INTRODUCTION OF ANESTHETIC AGENT INTO PERIPHERAL NERVES AND PLEXI, PERCUTANEOUS APPROACH: ICD-10-PCS | Performed by: ANESTHESIOLOGY

## 2020-07-07 PROCEDURE — 0SPC0JZ REMOVAL OF SYNTHETIC SUBSTITUTE FROM RIGHT KNEE JOINT, OPEN APPROACH: ICD-10-PCS | Performed by: ORTHOPAEDIC SURGERY

## 2020-07-07 PROCEDURE — 73564 X-RAY EXAM KNEE 4 OR MORE: CPT | Performed by: NURSE PRACTITIONER

## 2020-07-07 PROCEDURE — 0SRC0J9 REPLACEMENT OF RIGHT KNEE JOINT WITH SYNTHETIC SUBSTITUTE, CEMENTED, OPEN APPROACH: ICD-10-PCS | Performed by: ORTHOPAEDIC SURGERY

## 2020-07-07 PROCEDURE — 27487 REVISE/REPLACE KNEE JOINT: CPT | Performed by: ORTHOPAEDIC SURGERY

## 2020-07-07 PROCEDURE — 76942 ECHO GUIDE FOR BIOPSY: CPT | Performed by: ANESTHESIOLOGY

## 2020-07-07 PROCEDURE — 27487 REVISE/REPLACE KNEE JOINT: CPT | Performed by: NURSE PRACTITIONER

## 2020-07-07 PROCEDURE — 99222 1ST HOSP IP/OBS MODERATE 55: CPT | Performed by: HOSPITALIST

## 2020-07-07 DEVICE — REFOBACIN BC R 1X40 US: Type: IMPLANTABLE DEVICE | Site: KNEE | Status: FUNCTIONAL

## 2020-07-07 RX ORDER — OXYCODONE HYDROCHLORIDE 5 MG/1
5 TABLET ORAL EVERY 4 HOURS PRN
Status: DISCONTINUED | OUTPATIENT
Start: 2020-07-07 | End: 2020-07-08

## 2020-07-07 RX ORDER — ACETAMINOPHEN 500 MG
1000 TABLET ORAL ONCE
Status: DISCONTINUED | OUTPATIENT
Start: 2020-07-07 | End: 2020-07-07 | Stop reason: HOSPADM

## 2020-07-07 RX ORDER — ONDANSETRON 2 MG/ML
4 INJECTION INTRAMUSCULAR; INTRAVENOUS AS NEEDED
Status: DISCONTINUED | OUTPATIENT
Start: 2020-07-07 | End: 2020-07-07 | Stop reason: HOSPADM

## 2020-07-07 RX ORDER — ENOXAPARIN SODIUM 100 MG/ML
40 INJECTION SUBCUTANEOUS DAILY
Status: DISCONTINUED | OUTPATIENT
Start: 2020-07-08 | End: 2020-07-10

## 2020-07-07 RX ORDER — HYDROMORPHONE HYDROCHLORIDE 1 MG/ML
0.2 INJECTION, SOLUTION INTRAMUSCULAR; INTRAVENOUS; SUBCUTANEOUS EVERY 2 HOUR PRN
Status: ACTIVE | OUTPATIENT
Start: 2020-07-07 | End: 2020-07-09

## 2020-07-07 RX ORDER — ONDANSETRON 2 MG/ML
INJECTION INTRAMUSCULAR; INTRAVENOUS AS NEEDED
Status: DISCONTINUED | OUTPATIENT
Start: 2020-07-07 | End: 2020-07-07 | Stop reason: SURG

## 2020-07-07 RX ORDER — LEVOTHYROXINE SODIUM 0.15 MG/1
150 TABLET ORAL
COMMUNITY
End: 2020-08-03

## 2020-07-07 RX ORDER — DIPHENHYDRAMINE HCL 25 MG
25 CAPSULE ORAL EVERY 4 HOURS PRN
Status: DISCONTINUED | OUTPATIENT
Start: 2020-07-07 | End: 2020-07-10

## 2020-07-07 RX ORDER — LOSARTAN POTASSIUM 100 MG/1
100 TABLET ORAL DAILY
COMMUNITY
End: 2020-10-07

## 2020-07-07 RX ORDER — DOCUSATE SODIUM 100 MG/1
100 CAPSULE, LIQUID FILLED ORAL 2 TIMES DAILY
Status: DISCONTINUED | OUTPATIENT
Start: 2020-07-07 | End: 2020-07-10

## 2020-07-07 RX ORDER — SODIUM CHLORIDE, SODIUM LACTATE, POTASSIUM CHLORIDE, CALCIUM CHLORIDE 600; 310; 30; 20 MG/100ML; MG/100ML; MG/100ML; MG/100ML
INJECTION, SOLUTION INTRAVENOUS CONTINUOUS
Status: DISCONTINUED | OUTPATIENT
Start: 2020-07-07 | End: 2020-07-07 | Stop reason: HOSPADM

## 2020-07-07 RX ORDER — SODIUM CHLORIDE, SODIUM LACTATE, POTASSIUM CHLORIDE, CALCIUM CHLORIDE 600; 310; 30; 20 MG/100ML; MG/100ML; MG/100ML; MG/100ML
INJECTION, SOLUTION INTRAVENOUS CONTINUOUS
Status: DISCONTINUED | OUTPATIENT
Start: 2020-07-07 | End: 2020-07-10

## 2020-07-07 RX ORDER — HYDRALAZINE HYDROCHLORIDE 20 MG/ML
INJECTION INTRAMUSCULAR; INTRAVENOUS AS NEEDED
Status: DISCONTINUED | OUTPATIENT
Start: 2020-07-07 | End: 2020-07-07 | Stop reason: SURG

## 2020-07-07 RX ORDER — CALCITRIOL 0.25 UG/1
0.5 CAPSULE, LIQUID FILLED ORAL DAILY
Status: DISCONTINUED | OUTPATIENT
Start: 2020-07-07 | End: 2020-07-10

## 2020-07-07 RX ORDER — MIDAZOLAM HYDROCHLORIDE 1 MG/ML
INJECTION INTRAMUSCULAR; INTRAVENOUS AS NEEDED
Status: DISCONTINUED | OUTPATIENT
Start: 2020-07-07 | End: 2020-07-07 | Stop reason: SURG

## 2020-07-07 RX ORDER — CALCITRIOL 0.5 UG/1
0.5 CAPSULE, LIQUID FILLED ORAL DAILY
COMMUNITY
End: 2020-11-09

## 2020-07-07 RX ORDER — ACETAMINOPHEN 325 MG/1
650 TABLET ORAL ONCE
Status: COMPLETED | OUTPATIENT
Start: 2020-07-07 | End: 2020-07-07

## 2020-07-07 RX ORDER — KETAMINE HYDROCHLORIDE 50 MG/ML
INJECTION, SOLUTION, CONCENTRATE INTRAMUSCULAR; INTRAVENOUS AS NEEDED
Status: DISCONTINUED | OUTPATIENT
Start: 2020-07-07 | End: 2020-07-07 | Stop reason: SURG

## 2020-07-07 RX ORDER — ACETAMINOPHEN 325 MG/1
TABLET ORAL
Status: COMPLETED
Start: 2020-07-07 | End: 2020-07-07

## 2020-07-07 RX ORDER — HYDROMORPHONE HYDROCHLORIDE 1 MG/ML
0.8 INJECTION, SOLUTION INTRAMUSCULAR; INTRAVENOUS; SUBCUTANEOUS EVERY 2 HOUR PRN
Status: DISCONTINUED | OUTPATIENT
Start: 2020-07-07 | End: 2020-07-08

## 2020-07-07 RX ORDER — LEVOTHYROXINE SODIUM 0.15 MG/1
150 TABLET ORAL
Status: DISCONTINUED | OUTPATIENT
Start: 2020-07-08 | End: 2020-07-10

## 2020-07-07 RX ORDER — CEFAZOLIN SODIUM/WATER 2 G/20 ML
2 SYRINGE (ML) INTRAVENOUS EVERY 8 HOURS
Status: COMPLETED | OUTPATIENT
Start: 2020-07-08 | End: 2020-07-08

## 2020-07-07 RX ORDER — MIDAZOLAM HYDROCHLORIDE 1 MG/ML
1 INJECTION INTRAMUSCULAR; INTRAVENOUS EVERY 5 MIN PRN
Status: DISCONTINUED | OUTPATIENT
Start: 2020-07-07 | End: 2020-07-07 | Stop reason: HOSPADM

## 2020-07-07 RX ORDER — HYDROMORPHONE HYDROCHLORIDE 1 MG/ML
0.4 INJECTION, SOLUTION INTRAMUSCULAR; INTRAVENOUS; SUBCUTANEOUS EVERY 2 HOUR PRN
Status: ACTIVE | OUTPATIENT
Start: 2020-07-07 | End: 2020-07-09

## 2020-07-07 RX ORDER — ACETAMINOPHEN 500 MG
1000 TABLET ORAL 4 TIMES DAILY
Status: DISCONTINUED | OUTPATIENT
Start: 2020-07-07 | End: 2020-07-10

## 2020-07-07 RX ORDER — POLYETHYLENE GLYCOL 3350 17 G/17G
17 POWDER, FOR SOLUTION ORAL DAILY PRN
Status: DISCONTINUED | OUTPATIENT
Start: 2020-07-07 | End: 2020-07-10

## 2020-07-07 RX ORDER — OXYCODONE HYDROCHLORIDE 15 MG/1
15 TABLET ORAL EVERY 4 HOURS PRN
Status: DISCONTINUED | OUTPATIENT
Start: 2020-07-07 | End: 2020-07-08

## 2020-07-07 RX ORDER — TIZANIDINE 2 MG/1
2 TABLET ORAL 3 TIMES DAILY PRN
Status: DISCONTINUED | OUTPATIENT
Start: 2020-07-07 | End: 2020-07-08

## 2020-07-07 RX ORDER — PROCHLORPERAZINE EDISYLATE 5 MG/ML
10 INJECTION INTRAMUSCULAR; INTRAVENOUS EVERY 6 HOURS PRN
Status: ACTIVE | OUTPATIENT
Start: 2020-07-07 | End: 2020-07-09

## 2020-07-07 RX ORDER — ALPRAZOLAM 0.25 MG/1
0.25 TABLET ORAL 3 TIMES DAILY PRN
Status: DISCONTINUED | OUTPATIENT
Start: 2020-07-07 | End: 2020-07-10

## 2020-07-07 RX ORDER — DEXAMETHASONE SODIUM PHOSPHATE 4 MG/ML
VIAL (ML) INJECTION AS NEEDED
Status: DISCONTINUED | OUTPATIENT
Start: 2020-07-07 | End: 2020-07-07 | Stop reason: SURG

## 2020-07-07 RX ORDER — DIPHENHYDRAMINE HYDROCHLORIDE 50 MG/ML
12.5 INJECTION INTRAMUSCULAR; INTRAVENOUS EVERY 4 HOURS PRN
Status: DISCONTINUED | OUTPATIENT
Start: 2020-07-07 | End: 2020-07-10

## 2020-07-07 RX ORDER — BUPIVACAINE HYDROCHLORIDE 2.5 MG/ML
INJECTION, SOLUTION EPIDURAL; INFILTRATION; INTRACAUDAL AS NEEDED
Status: DISCONTINUED | OUTPATIENT
Start: 2020-07-07 | End: 2020-07-07 | Stop reason: SURG

## 2020-07-07 RX ORDER — TRANEXAMIC ACID 10 MG/ML
1000 INJECTION, SOLUTION INTRAVENOUS ONCE
Status: COMPLETED | OUTPATIENT
Start: 2020-07-07 | End: 2020-07-07

## 2020-07-07 RX ORDER — DIPHENHYDRAMINE HYDROCHLORIDE 50 MG/ML
25 INJECTION INTRAMUSCULAR; INTRAVENOUS ONCE AS NEEDED
Status: ACTIVE | OUTPATIENT
Start: 2020-07-07 | End: 2020-07-07

## 2020-07-07 RX ORDER — METOCLOPRAMIDE HYDROCHLORIDE 5 MG/ML
10 INJECTION INTRAMUSCULAR; INTRAVENOUS EVERY 6 HOURS PRN
Status: ACTIVE | OUTPATIENT
Start: 2020-07-07 | End: 2020-07-09

## 2020-07-07 RX ORDER — SODIUM PHOSPHATE, DIBASIC AND SODIUM PHOSPHATE, MONOBASIC 7; 19 G/133ML; G/133ML
1 ENEMA RECTAL ONCE AS NEEDED
Status: DISCONTINUED | OUTPATIENT
Start: 2020-07-07 | End: 2020-07-10

## 2020-07-07 RX ORDER — OXYCODONE HYDROCHLORIDE 10 MG/1
10 TABLET ORAL EVERY 4 HOURS PRN
Status: DISCONTINUED | OUTPATIENT
Start: 2020-07-07 | End: 2020-07-08

## 2020-07-07 RX ORDER — ONDANSETRON 2 MG/ML
4 INJECTION INTRAMUSCULAR; INTRAVENOUS EVERY 4 HOURS PRN
Status: DISPENSED | OUTPATIENT
Start: 2020-07-07 | End: 2020-07-09

## 2020-07-07 RX ORDER — KETOROLAC TROMETHAMINE 30 MG/ML
30 INJECTION, SOLUTION INTRAMUSCULAR; INTRAVENOUS EVERY 6 HOURS
Status: COMPLETED | OUTPATIENT
Start: 2020-07-07 | End: 2020-07-08

## 2020-07-07 RX ORDER — LABETALOL HYDROCHLORIDE 5 MG/ML
5 INJECTION, SOLUTION INTRAVENOUS EVERY 5 MIN PRN
Status: DISCONTINUED | OUTPATIENT
Start: 2020-07-07 | End: 2020-07-07 | Stop reason: HOSPADM

## 2020-07-07 RX ORDER — HYDROMORPHONE HYDROCHLORIDE 1 MG/ML
0.4 INJECTION, SOLUTION INTRAMUSCULAR; INTRAVENOUS; SUBCUTANEOUS EVERY 5 MIN PRN
Status: DISCONTINUED | OUTPATIENT
Start: 2020-07-07 | End: 2020-07-07 | Stop reason: HOSPADM

## 2020-07-07 RX ORDER — CEFAZOLIN SODIUM/WATER 2 G/20 ML
2 SYRINGE (ML) INTRAVENOUS ONCE
Status: COMPLETED | OUTPATIENT
Start: 2020-07-07 | End: 2020-07-07

## 2020-07-07 RX ORDER — DEXAMETHASONE SODIUM PHOSPHATE 10 MG/ML
8 INJECTION, SOLUTION INTRAMUSCULAR; INTRAVENOUS ONCE
Status: COMPLETED | OUTPATIENT
Start: 2020-07-08 | End: 2020-07-08

## 2020-07-07 RX ORDER — MELATONIN
325
Status: DISCONTINUED | OUTPATIENT
Start: 2020-07-08 | End: 2020-07-10

## 2020-07-07 RX ORDER — SENNOSIDES 8.6 MG
17.2 TABLET ORAL NIGHTLY
Status: DISCONTINUED | OUTPATIENT
Start: 2020-07-07 | End: 2020-07-10

## 2020-07-07 RX ORDER — DEXAMETHASONE SODIUM PHOSPHATE 10 MG/ML
INJECTION, SOLUTION INTRAMUSCULAR; INTRAVENOUS AS NEEDED
Status: DISCONTINUED | OUTPATIENT
Start: 2020-07-07 | End: 2020-07-07 | Stop reason: SURG

## 2020-07-07 RX ORDER — MEPERIDINE HYDROCHLORIDE 25 MG/ML
12.5 INJECTION INTRAMUSCULAR; INTRAVENOUS; SUBCUTANEOUS AS NEEDED
Status: DISCONTINUED | OUTPATIENT
Start: 2020-07-07 | End: 2020-07-07 | Stop reason: HOSPADM

## 2020-07-07 RX ORDER — NALOXONE HYDROCHLORIDE 0.4 MG/ML
80 INJECTION, SOLUTION INTRAMUSCULAR; INTRAVENOUS; SUBCUTANEOUS AS NEEDED
Status: DISCONTINUED | OUTPATIENT
Start: 2020-07-07 | End: 2020-07-07 | Stop reason: HOSPADM

## 2020-07-07 RX ORDER — SCOLOPAMINE TRANSDERMAL SYSTEM 1 MG/1
1 PATCH, EXTENDED RELEASE TRANSDERMAL ONCE
Status: DISCONTINUED | OUTPATIENT
Start: 2020-07-07 | End: 2020-07-07

## 2020-07-07 RX ORDER — LOSARTAN POTASSIUM 100 MG/1
100 TABLET ORAL DAILY
Status: DISCONTINUED | OUTPATIENT
Start: 2020-07-08 | End: 2020-07-09

## 2020-07-07 RX ORDER — BISACODYL 10 MG
10 SUPPOSITORY, RECTAL RECTAL
Status: DISCONTINUED | OUTPATIENT
Start: 2020-07-07 | End: 2020-07-10

## 2020-07-07 RX ORDER — PANTOPRAZOLE SODIUM 40 MG/1
40 TABLET, DELAYED RELEASE ORAL
Status: DISCONTINUED | OUTPATIENT
Start: 2020-07-08 | End: 2020-07-10

## 2020-07-07 RX ADMIN — DEXAMETHASONE SODIUM PHOSPHATE 4 MG: 4 MG/ML VIAL (ML) INJECTION at 12:39:00

## 2020-07-07 RX ADMIN — MIDAZOLAM HYDROCHLORIDE 2 MG: 1 INJECTION INTRAMUSCULAR; INTRAVENOUS at 13:27:00

## 2020-07-07 RX ADMIN — CEFAZOLIN SODIUM/WATER 2 G: 2 G/20 ML SYRINGE (ML) INTRAVENOUS at 12:39:00

## 2020-07-07 RX ADMIN — SODIUM CHLORIDE, SODIUM LACTATE, POTASSIUM CHLORIDE, CALCIUM CHLORIDE: 600; 310; 30; 20 INJECTION, SOLUTION INTRAVENOUS at 13:47:00

## 2020-07-07 RX ADMIN — ONDANSETRON 4 MG: 2 INJECTION INTRAMUSCULAR; INTRAVENOUS at 12:39:00

## 2020-07-07 RX ADMIN — HYDRALAZINE HYDROCHLORIDE 20 MG: 20 INJECTION INTRAMUSCULAR; INTRAVENOUS at 17:18:00

## 2020-07-07 RX ADMIN — TRANEXAMIC ACID 1000 MG: 10 INJECTION, SOLUTION INTRAVENOUS at 12:52:00

## 2020-07-07 RX ADMIN — KETAMINE HYDROCHLORIDE 25 MG: 50 INJECTION, SOLUTION, CONCENTRATE INTRAMUSCULAR; INTRAVENOUS at 12:36:00

## 2020-07-07 RX ADMIN — CEFAZOLIN SODIUM/WATER 2 G: 2 G/20 ML SYRINGE (ML) INTRAVENOUS at 16:36:00

## 2020-07-07 RX ADMIN — BUPIVACAINE HYDROCHLORIDE 40 ML: 2.5 INJECTION, SOLUTION EPIDURAL; INFILTRATION; INTRACAUDAL at 12:33:00

## 2020-07-07 RX ADMIN — SODIUM CHLORIDE, SODIUM LACTATE, POTASSIUM CHLORIDE, CALCIUM CHLORIDE: 600; 310; 30; 20 INJECTION, SOLUTION INTRAVENOUS at 16:08:00

## 2020-07-07 RX ADMIN — DEXAMETHASONE SODIUM PHOSPHATE 4 MG: 10 INJECTION, SOLUTION INTRAMUSCULAR; INTRAVENOUS at 12:33:00

## 2020-07-07 RX ADMIN — MIDAZOLAM HYDROCHLORIDE 2 MG: 1 INJECTION INTRAMUSCULAR; INTRAVENOUS at 12:20:00

## 2020-07-07 NOTE — ANESTHESIA PROCEDURE NOTES
Spinal Block  Performed by: Rossy Ivey CRNA  Authorized by: Shaylee Marie MD       General Information and Staff    Start Time:   Anesthesiologist: Shaylee Marie MD  CRNA:  Rossy Ivey CRNA  Performed by:  CRNA  Site identification: surface landmarks

## 2020-07-07 NOTE — H&P
[facility] H&P     CC: Patient presents with:  Consult: right knee pain most recently since 3/22/19. R TKA  and CONNIE 10 months ago with Dr. Mariluz Hendrix @ [facility].  limited ROM. pain goes down into lower leg and up into the thigh.   knee feels unstab ago   • Esophageal reflux     • Kidney stone     • Normal delivery       x3   • Other screening mammogram 2/07/2012   • Other screening mammogram 1/17/2011   • Pain in joint, ankle and foot 9/14/2011   • Personal history of malignant neoplasm of thyroid 11 Release Take 1 capsule (40 mg total) by mouth every morning before breakfast. 90 capsule 1      No Known Allergies        Family History   Problem Relation Age of Onset   • Heart Disease Father     • Heart Disease Mother        Social History    Occupation knee cruciate retaining with joint line approximately 15 to 20 mm above the fibular head, femoral component and 7 degrees valgus and appears in appropriate position. Tibial component is in 8 degrees varus, however the AP view is slightly rotated.   Also no given increased exposure needed as well as increased length of surgery. We did discuss expectations for mac-and postoperative time course.   I additionally discussed that 1 of the biggest risks for revision for stiffness is recurrence of stiffness, and po

## 2020-07-07 NOTE — ANESTHESIA PROCEDURE NOTES
Regional Block  Performed by: Renata Russo CRNA  Authorized by: Laquita Bettencourt MD       General Information and Staff    Start Time:   Anesthesiologist: Laquita Bettencourt MD  CRNA:  Renata Russo CRNA  Performed by:  CRNA  Patient Location:  OR      Site Identif

## 2020-07-07 NOTE — ANESTHESIA POSTPROCEDURE EVALUATION
[facility] Patient Status:  Inpatient   Age/Gender 72year old female MRN [id number]   [facility] SURGERY Attending Joshua Tellse, [address] [facility] [address] Day # 0 PCP Carolina Edwards MD       Anesthesia Post-op Note    Procedure(s):  ri

## 2020-07-07 NOTE — ANESTHESIA PREPROCEDURE EVALUATION
PRE-OP EVALUATION    Patient Name: Gloria Albert    Pre-op Diagnosis: Pain due to total right knee replacement, subsequent encounter [T84.84XD, B17.547]    Procedure(s):  right total knee revision    Surgeon(s) and Role:     Anil Pickett MD - Primar before breakfast., Disp: 90 capsule, Rfl: 1        Allergies: Patient has no known allergies. Anesthesia Evaluation    Patient summary reviewed.     Anesthetic Complications           GI/Hepatic/Renal      (+) GERD                           Cardiovascu Lab Results   Component Value Date    INR 0.95 06/30/2020         Airway      Mallampati: II       Cardiovascular    Cardiovascular exam normal.         Dental    No notable dental history.          Pulmonary    Pulmonary exam normal.                 Ot

## 2020-07-08 LAB
DEPRECATED RDW RBC AUTO: 42.6 FL (ref 35.1–46.3)
ERYTHROCYTE [DISTWIDTH] IN BLOOD BY AUTOMATED COUNT: 13.1 % (ref 11–15)
HCT VFR BLD AUTO: 29.7 % (ref 35–48)
HGB BLD-MCNC: 9.6 G/DL (ref 12–16)
MCH RBC QN AUTO: 28.7 PG (ref 26–34)
MCHC RBC AUTO-ENTMCNC: 32.3 G/DL (ref 31–37)
MCV RBC AUTO: 88.9 FL (ref 80–100)
PLATELET # BLD AUTO: 261 10(3)UL (ref 150–450)
RBC # BLD AUTO: 3.34 X10(6)UL (ref 3.8–5.3)
WBC # BLD AUTO: 15.9 X10(3) UL (ref 4–11)

## 2020-07-08 PROCEDURE — 99024 POSTOP FOLLOW-UP VISIT: CPT | Performed by: ORTHOPAEDIC SURGERY

## 2020-07-08 PROCEDURE — 99232 SBSQ HOSP IP/OBS MODERATE 35: CPT | Performed by: INTERNAL MEDICINE

## 2020-07-08 RX ORDER — TIZANIDINE 2 MG/1
1 TABLET ORAL 3 TIMES DAILY PRN
Status: DISCONTINUED | OUTPATIENT
Start: 2020-07-08 | End: 2020-07-10

## 2020-07-08 RX ORDER — OXYCODONE HYDROCHLORIDE 5 MG/1
5 TABLET ORAL EVERY 4 HOURS PRN
Status: DISCONTINUED | OUTPATIENT
Start: 2020-07-08 | End: 2020-07-10

## 2020-07-08 RX ORDER — OXYCODONE HYDROCHLORIDE 5 MG/1
2.5 TABLET ORAL EVERY 4 HOURS PRN
Status: DISCONTINUED | OUTPATIENT
Start: 2020-07-08 | End: 2020-07-10

## 2020-07-08 RX ORDER — TRAMADOL HYDROCHLORIDE 50 MG/1
50 TABLET ORAL 3 TIMES DAILY
Status: DISCONTINUED | OUTPATIENT
Start: 2020-07-08 | End: 2020-07-10

## 2020-07-08 RX ORDER — OXYCODONE HYDROCHLORIDE 10 MG/1
10 TABLET ORAL EVERY 4 HOURS PRN
Status: DISCONTINUED | OUTPATIENT
Start: 2020-07-08 | End: 2020-07-10

## 2020-07-08 NOTE — OPERATIVE REPORT
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 The knee was brought into extension, and the joint was held open with rake retractors. I began debriding scar tissue.   I debrided scar tissue from around the p anterolateral tibia. Using Lambotte osteotomes I was able to remove this tibial implant.   There was decent bone remaining, approximately AORI grade 2 on the femur with some contained defect of the lateral femoral condyle, as well as AOR I grade 1 of the t condyle of the femur, removing less than 1 mm of bone. I next placed the 3 mm offset guide and reamed for the offset  and subsequently for the boss of the femoral component, and then cut the box.   Following this I did remove the box cutting guide, a touch of lateral subluxation. I did perform a lateral release, from the level of the joint up to the superior two thirds of the patella. This gave perfect patellar tracking.   I then irrigated out the knee, performed Betadine lavage, injected periarticu

## 2020-07-08 NOTE — PROGRESS NOTES
Assumed care of pt at 1500. VSS, afebrile. Aox4. RA. purewick in place. Pt on a CPM. Two hours on two hours off, needs to be taken off at 8pm. No c/o pain. Drain on knee, emptied by pct. Resting comfortably with call light in place.  Will continue to [facility]

## 2020-07-08 NOTE — PHYSICAL THERAPY NOTE
PHYSICAL THERAPY KNEE EVALUATION - INPATIENT     Room Number: 376/376-A  Evaluation Date: 7/8/2020  Type of Evaluation: Initial  Physician Order: PT Eval and Treat    Presenting Problem: (right tkr revision )  Reason for Therapy: Mobility Dysfunction and D SITE LEFT (CPT=19281)  AGE 25    CLOGGED DUCT   • OTHER SURGICAL HISTORY  1/1/2005    thyroid surgery XRT   • OTHER SURGICAL HISTORY  05/2019    R knee CONNIE   • TOTAL KNEE REPLACEMENT Bilateral        HOME SITUATION  Type of Home: House            Stairs to ADDITIONAL TESTS                                 ACTIVITY TOLERANCE                         O2 WALK                  AM-PAC '6-Clicks' INPATIENT SHORT FORM - BASIC MOBILITY  How much difficulty does the patient currently have. ..  - EOB.  . Dizziness passed, and requested to return to bed. Assisted back to bed min a 1-2, and noted improvement  with ability to complete SLR. Applied ice, returned her to CPM without sig. Difficulty.     Exercise/Education Provided:  Bed mobility  Adonis overall the evaluation complexity is considered moderate. These impairments and comorbidities manifest themselves as functional limitations in independent bed mobility, transfers, and gait.   The patient is below baseline and would benefit from skilled inp

## 2020-07-08 NOTE — PLAN OF CARE
No c/o pain on knee, pt still has some numbness from spinal anesthesia and block. She has some headache, but Tylenol dose has been max for the last 24h. Episode of vomiting this morning, better with Zofran administration.  CPM is now off for 2h to allow marilyn

## 2020-07-08 NOTE — PROGRESS NOTES
NURSING ADMISSION NOTE      Patient admitted via bed. Oriented to room. Safety precautions initiated. Bed in low position. Call light in reach. Consult paged out to Dr. Carmen Ventura for post-op management.

## 2020-07-08 NOTE — PLAN OF CARE
PT arrived from PACU drowsy, oriented x4. Block in effect, able to wiggle toes, still has decreased sensation and explains leg feels heavy. No complaints of pain at this time. BP continues to be low, IVFS infusing per orders.  Using CPM continuously while i

## 2020-07-08 NOTE — CONSULTS
[facility]  [address] [facility] Patient Status:  Inpatient    5/3/1955 MRN [id number]   [facility] 3SW-A Attending Albert Wray MD   Hosp Day # 0 PCP Robert Obrien MD     Reason for consult: med mgt    Requested by: dr. garcía • Heart Disease Father    • Heart Disease Mother         Allergies: No Known Allergies    Medications:  No current facility-administered medications on file prior to encounter. losartan 100 MG Oral Tab, Take 100 mg by mouth daily. , Disp: , Rfl:   Sam nontender, nondistended. Neurologic: No focal neurological deficits. CNII-XII grossly intact. Musculoskeletal: Moves all extremities. Right knee dressed  Extremities: No edema or cyanosis. Integument: No rashes or lesions.    Psychiatric: Appropriate m

## 2020-07-08 NOTE — OCCUPATIONAL THERAPY NOTE
OCCUPATIONAL THERAPY EVALUATION - INPATIENT     Room Number: 376/376-A  Evaluation Date: 7/8/2020  Type of Evaluation: Initial  Presenting Problem: (R TKA)    Physician Order: IP Consult to Occupational Therapy  Reason for Therapy: ADL/IADL Dysfunction and SITUATION  Type of Home: House     Lives With: Family    Toilet and Equipment: Standard height toilet; Toilet riser with arms  Shower/Tub and Equipment: Walk-in shower; Shower chair             Drives: Yes  Patient Regularly Uses: Reading glasses    Prior Lesli Jones brushing teeth?: None  -   Eating meals?: None    AM-PAC Score:  Score: 18  Approx Degree of Impairment: 46.65%  Standardized Score (AM-PAC Scale): 38.66  CMS Modifier (G-Code): CK    FUNCTIONAL TRANSFER ASSESSMENT  Supine to Sit : Minimum assistance  Sit return safely to her prior level of function. The AM-LILLIAN ' '6-Clicks' Inpatient Daily Activity Short Form was completed and  this patient  is demonstrating a  47% degree impairment in activities of daily living.  Research supports that patients with this

## 2020-07-08 NOTE — CM/SW NOTE
73 yo sp total knee replacement. Post op protocol orders for discharge planning. Preop referral to [facility] home health from surgeon's office.      HOME SITUATION  Type of Home: House  Lives With: Family     Toilet and Equipment: Standard height toile

## 2020-07-08 NOTE — PROGRESS NOTES
[facility] Ortho Progress Note    Subjective: No acute events. Had nausea/vomiting this morning, no further nausea this afternoon. Tolerated diet. Urinating adequately. Tried getting up with therapy but SBP dropped and patient dizzy.     Objective: No distress, si

## 2020-07-09 LAB
BASOPHILS # BLD AUTO: 0.01 X10(3) UL (ref 0–0.2)
BASOPHILS NFR BLD AUTO: 0.1 %
DEPRECATED RDW RBC AUTO: 42.9 FL (ref 35.1–46.3)
EOSINOPHIL # BLD AUTO: 0.02 X10(3) UL (ref 0–0.7)
EOSINOPHIL NFR BLD AUTO: 0.2 %
ERYTHROCYTE [DISTWIDTH] IN BLOOD BY AUTOMATED COUNT: 13.1 % (ref 11–15)
HCT VFR BLD AUTO: 21.5 % (ref 35–48)
HGB BLD-MCNC: 7.2 G/DL (ref 12–16)
IMM GRANULOCYTES # BLD AUTO: 0.07 X10(3) UL (ref 0–1)
IMM GRANULOCYTES NFR BLD: 0.5 %
LYMPHOCYTES # BLD AUTO: 2.01 X10(3) UL (ref 1–4)
LYMPHOCYTES NFR BLD AUTO: 15.4 %
MCH RBC QN AUTO: 29.6 PG (ref 26–34)
MCHC RBC AUTO-ENTMCNC: 33.5 G/DL (ref 31–37)
MCV RBC AUTO: 88.5 FL (ref 80–100)
MONOCYTES # BLD AUTO: 1.15 X10(3) UL (ref 0.1–1)
MONOCYTES NFR BLD AUTO: 8.8 %
NEUTROPHILS # BLD AUTO: 9.82 X10 (3) UL (ref 1.5–7.7)
NEUTROPHILS # BLD AUTO: 9.82 X10(3) UL (ref 1.5–7.7)
NEUTROPHILS NFR BLD AUTO: 75 %
PLATELET # BLD AUTO: 186 10(3)UL (ref 150–450)
RBC # BLD AUTO: 2.43 X10(6)UL (ref 3.8–5.3)
WBC # BLD AUTO: 13.1 X10(3) UL (ref 4–11)

## 2020-07-09 PROCEDURE — 99024 POSTOP FOLLOW-UP VISIT: CPT | Performed by: ORTHOPAEDIC SURGERY

## 2020-07-09 PROCEDURE — 99232 SBSQ HOSP IP/OBS MODERATE 35: CPT | Performed by: INTERNAL MEDICINE

## 2020-07-09 NOTE — PLAN OF CARE
PT AOX4 this PM. VSS on RA. , IS. IVFS infusing. Decreased sensation to LLE, strong plantar flexion, absent dorsiflexion. Able to wiggle toes. Continuous CPM orders, except while asleep. MD aware, spoke with Dr. Zuleyka Robin.  Ace wrap removed by surgeon, an

## 2020-07-09 NOTE — PROGRESS NOTES
Post Op Day 1 Ortho Note    Status Post Nerve Block:  Type of Nerve Block: Right adductor canal and IPACK  Single Injection Nerve Block    Post op review: No evidence of immediate block related complications, No paresthesia noted, Able to plantar flex foot

## 2020-07-09 NOTE — PROGRESS NOTES
[facility]  Progress Note     Abena Fishman Patient Status:  Inpatient    5/3/1955 MRN [id number]   [facility] 3SW-A Attending Kamron Kasper MD   [address] Day # 2 PCP Elisabet Velazco MD     Chief Complaint: s/p R knee revision    S: P 50 mg Oral TID   • acetaminophen  1,000 mg Oral QID   • Pantoprazole Sodium  40 mg Oral QAM AC   • Levothyroxine Sodium  150 mcg Oral Before breakfast   • Senna  17.2 mg Oral Nightly   • docusate sodium  100 mg Oral BID   • ferrous sulfate  325 mg Oral Perez [place]

## 2020-07-09 NOTE — CM/SW NOTE
Discussed pt during rounds. She will need a CPM for home discharge.   Residential liaison will order from [address], [address] [facility]  [place] [address]

## 2020-07-09 NOTE — PHYSICAL THERAPY NOTE
PHYSICAL THERAPY KNEE TREATMENT NOTE - INPATIENT     Room Number: 376/376-A     Session: 1   Number of Visits to Meet Established Goals: 5    Presenting Problem: (right tkr revision )    Problem List  Principal Problem:    Failed total knee arthroplasty, Extremity: Weight Bearing as Tolerated(needs AFO for right LE)       PAIN ASSESSMENT   Ratin  Location: (knee)  Management Techniques: Activity promotion; Body mechanics;Breathing techniques;Relaxation;Repositioning    BALANCE  Static Sitting: Normal  D reminders to lift right LE to avoid tripping over toes. Needs cues to point toes forward. Climb stairs x1 steps pattern x2 sets, with use of railing. Returned to room and completed TKR protocol HEP.   Sheet given for assist.          Exercises AM Sess #6        Goal Comments: Goals established on 7/8/2020  Progress 7/9/2020

## 2020-07-09 NOTE — PROGRESS NOTES
[facility] Orth[facility] Progress Note    Subjective: No acute events. Tolerating diet, no n/v. Urinating adequately. Pain is minimal, she feels comfortable. No dizziness, no hypotension. Sensation maybe slightly improved in foot/leg.     Objective: No distress, sitting u

## 2020-07-09 NOTE — PROGRESS NOTES
Acute Pain Service    Post Op Day 2 Ortho Note    Assessed patient in bed. States she is tired but feels much better today. Weak dorsiflexion R foot - Dr. Cristal Hu aware.  Ambulated with PT and did all PT exercises well without AFO brace - but plan for AFO

## 2020-07-09 NOTE — PLAN OF CARE
Pt AOx4. R.A. C/o mild pain to right knee, relieved by PO pain meds. Denies numbness and tingling to RLE. Unable to dorsiflex right foot, MD aware. [place] called for AFO brace.  Unable to use CPM in chair, will resume 2hr on/off schedule per order while

## 2020-07-10 VITALS
BODY MASS INDEX: 34.16 KG/M2 | TEMPERATURE: 99 F | DIASTOLIC BLOOD PRESSURE: 64 MMHG | HEIGHT: 65 IN | WEIGHT: 205 LBS | OXYGEN SATURATION: 99 % | SYSTOLIC BLOOD PRESSURE: 138 MMHG | RESPIRATION RATE: 16 BRPM | HEART RATE: 82 BPM

## 2020-07-10 PROBLEM — T84.018A FAILED TOTAL KNEE ARTHROPLASTY, INITIAL ENCOUNTER: Status: RESOLVED | Noted: 2020-07-07 | Resolved: 2020-07-10

## 2020-07-10 PROBLEM — Z96.659: Status: RESOLVED | Noted: 2020-07-07 | Resolved: 2020-07-10

## 2020-07-10 PROBLEM — T84.018A: Status: RESOLVED | Noted: 2020-07-07 | Resolved: 2020-07-10

## 2020-07-10 PROBLEM — Z96.659 FAILED TOTAL KNEE ARTHROPLASTY, INITIAL ENCOUNTER (HCC): Status: RESOLVED | Noted: 2020-07-07 | Resolved: 2020-07-10

## 2020-07-10 PROBLEM — T84.018A FAILED TOTAL KNEE ARTHROPLASTY, INITIAL ENCOUNTER (HCC): Status: RESOLVED | Noted: 2020-07-07 | Resolved: 2020-07-10

## 2020-07-10 PROBLEM — Z96.659 FAILED TOTAL KNEE ARTHROPLASTY, INITIAL ENCOUNTER: Status: RESOLVED | Noted: 2020-07-07 | Resolved: 2020-07-10

## 2020-07-10 LAB
BASOPHILS # BLD AUTO: 0.01 X10(3) UL (ref 0–0.2)
BASOPHILS NFR BLD AUTO: 0.1 %
DEPRECATED RDW RBC AUTO: 43.9 FL (ref 35.1–46.3)
EOSINOPHIL # BLD AUTO: 0.5 X10(3) UL (ref 0–0.7)
EOSINOPHIL NFR BLD AUTO: 4.6 %
ERYTHROCYTE [DISTWIDTH] IN BLOOD BY AUTOMATED COUNT: 13.4 % (ref 11–15)
HCT VFR BLD AUTO: 22.4 % (ref 35–48)
HGB BLD-MCNC: 7.2 G/DL (ref 12–16)
IMM GRANULOCYTES # BLD AUTO: 0.08 X10(3) UL (ref 0–1)
IMM GRANULOCYTES NFR BLD: 0.7 %
LYMPHOCYTES # BLD AUTO: 3.65 X10(3) UL (ref 1–4)
LYMPHOCYTES NFR BLD AUTO: 33.8 %
MCH RBC QN AUTO: 28.8 PG (ref 26–34)
MCHC RBC AUTO-ENTMCNC: 32.1 G/DL (ref 31–37)
MCV RBC AUTO: 89.6 FL (ref 80–100)
MONOCYTES # BLD AUTO: 1.08 X10(3) UL (ref 0.1–1)
MONOCYTES NFR BLD AUTO: 10 %
NEUTROPHILS # BLD AUTO: 5.47 X10 (3) UL (ref 1.5–7.7)
NEUTROPHILS # BLD AUTO: 5.47 X10(3) UL (ref 1.5–7.7)
NEUTROPHILS NFR BLD AUTO: 50.8 %
PLATELET # BLD AUTO: 195 10(3)UL (ref 150–450)
RBC # BLD AUTO: 2.5 X10(6)UL (ref 3.8–5.3)
WBC # BLD AUTO: 10.8 X10(3) UL (ref 4–11)

## 2020-07-10 PROCEDURE — 99024 POSTOP FOLLOW-UP VISIT: CPT | Performed by: ORTHOPAEDIC SURGERY

## 2020-07-10 PROCEDURE — 99239 HOSP IP/OBS DSCHRG MGMT >30: CPT | Performed by: INTERNAL MEDICINE

## 2020-07-10 RX ORDER — OXYCODONE HYDROCHLORIDE 5 MG/1
TABLET ORAL EVERY 4 HOURS PRN
Qty: 20 TABLET | Refills: 0 | Status: SHIPPED | OUTPATIENT
Start: 2020-07-10 | End: 2021-03-03 | Stop reason: ALTCHOICE

## 2020-07-10 RX ORDER — TRAMADOL HYDROCHLORIDE 50 MG/1
50 TABLET ORAL 3 TIMES DAILY
Qty: 45 TABLET | Refills: 0 | Status: SHIPPED | OUTPATIENT
Start: 2020-07-10 | End: 2020-07-25

## 2020-07-10 RX ORDER — ENOXAPARIN SODIUM 100 MG/ML
40 INJECTION SUBCUTANEOUS DAILY
Qty: 7.2 ML | Refills: 0 | Status: SHIPPED | OUTPATIENT
Start: 2020-07-10 | End: 2020-07-28

## 2020-07-10 RX ORDER — ACETAMINOPHEN 500 MG
1000 TABLET ORAL EVERY 8 HOURS
Qty: 90 TABLET | Refills: 0 | Status: SHIPPED | OUTPATIENT
Start: 2020-07-10 | End: 2020-07-25

## 2020-07-10 RX ORDER — PSEUDOEPHEDRINE HCL 30 MG
100 TABLET ORAL 2 TIMES DAILY
Qty: 30 CAPSULE | Refills: 0 | Status: SHIPPED | OUTPATIENT
Start: 2020-07-10 | End: 2021-03-03 | Stop reason: ALTCHOICE

## 2020-07-10 RX ORDER — MELATONIN
325
Qty: 30 TABLET | Refills: 0 | Status: SHIPPED | OUTPATIENT
Start: 2020-07-11 | End: 2021-03-03 | Stop reason: ALTCHOICE

## 2020-07-10 NOTE — DISCHARGE SUMMARY
[facility] HOSPITALIST  DISCHARGE SUMMARY     Gloria Albert Patient Status:  Inpatient    5/3/1955 MRN [id number]   [facility] 3SW-A Attending No att. providers found   [facility] Day # 3 PCP Alfreda Seaman MD     Date of Admission: 2020  Date of that she had her knee aspirated to rule out infection by Dr. Erin Goldberg was told that it was negative.  She denies any instability to the knee and it does not feel loose.  She denies any numbness or tingling to the lower extremity.  She reports that the [facility] the skin daily for 18 days.    Stop taking on:  July 28, 2020  Quantity:  7.2 mL  Refills:  0     ferrous sulfate 325 (65 FE) MG Tbec  Start taking on:  July 11, 2020      Take 1 tablet (325 mg total) by mouth daily with breakfast.   Quantity:  30 tablet  R SYNTHROID      Take 150 mcg by mouth before breakfast.   Refills:  0     losartan 100 MG Tabs  Commonly known as:  COZAAR      Take 100 mg by mouth daily.    Refills:  0        STOP taking these medications    Acetaminophen Extra Strength 500 MG Caps  Gener

## 2020-07-10 NOTE — PLAN OF CARE
Pt AOx4. R.A. C/o mild pain to right knee, relieved by tylenol currently declining tramadol. Denies N/T to RLE. Still unable to dorsiflex right foot, MD aware. AFO brace when OOB. CPM while in bed, towel roll under foot while up in chair.  Ambulating crispin

## 2020-07-10 NOTE — PROGRESS NOTES
[facility] Ortho Progress Note    Subjective: No acute events. Tolerating diet, no n/v. Urinating adequately. Pain is minimal, she feels comfortable. No dizziness, no hypotension. Sensation reportedly stable from yesterday.   Patient states she feels comfortable a

## 2020-07-10 NOTE — PLAN OF CARE
AOX4, on RA. VSS. Mild complaints of pain tonight, OK with taking scheduled tramadol and tramadol, declines need for prn oxy when offered. Requested prn antianxiety medicine before bed.  Sensation in both legs intact, appropriate plantar flexion in R foot,

## 2020-07-10 NOTE — OCCUPATIONAL THERAPY NOTE
OCCUPATIONAL THERAPY TREATMENT NOTE - INPATIENT     Room Number: 376/376-A  Session: 2  Number of Visits to Meet Established Goals: 3    Presenting Problem: (R TKA)    History related to current admission: Admitted for planned R TKA no 7/7/20 due to failed BEARING RESTRICTION  Weight Bearing Restriction: R lower extremity        R Lower Extremity: Weight Bearing as Tolerated(needs AFO for right LE)       PAIN ASSESSMENT  Ratin  Location: left thigh   Management Techniques:  Activity promotion     ACTIVITY addressed; Family present    ASSESSMENT   Patient demonstrates good understanding regarding use of AFO RLE. Patient is unable to rivas independently at this time, however  was present and was able to assist with donning/doffing with mod I.   Patient i

## 2020-07-10 NOTE — PROGRESS NOTES
Pt cleared by all MD's for discharge. Discharge education completed at bedside, all questions answered. PIV removed, belongings packed. Scripts filled by [address] [place] [address] and given to patient. Pt discharging to home via wheelchair.

## 2020-07-10 NOTE — OCCUPATIONAL THERAPY NOTE
OCCUPATIONAL THERAPY TREATMENT NOTE - INPATIENT     Room Number: 376/376-A  Session: 1  Number of Visits to Meet Established Goals: 3    Presenting Problem: (R TKA)    History related to current admission:     Admitted for planned R TKA no 7/7/20 due to fa Extremity: Weight Bearing as Tolerated(needs AFO for right LE)       PAIN ASSESSMENT  Ratin  Location: (R knee)  Management Techniques: Body mechanics; Relaxation;Repositioning     ACTIVITY TOLERANCE                         O2 SATURATIONS simplification techniques;ADL training;Functional transfer training; Endurance training;Patient/Family education;Patient/Family training;Equipment eval/education; Compensatory technique education;Continued evaluation  Rehab Potential : Good  Frequency (Obs):

## 2020-07-13 NOTE — CM/SW NOTE
07/13/20 0800   Discharge disposition   Expected discharge disposition Home-Health   Name of Facillity/Home Care/Hospice Residential   HME provider   ([facility] for [facility])   Discharge transportation Private car   DC 7/10/2020

## 2020-07-15 ENCOUNTER — PATIENT OUTREACH (OUTPATIENT)
Dept: CASE MANAGEMENT | Age: 65
End: 2020-07-15

## 2020-07-15 ENCOUNTER — TELEPHONE (OUTPATIENT)
Dept: ORTHOPEDICS CLINIC | Facility: CLINIC | Age: 65
End: 2020-07-15

## 2020-07-15 NOTE — TELEPHONE ENCOUNTER
Patient daughter is calling because her mom is experiencing a burning sensation and wants to come in sooner. [facility]

## 2020-07-15 NOTE — TELEPHONE ENCOUNTER
Patient's daughter reports patient has had development of burning pain in left thigh (nonoperative extremity). No radiation of pain, pain is located on outside of thigh.  No tight/restrictive clothing, and she has been up and moving frequently/not sitting i

## 2020-07-16 ENCOUNTER — OFFICE VISIT (OUTPATIENT)
Dept: INTERNAL MEDICINE CLINIC | Facility: CLINIC | Age: 65
End: 2020-07-16
Payer: MEDICARE

## 2020-07-16 VITALS
BODY MASS INDEX: 33.82 KG/M2 | HEART RATE: 100 BPM | SYSTOLIC BLOOD PRESSURE: 122 MMHG | RESPIRATION RATE: 20 BRPM | TEMPERATURE: 99 F | DIASTOLIC BLOOD PRESSURE: 64 MMHG | HEIGHT: 65 IN | WEIGHT: 203 LBS

## 2020-07-16 DIAGNOSIS — F51.04 CHRONIC INSOMNIA: ICD-10-CM

## 2020-07-16 DIAGNOSIS — K21.9 GASTROESOPHAGEAL REFLUX DISEASE WITHOUT ESOPHAGITIS: ICD-10-CM

## 2020-07-16 DIAGNOSIS — H81.13 BENIGN PAROXYSMAL POSITIONAL VERTIGO DUE TO BILATERAL VESTIBULAR DISORDER: ICD-10-CM

## 2020-07-16 DIAGNOSIS — F41.1 GAD (GENERALIZED ANXIETY DISORDER): ICD-10-CM

## 2020-07-16 DIAGNOSIS — R20.1 HYPOESTHESIA: ICD-10-CM

## 2020-07-16 DIAGNOSIS — I10 BENIGN ESSENTIAL HTN: ICD-10-CM

## 2020-07-16 DIAGNOSIS — E89.0 POSTOPERATIVE HYPOTHYROIDISM: ICD-10-CM

## 2020-07-16 DIAGNOSIS — R53.82 CHRONIC FATIGUE: ICD-10-CM

## 2020-07-16 DIAGNOSIS — D62 ACUTE BLOOD LOSS ANEMIA: ICD-10-CM

## 2020-07-16 DIAGNOSIS — Z86.73 HISTORY OF ARTERIAL ISCHEMIC STROKE: ICD-10-CM

## 2020-07-16 DIAGNOSIS — M85.89 OSTEOPENIA OF MULTIPLE SITES: ICD-10-CM

## 2020-07-16 DIAGNOSIS — M15.9 PRIMARY OSTEOARTHRITIS INVOLVING MULTIPLE JOINTS: ICD-10-CM

## 2020-07-16 DIAGNOSIS — E55.9 VITAMIN D DEFICIENCY: ICD-10-CM

## 2020-07-16 DIAGNOSIS — Z00.00 MEDICARE ANNUAL WELLNESS VISIT, SUBSEQUENT: Primary | ICD-10-CM

## 2020-07-16 PROCEDURE — 99214 OFFICE O/P EST MOD 30 MIN: CPT | Performed by: INTERNAL MEDICINE

## 2020-07-16 PROCEDURE — G0439 PPPS, SUBSEQ VISIT: HCPCS | Performed by: INTERNAL MEDICINE

## 2020-07-16 PROCEDURE — 1111F DSCHRG MED/CURRENT MED MERGE: CPT | Performed by: INTERNAL MEDICINE

## 2020-07-16 RX ORDER — ATORVASTATIN CALCIUM 40 MG/1
1 TABLET, FILM COATED ORAL NIGHTLY
COMMUNITY
Start: 2020-07-12 | End: 2021-03-03

## 2020-07-16 NOTE — PROGRESS NOTES
HPI:   Ghada Adams is a 72year old female who presents for a Medicare Subsequent Annual Wellness visit (Pt already had Initial Annual Wellness) and follow up on chronic issues/recent hospitalization.   Her last annual assessment has been over 1 year: A functional status. Shop for groceries: Need some help   She has difficulties Taking Meds as Rx'd based on screening of functional status.    Taking medications as prescribed: Need some help   She has Vision problems based on screening of functional status reflux disease)     Class 1 obesity due to excess calories with serious comorbidity and body mass index (BMI) of 33.0 to 33.9 in adult     JUANCHO (generalized anxiety disorder)     Primary osteoarthritis involving multiple joints     Chronic fatigue     Osteo tablet (325 mg total) by mouth daily with breakfast.  losartan 100 MG Oral Tab, Take 100 mg by mouth daily. calcitRIOL 0.5 MCG Oral Cap, Take 0.5 mcg by mouth daily.   Levothyroxine Sodium 150 MCG Oral Tab, Take 150 mcg by mouth before breakfast.  ALPRAZol blurred vision or double vision  HEENT: denies nasal congestion, sinus pain or ST  LUNGS: denies shortness of breath with exertion  CARDIOVASCULAR: denies chest pain on exertion  GI: denies abdominal pain, denies heartburn  : denies dysuria, vaginal disc Antonieta Dwyer is a 72year old female who presents for a Medicare Assessment. PLAN SUMMARY:   1. Medicare annual wellness visit, subsequent  Age appropriate health guidance and counseling provided.   Reminded patient to follow up with GI for screeni some curling of right toes/foot drop; advised her to discuss this with ortho - may need Neurology evaluation. 13. Benign paroxysmal positional vertigo due to bilateral vestibular disorder  No recent episodes. Monitor.     14. Chronic fatigue  Stable jigna No results found for this or any previous visit. No flowsheet data found. Fecal Occult Blood Annually No results found for: FOB No flowsheet data found.     Glaucoma Screening      Ophthalmology Visit Annually: Diabetics, FHx Glaucoma, AA>50, > Lab or Procedure External Lab or Procedure      Annual Monitoring of Persistent     Medications (ACE/ARB, digoxin diuretics, anticonvulsants.)    Potassium  Annually Potassium (mmol/L)   Date Value   07/18/2020 3.9     POTASSIUM (mmol/L)   Date Value   04/

## 2020-07-16 NOTE — PATIENT INSTRUCTIONS
- Hable con el Dr. Ricki Medel en el dedo del pie derecho / pie  - Deje de lissa medicamentos para la presión arterial por ahora. Controle la presión arterial florecita vez al día en casa.  Háganos saber si obtiene 2-3 días de lecturas superiores

## 2020-07-16 NOTE — TELEPHONE ENCOUNTER
Future Appointments   Date Time Provider Oc [place]pos   7/16/2020  1:00 PM Gala Duarte MD [facility] [place]   7/20/2020  4:30 PM Gail Arroyo MD [facility]O [facility] [place]   7/24/2020 10:00 AM Gail Arroyo MD [facility] ORTHO [facility] [place]

## 2020-07-18 ENCOUNTER — HOSPITAL ENCOUNTER (EMERGENCY)
Age: 65
Discharge: HOME | End: 2020-07-18
Attending: EMERGENCY MEDICINE
Payer: MEDICARE

## 2020-07-18 VITALS
RESPIRATION RATE: 18 BRPM | OXYGEN SATURATION: 98 % | BODY MASS INDEX: 28.63 KG/M2 | WEIGHT: 200 LBS | SYSTOLIC BLOOD PRESSURE: 132 MMHG | HEIGHT: 70 IN | DIASTOLIC BLOOD PRESSURE: 65 MMHG | TEMPERATURE: 99 F | HEART RATE: 76 BPM

## 2020-07-18 DIAGNOSIS — M62.838 SPASM OF MUSCLE: ICD-10-CM

## 2020-07-18 DIAGNOSIS — E83.51 HYPOCALCEMIA: Primary | ICD-10-CM

## 2020-07-18 LAB
ALBUMIN SERPL-MCNC: 3.3 G/DL (ref 3.4–5)
ALBUMIN/GLOB SERPL: 0.7 {RATIO} (ref 1–2)
ALP LIVER SERPL-CCNC: 57 U/L (ref 50–130)
ALT SERPL-CCNC: 38 U/L (ref 13–56)
ANION GAP SERPL CALC-SCNC: 7 MMOL/L (ref 0–18)
AST SERPL-CCNC: 20 U/L (ref 15–37)
BASOPHILS # BLD AUTO: 0.01 X10(3) UL (ref 0–0.2)
BASOPHILS NFR BLD AUTO: 0.1 %
BILIRUB SERPL-MCNC: 0.4 MG/DL (ref 0.1–2)
BUN BLD-MCNC: 12 MG/DL (ref 7–18)
BUN/CREAT SERPL: 15 (ref 10–20)
CALCIUM BLD-MCNC: 8.4 MG/DL (ref 8.5–10.1)
CHLORIDE SERPL-SCNC: 104 MMOL/L (ref 98–112)
CO2 SERPL-SCNC: 27 MMOL/L (ref 21–32)
CREAT BLD-MCNC: 0.8 MG/DL (ref 0.55–1.02)
DEPRECATED RDW RBC AUTO: 46.3 FL (ref 35.1–46.3)
EOSINOPHIL # BLD AUTO: 0.58 X10(3) UL (ref 0–0.7)
EOSINOPHIL NFR BLD AUTO: 5.1 %
ERYTHROCYTE [DISTWIDTH] IN BLOOD BY AUTOMATED COUNT: 14.6 % (ref 11–15)
GLOBULIN PLAS-MCNC: 4.5 G/DL (ref 2.8–4.4)
GLUCOSE BLD-MCNC: 101 MG/DL (ref 70–99)
HAV IGM SER QL: 2 MG/DL (ref 1.6–2.6)
HCT VFR BLD AUTO: 27.6 % (ref 35–48)
HGB BLD-MCNC: 8.8 G/DL (ref 12–16)
IMM GRANULOCYTES # BLD AUTO: 0.13 X10(3) UL (ref 0–1)
IMM GRANULOCYTES NFR BLD: 1.1 %
LYMPHOCYTES # BLD AUTO: 2.69 X10(3) UL (ref 1–4)
LYMPHOCYTES NFR BLD AUTO: 23.6 %
M PROTEIN MFR SERPL ELPH: 7.8 G/DL (ref 6.4–8.2)
MCH RBC QN AUTO: 28.8 PG (ref 26–34)
MCHC RBC AUTO-ENTMCNC: 31.9 G/DL (ref 31–37)
MCV RBC AUTO: 90.2 FL (ref 80–100)
MONOCYTES # BLD AUTO: 0.73 X10(3) UL (ref 0.1–1)
MONOCYTES NFR BLD AUTO: 6.4 %
NEUTROPHILS # BLD AUTO: 7.27 X10 (3) UL (ref 1.5–7.7)
NEUTROPHILS # BLD AUTO: 7.27 X10(3) UL (ref 1.5–7.7)
NEUTROPHILS NFR BLD AUTO: 63.7 %
OSMOLALITY SERPL CALC.SUM OF ELEC: 286 MOSM/KG (ref 275–295)
PLATELET # BLD AUTO: 550 10(3)UL (ref 150–450)
POTASSIUM SERPL-SCNC: 3.9 MMOL/L (ref 3.5–5.1)
RBC # BLD AUTO: 3.06 X10(6)UL (ref 3.8–5.3)
SODIUM SERPL-SCNC: 138 MMOL/L (ref 136–145)
WBC # BLD AUTO: 11.4 X10(3) UL (ref 4–11)

## 2020-07-18 PROCEDURE — 80053 COMPREHEN METABOLIC PANEL: CPT | Performed by: EMERGENCY MEDICINE

## 2020-07-18 PROCEDURE — 99284 EMERGENCY DEPT VISIT MOD MDM: CPT

## 2020-07-18 PROCEDURE — 96360 HYDRATION IV INFUSION INIT: CPT

## 2020-07-18 PROCEDURE — 83735 ASSAY OF MAGNESIUM: CPT | Performed by: EMERGENCY MEDICINE

## 2020-07-18 PROCEDURE — 85025 COMPLETE CBC W/AUTO DIFF WBC: CPT | Performed by: EMERGENCY MEDICINE

## 2020-07-18 RX ORDER — CALCIUM CARBONATE 200(500)MG
500 TABLET,CHEWABLE ORAL ONCE
Status: COMPLETED | OUTPATIENT
Start: 2020-07-18 | End: 2020-07-18

## 2020-07-19 NOTE — ED PROVIDER NOTES
Patient Seen in: [facility] Emergency Department In [place]      History   Patient presents with:  Musculoskeletal Problem    Stated Complaint: Involuntary muscle spasms or twitching of right arm an hour ago that lasted myles*    HPI    77-year-old woman wh SURGERY Right 03/22/2019    Dr. Reji Terrazas at [address] Right 7/7/2020    Performed by Franci Magana MD at [facility] MAIN OR   • BRAD LOCALIZATION WIRE 1 SITE LEFT (CPT=19281)  AGE 25    CLOGGED DUCT   • OTHER SURGICAL HISTORY  1/1 (*)     RBC 3.06 (*)     HGB 8.8 (*)     HCT 27.6 (*)     .0 (*)     All other components within normal limits   MAGNESIUM - Normal   CBC WITH DIFFERENTIAL WITH PLATELET    Narrative:      The following orders were created for panel order CBC WITH DI

## 2020-07-19 NOTE — ED INITIAL ASSESSMENT (HPI)
Involuntary muscle spasms or twitching of right arm an hour ago that lasted approx 5 min (daughter has video of the incident). A/O x 4. Pt also has right knee replacement at the beginning of this month.

## 2020-07-20 ENCOUNTER — OFFICE VISIT (OUTPATIENT)
Dept: ORTHOPEDICS CLINIC | Facility: CLINIC | Age: 65
End: 2020-07-20
Payer: MEDICARE

## 2020-07-20 VITALS — HEIGHT: 70 IN | RESPIRATION RATE: 18 BRPM | OXYGEN SATURATION: 99 % | BODY MASS INDEX: 29 KG/M2 | HEART RATE: 87 BPM

## 2020-07-20 DIAGNOSIS — G57.12 MERALGIA PARESTHETICA OF LEFT SIDE: Primary | ICD-10-CM

## 2020-07-20 DIAGNOSIS — Z96.651 HISTORY OF REVISION OF TOTAL REPLACEMENT OF RIGHT KNEE JOINT: ICD-10-CM

## 2020-07-20 PROCEDURE — 99024 POSTOP FOLLOW-UP VISIT: CPT | Performed by: ORTHOPAEDIC SURGERY

## 2020-07-20 NOTE — PROGRESS NOTES
[facility] Progress Note    Subjective: Patient returns just under 2 weeks postop from revision right total knee replacement.   She is coming in early, prior to scheduled appointment this Friday for suture removal, for evaluation of the left thigh com subjective diminishment throughout SP, DP and tibial nerves.       Assessment/Plan: 15-year-old female 2 weeks postop status post revision right knee replacement, now with subjective numbness in the lateral femoral cutaneous nerve distribution of the left t

## 2020-07-24 ENCOUNTER — OFFICE VISIT (OUTPATIENT)
Dept: ORTHOPEDICS CLINIC | Facility: CLINIC | Age: 65
End: 2020-07-24
Payer: MEDICARE

## 2020-07-24 VITALS — HEART RATE: 88 BPM | HEIGHT: 70 IN | RESPIRATION RATE: 18 BRPM | OXYGEN SATURATION: 99 % | BODY MASS INDEX: 29 KG/M2

## 2020-07-24 DIAGNOSIS — Z96.651 HISTORY OF REVISION OF TOTAL REPLACEMENT OF RIGHT KNEE JOINT: Primary | ICD-10-CM

## 2020-07-24 PROCEDURE — 99024 POSTOP FOLLOW-UP VISIT: CPT | Performed by: ORTHOPAEDIC SURGERY

## 2020-07-24 RX ORDER — CELECOXIB 200 MG/1
200 CAPSULE ORAL DAILY
Qty: 30 CAPSULE | Refills: 0 | Status: SHIPPED | OUTPATIENT
Start: 2020-07-24 | End: 2020-07-24

## 2020-07-24 RX ORDER — CELECOXIB 200 MG/1
CAPSULE ORAL
Qty: 90 CAPSULE | Refills: 0 | Status: SHIPPED | OUTPATIENT
Start: 2020-07-24 | End: 2021-03-03

## 2020-07-24 NOTE — PROGRESS NOTES
[facility] Progress Note    Subjective: Patient returns to clinic just over 2 weeks postop from revision right total knee replacement. She reports she is not having much in the way of pain, and she is ambulating without an assist device.   She does t prescription for the next month in order to help decrease inflammation and pain and allow her to push through therapy. She states she does not need a refill on tramadol today, but if she does need any pain medication refills she should contact our clinic.

## 2020-07-24 NOTE — PROCEDURES
The surgical incision site was prepped with topical betadine and sutures were removed and steri strips placed. The patient tolerated the procedure well.     Rochelle Escobedo MD  [facility] Orthopedic Surgery

## 2020-08-03 RX ORDER — LEVOTHYROXINE SODIUM 150 UG/1
150 TABLET ORAL
Qty: 90 TABLET | Refills: 0 | Status: SHIPPED | OUTPATIENT
Start: 2020-08-03 | End: 2020-11-09

## 2020-08-03 RX ORDER — LEVOTHYROXINE SODIUM 0.15 MG/1
TABLET ORAL
Qty: 90 TABLET | Refills: 0 | Status: SHIPPED | OUTPATIENT
Start: 2020-08-03 | End: 2020-08-03 | Stop reason: CLARIF

## 2020-08-03 NOTE — TELEPHONE ENCOUNTER
Spoke to pt and states she is taking this dose however, she prefers brand name rx.      Failed protocol     Last refilL:  4/16/2020 Levothyroxine 150 mcg #90 NR    LOV:   7/16/2020 Dr Mele Nunez RTC 6 months  No FOV scheduled     **Accidentally sent rx - Chaim Chen

## 2020-08-17 ENCOUNTER — OFFICE VISIT (OUTPATIENT)
Dept: INTERNAL MEDICINE CLINIC | Facility: CLINIC | Age: 65
End: 2020-08-17
Payer: MEDICARE

## 2020-08-17 VITALS
HEART RATE: 78 BPM | RESPIRATION RATE: 16 BRPM | SYSTOLIC BLOOD PRESSURE: 118 MMHG | BODY MASS INDEX: 28.89 KG/M2 | DIASTOLIC BLOOD PRESSURE: 76 MMHG | TEMPERATURE: 98 F | HEIGHT: 70 IN | WEIGHT: 201.81 LBS

## 2020-08-17 DIAGNOSIS — M79.604 RIGHT LEG PAIN: ICD-10-CM

## 2020-08-17 DIAGNOSIS — M25.562 CHRONIC PAIN OF LEFT KNEE: ICD-10-CM

## 2020-08-17 DIAGNOSIS — M21.371 RIGHT FOOT DROP: Primary | ICD-10-CM

## 2020-08-17 DIAGNOSIS — G57.12 MERALGIA PARESTHETICA OF LEFT SIDE: ICD-10-CM

## 2020-08-17 DIAGNOSIS — G89.29 CHRONIC PAIN OF LEFT KNEE: ICD-10-CM

## 2020-08-17 PROCEDURE — 99214 OFFICE O/P EST MOD 30 MIN: CPT | Performed by: INTERNAL MEDICINE

## 2020-08-17 NOTE — PROGRESS NOTES
Patient presents with: Foot Pain: R foot loss of motion       HPI: Corbin Luevano presents for eval of a few issues as follows:  1. R foot drop - Onset has been since her total R knee revision on 7/7.  She's been working w/ occupational therapy at [address] [place] [address] Delfino Gregg MD at [address] [place] [address]   • BRAD LOCALIZATION WIRE 1 SITE LEFT (CPT=19281)  AGE 25    CLOGGED DUCT   • OTHER SURGICAL HISTORY  1/1/2005    thyroid surgery XRT   • OTHER SURGICAL HISTORY  05/2019    R knee CONNIE   • TOTAL KNEE REPLACEMENT Bilateral less      Drinks per session: 1 or 2      Binge frequency: Never      Comment: occasional    Drug use: No      PE:  /76 (BP Location: Left arm, Patient Position: Sitting, Cuff Size: adult)   Pulse 78   Temp 98.2 °F (36.8 °C) (Oral)   Resp 16   Ht 70\ of L knee - This has been going on over time since she's had to compensate for the recent R knee surgery. PLAN = Continue support measures. 5. RTC PRN or at next regularly scheduled OV.  Nikhil Neither verbally agrees to and understands the plan as outlined abo

## 2020-08-19 ENCOUNTER — HOSPITAL ENCOUNTER (OUTPATIENT)
Dept: GENERAL RADIOLOGY | Age: 65
Discharge: HOME | End: 2020-08-19
Attending: ORTHOPAEDIC SURGERY
Payer: MEDICARE

## 2020-08-19 DIAGNOSIS — Z96.651 HISTORY OF REVISION OF TOTAL REPLACEMENT OF RIGHT KNEE JOINT: ICD-10-CM

## 2020-08-19 PROBLEM — M21.371 RIGHT FOOT DROP: Status: ACTIVE | Noted: 2020-08-19

## 2020-08-19 PROBLEM — G57.12 MERALGIA PARESTHETICA OF LEFT SIDE: Status: ACTIVE | Noted: 2020-08-19

## 2020-08-19 PROBLEM — M25.562 CHRONIC PAIN OF LEFT KNEE: Status: ACTIVE | Noted: 2020-08-19

## 2020-08-19 PROBLEM — G89.29 CHRONIC PAIN OF LEFT KNEE: Status: ACTIVE | Noted: 2020-08-19

## 2020-08-19 PROBLEM — M79.604 RIGHT LEG PAIN: Status: ACTIVE | Noted: 2020-08-19

## 2020-08-19 PROCEDURE — 73564 X-RAY EXAM KNEE 4 OR MORE: CPT | Performed by: ORTHOPAEDIC SURGERY

## 2020-08-19 PROCEDURE — 73562 X-RAY EXAM OF KNEE 3: CPT | Performed by: ORTHOPAEDIC SURGERY

## 2020-08-21 ENCOUNTER — OFFICE VISIT (OUTPATIENT)
Dept: ORTHOPEDICS CLINIC | Facility: CLINIC | Age: 65
End: 2020-08-21
Payer: MEDICARE

## 2020-08-21 VITALS — OXYGEN SATURATION: 98 % | HEART RATE: 87 BPM

## 2020-08-21 DIAGNOSIS — M21.371 RIGHT FOOT DROP: Primary | ICD-10-CM

## 2020-08-21 DIAGNOSIS — Z96.651 HISTORY OF REVISION OF TOTAL REPLACEMENT OF RIGHT KNEE JOINT: Primary | ICD-10-CM

## 2020-08-21 DIAGNOSIS — M21.371 RIGHT FOOT DROP: ICD-10-CM

## 2020-08-21 PROCEDURE — 99024 POSTOP FOLLOW-UP VISIT: CPT | Performed by: ORTHOPAEDIC SURGERY

## 2020-08-21 NOTE — PROGRESS NOTES
[facility] Progress Note    Subjective: Patient returns to clinic 6 weeks postop from knee replacement. From the knee standpoint she is doing well. She is not taking any pain medications. She reports her range of motion is much better.   She denies however not improved subjectively from immediate postop. We discussed next steps in work-up and management. Would advise that we obtain nerve conduction studies/EMG, and would refer patient to my colleague Dr. Van Lala for this to be done.   Depending on r

## 2020-08-27 ENCOUNTER — TELEPHONE (OUTPATIENT)
Dept: INTERNAL MEDICINE CLINIC | Facility: CLINIC | Age: 65
End: 2020-08-27

## 2020-08-27 NOTE — TELEPHONE ENCOUNTER
Pt states that he daughter was at a party where the host tested positive for covid. Daughter has not been tested nor is symptomatic. Pt also denies all covid s/s or  travel in the last 30 days.     Advised pt that since she is not symptomatic and di

## 2020-08-27 NOTE — TELEPHONE ENCOUNTER
Pt would like to request an order for covid test states that her daughter went to a party and the host was covid positive and pt go's to eat her her daughter house because she just had surgery

## 2020-09-09 ENCOUNTER — TELEPHONE (OUTPATIENT)
Dept: NEUROLOGY | Facility: CLINIC | Age: 65
End: 2020-09-09

## 2020-10-02 ENCOUNTER — OFFICE VISIT (OUTPATIENT)
Dept: ORTHOPEDICS CLINIC | Facility: CLINIC | Age: 65
End: 2020-10-02
Payer: MEDICARE

## 2020-10-02 VITALS — OXYGEN SATURATION: 99 % | HEART RATE: 81 BPM

## 2020-10-02 DIAGNOSIS — M21.371 RIGHT FOOT DROP: ICD-10-CM

## 2020-10-02 DIAGNOSIS — Z96.651 HISTORY OF REVISION OF TOTAL REPLACEMENT OF RIGHT KNEE JOINT: Primary | ICD-10-CM

## 2020-10-02 PROCEDURE — 99024 POSTOP FOLLOW-UP VISIT: CPT | Performed by: ORTHOPAEDIC SURGERY

## 2020-10-02 NOTE — PROGRESS NOTES
[facility] Progress Note    Subjective: Patient returns to clinic 3 months postop from revision knee replacement. She states that the knee is doing well, she has good range of motion and no pain.   She states that in the morning the knee is a little significant improvement, and sensorimotor have improved in an order that would suggest progressive recovery of both SPN and DPN. She is scheduled to undergo EMG/NCS with Dr. Liu Lieberman next week, and I encouraged her to follow through with this.   We can use

## 2020-10-06 ENCOUNTER — TELEPHONE (OUTPATIENT)
Dept: INTERNAL MEDICINE CLINIC | Facility: CLINIC | Age: 65
End: 2020-10-06

## 2020-10-06 NOTE — TELEPHONE ENCOUNTER
The general guidelines are that routine pap smears are no longer indicated after age 72, as she turned 72 in May she should not need any further pap smears.   If she has vaginal bleeding or other vaginal symptoms then I would recommend evaluation by gynecol

## 2020-10-06 NOTE — TELEPHONE ENCOUNTER
Pt states that when she was seen 7/16/20 for her cpx, she did not get a pap. Pt wondering if she can come in for pap only with LL. Pt is asymptomatic and only looking for pap.      I explained that we can not see her for pap only but I can request if

## 2020-10-07 ENCOUNTER — PROCEDURE VISIT (OUTPATIENT)
Dept: NEUROLOGY | Facility: CLINIC | Age: 65
End: 2020-10-07
Payer: MEDICARE

## 2020-10-07 VITALS — HEIGHT: 65 IN | BODY MASS INDEX: 33.32 KG/M2 | WEIGHT: 200 LBS

## 2020-10-07 DIAGNOSIS — R29.898 RIGHT LEG WEAKNESS: ICD-10-CM

## 2020-10-07 PROCEDURE — 95909 NRV CNDJ TST 5-6 STUDIES: CPT | Performed by: PHYSICAL MEDICINE & REHABILITATION

## 2020-10-07 PROCEDURE — 95886 MUSC TEST DONE W/N TEST COMP: CPT | Performed by: PHYSICAL MEDICINE & REHABILITATION

## 2020-10-07 RX ORDER — LOSARTAN POTASSIUM 100 MG/1
TABLET ORAL
Qty: 90 TABLET | Refills: 0 | Status: SHIPPED | OUTPATIENT
Start: 2020-10-07 | End: 2020-12-30

## 2020-10-07 NOTE — TELEPHONE ENCOUNTER
Passed protocol     Last refill:  Entered historically Losartan 100 mg     LOV:   8/17/2020 Dr Vanessa Ordaz RTC PRN  No FOv scheduled

## 2020-10-09 NOTE — PROCEDURES
[address] [place] [address]  Phone: [phone]  Fax: [phone] REPORT          Patient: Randy Felty Hand Dominance:  right   Patient ID: [id number] Referring Dr:  Dr. Hector Varela longus, R. Tibialis posterior. • Abnormal interference pattern was found in R. Tibialis anterior, R. Extensor hallucis longus, R. Peroneus longus, R. Tibialis posterior.  There were nonsustained P waves noted on needle study of the tibialis posterior that 24.6 Calf - Ankle 10 101   R Superficial peroneal - Ankle      Lat leg Ankle 1.56 2.14 12.4 15.0 Lat leg - Ankle 14 90       EMG Summary Table     Spontaneous MUAP Recruitment   Muscle Nerve Roots IA Fib PSW Fasc H.F. Comments Amp Dur. PPP Pattern   R.  Tib

## 2020-10-14 ENCOUNTER — TELEPHONE (OUTPATIENT)
Dept: ORTHOPEDICS CLINIC | Facility: CLINIC | Age: 65
End: 2020-10-14

## 2020-10-14 NOTE — TELEPHONE ENCOUNTER
Patient called in requesting to receive \"test results\". Patient could not remember what kind of results she is seeking, patient could not clarify if it was regarding imaging or lab results.      Patient stated Dr. Jazmyn Goss should know what results she nee

## 2020-10-14 NOTE — TELEPHONE ENCOUNTER
Called patient, no response, left voicemail to approved phone number. Discussed results that Dr. Jone Rose noted.   After my discussion with Dr. Jone Rose, recommendation would be to continue to observe for improvement in function given the patient's improveme

## 2020-10-19 ENCOUNTER — TELEPHONE (OUTPATIENT)
Dept: SURGERY | Facility: CLINIC | Age: 65
End: 2020-10-19

## 2020-10-19 NOTE — TELEPHONE ENCOUNTER
Spoke with pt's daughter about scheduling appt with Dr. Meryl Dean. Pt's daughter stated that she will check in with pt and give us a call back if they want to schedule.

## 2020-10-27 ENCOUNTER — PATIENT MESSAGE (OUTPATIENT)
Dept: ORTHOPEDICS CLINIC | Facility: CLINIC | Age: 65
End: 2020-10-27

## 2020-10-27 NOTE — TELEPHONE ENCOUNTER
Patient returned call from [facility]. Rescheduled appointment for 11/30/20 with Dr. Lurdes Gallo.

## 2020-11-06 ENCOUNTER — TELEPHONE (OUTPATIENT)
Dept: INTERNAL MEDICINE CLINIC | Facility: CLINIC | Age: 65
End: 2020-11-06

## 2020-11-06 NOTE — TELEPHONE ENCOUNTER
Dr Genesis Walker dentist calling for knee surgery information from July 2020 from patient's pcp;faxing over information request to fax back to his office please advise when received/fax

## 2020-11-07 NOTE — TELEPHONE ENCOUNTER
Janice requesting refill for:  calcitRIOL 0.5 MCG Oral Cap  LEVOTHYROXINE SODIUM 150 MCG Oral Tab     [id number] - [place] AT [address] [place] [address], [phone], [phone]

## 2020-11-09 RX ORDER — LEVOTHYROXINE SODIUM 150 UG/1
150 TABLET ORAL
Qty: 90 TABLET | Refills: 1 | Status: SHIPPED | OUTPATIENT
Start: 2020-11-09 | End: 2021-03-25 | Stop reason: DRUGHIGH

## 2020-11-09 RX ORDER — CALCITRIOL 0.5 UG/1
0.5 CAPSULE, LIQUID FILLED ORAL DAILY
Qty: 90 CAPSULE | Refills: 1 | Status: SHIPPED | OUTPATIENT
Start: 2020-11-09 | End: 2021-09-08

## 2020-11-09 NOTE — TELEPHONE ENCOUNTER
Form received and placed in your inbasket. No FOV scheduled with you.  LOV: 8/17/2020 Dr Jaxson Sunshine

## 2020-11-10 ENCOUNTER — TELEPHONE (OUTPATIENT)
Dept: ORTHOPEDICS CLINIC | Facility: CLINIC | Age: 65
End: 2020-11-10

## 2020-11-10 NOTE — TELEPHONE ENCOUNTER
Patient's daughter called in stating that the patient went in to the dentist on 11.07.2020 and they advised her that she has a really bad infection inside of her mouth.      They advised her to check with Stephen's office since she had knee sx on 07.06.202

## 2020-11-11 NOTE — TELEPHONE ENCOUNTER
daughter just wanted was just telling us about the medication usage she is scheduled to use medication until Saturday .  I did tell her that at this point Serenity Angel is leaving the management for antibiotics up to the dental office per Serenity Angel and Yolanda Morgan

## 2020-11-11 NOTE — TELEPHONE ENCOUNTER
She should stay on antibiotics until her dental infection is gone. Whatever dental work she needs to have done to get rid of the infection, she should have done as soon as she can if it is really that bad.  But she should also take antibiotics prior to dent

## 2020-11-13 ENCOUNTER — TELEPHONE (OUTPATIENT)
Dept: INTERNAL MEDICINE CLINIC | Facility: CLINIC | Age: 65
End: 2020-11-13

## 2020-11-13 NOTE — TELEPHONE ENCOUNTER
Received fax form for dental work from [email or web address] -   Dr Abiola Malin completed form and I faxed back to [phone].

## 2020-11-30 ENCOUNTER — OFFICE VISIT (OUTPATIENT)
Dept: ORTHOPEDICS CLINIC | Facility: CLINIC | Age: 65
End: 2020-11-30
Payer: MEDICARE

## 2020-11-30 VITALS — HEART RATE: 76 BPM | OXYGEN SATURATION: 99 %

## 2020-11-30 DIAGNOSIS — M21.371 RIGHT FOOT DROP: ICD-10-CM

## 2020-11-30 DIAGNOSIS — Z96.651 HISTORY OF REVISION OF TOTAL REPLACEMENT OF RIGHT KNEE JOINT: Primary | ICD-10-CM

## 2020-11-30 PROCEDURE — 99213 OFFICE O/P EST LOW 20 MIN: CPT | Performed by: ORTHOPAEDIC SURGERY

## 2020-11-30 NOTE — PROGRESS NOTES
[facility] Progress Note    Subjective: Patient returns to clinic 5 months status post revision right knee replacement for stiffness and pain. She states that the knee is doing better than it was prior to surgery.   Her motion is much better, and federico asking about when pain will be completely alleviated.   I did  the patient that, especially after revision knee replacement, the joint may not ever be completely 100% pain-free especially given where she started when she first saw me with this issue,

## 2020-12-09 ENCOUNTER — MED REC SCAN ONLY (OUTPATIENT)
Dept: ORTHOPEDICS CLINIC | Facility: CLINIC | Age: 65
End: 2020-12-09

## 2020-12-28 ENCOUNTER — TELEPHONE (OUTPATIENT)
Dept: INTERNAL MEDICINE CLINIC | Facility: CLINIC | Age: 65
End: 2020-12-28

## 2020-12-28 NOTE — TELEPHONE ENCOUNTER
Patient calling states she is having headache, fatigue, chills, feels feverish but has not taken temp. Two of patient's friends at house tested positive yesterday. Would like call back from nurse.

## 2020-12-30 ENCOUNTER — TELEPHONE (OUTPATIENT)
Dept: ORTHOPEDICS CLINIC | Facility: CLINIC | Age: 65
End: 2020-12-30

## 2020-12-30 RX ORDER — CELECOXIB 200 MG/1
200 CAPSULE ORAL DAILY
Qty: 90 CAPSULE | Refills: 0 | Status: SHIPPED | OUTPATIENT
Start: 2020-12-30 | End: 2021-03-03

## 2020-12-30 RX ORDER — LOSARTAN POTASSIUM 100 MG/1
TABLET ORAL
Qty: 90 TABLET | Refills: 0 | Status: SHIPPED | OUTPATIENT
Start: 2020-12-30 | End: 2021-04-08

## 2020-12-30 NOTE — TELEPHONE ENCOUNTER
Called patient to verify. Patient would like a call when RX is sent to the pharmacy. Not when its ready for . When filled please send back to me to call to inform him.

## 2020-12-30 NOTE — TELEPHONE ENCOUNTER
Patient Called regarding medication refill for CELECOXIB 200 MG . She states that she really needs the medication since she was told her medication request was put in a month ago . Please call at [phone] when medication refill is ready for .

## 2020-12-30 NOTE — TELEPHONE ENCOUNTER
Passed protocol     Last refill:  10/7/2020 Losartan 100 mg #90 NR    LOV:   8/17/2020 Dr Gabi Luu RTC PRN  No FOV scheduled

## 2021-01-15 RX ORDER — ALPRAZOLAM 0.25 MG/1
TABLET ORAL
Qty: 60 TABLET | Refills: 0 | Status: SHIPPED | OUTPATIENT
Start: 2021-01-15

## 2021-01-15 NOTE — TELEPHONE ENCOUNTER
No protocol     Last refill:  3/2/2020 Alprazolam 0.25 mg #90 1R    LOV:   8/17/2020 Dr Julia Rodriguez RTC PRN  No FOV scheduled

## 2021-01-26 DIAGNOSIS — M15.9 PRIMARY OSTEOARTHRITIS INVOLVING MULTIPLE JOINTS: ICD-10-CM

## 2021-01-26 DIAGNOSIS — M47.816 SPONDYLOSIS OF LUMBAR REGION WITHOUT MYELOPATHY OR RADICULOPATHY: ICD-10-CM

## 2021-01-27 RX ORDER — CYCLOBENZAPRINE HCL 10 MG
10 TABLET ORAL 3 TIMES DAILY PRN
Qty: 30 TABLET | Refills: 1 | Status: SHIPPED | OUTPATIENT
Start: 2021-01-27 | End: 2021-02-05

## 2021-01-27 NOTE — TELEPHONE ENCOUNTER
LOV: 8/17/2020 with Dr. Lois Jolly  RTC: \"PRN or at next regularly scheduled OV\"  Last Relevant Labs: 7/18/2020   Filled: 9/19/2019  #30 with 1 refill    Future Appointments   Date Time Provider Oc Campos   2/8/2021  2:30 PM Cristopher Muñiz MD [facility]

## 2021-01-29 ENCOUNTER — TELEPHONE (OUTPATIENT)
Dept: INTERNAL MEDICINE CLINIC | Facility: CLINIC | Age: 66
End: 2021-01-29

## 2021-01-29 DIAGNOSIS — M15.9 PRIMARY OSTEOARTHRITIS INVOLVING MULTIPLE JOINTS: ICD-10-CM

## 2021-01-29 DIAGNOSIS — M47.816 SPONDYLOSIS OF LUMBAR REGION WITHOUT MYELOPATHY OR RADICULOPATHY: ICD-10-CM

## 2021-01-29 NOTE — TELEPHONE ENCOUNTER
Received PA request from [facility] for Cyclobenzaprine HCL 10mg tabs. Okay to submit PA, please advise?        KEY#: [id number]

## 2021-02-01 NOTE — TELEPHONE ENCOUNTER
Spoke to pt and informed of need for PA on medication. Pt is also requesting appt. Scheduled. PA form placed in your inbasket for completion.

## 2021-02-05 RX ORDER — CYCLOBENZAPRINE HCL 10 MG
10 TABLET ORAL 3 TIMES DAILY PRN
Qty: 30 TABLET | Refills: 1 | Status: SHIPPED | OUTPATIENT
Start: 2021-02-05 | End: 2021-02-15

## 2021-02-05 NOTE — TELEPHONE ENCOUNTER
Received denial letter. Contacted pt to inform. Pt is going to use good rx coupon at [facility] refill to diff/new pharmacy.

## 2021-02-15 RX ORDER — CYCLOBENZAPRINE HCL 10 MG
10 TABLET ORAL 3 TIMES DAILY PRN
Qty: 30 TABLET | Refills: 1 | Status: SHIPPED | OUTPATIENT
Start: 2021-02-15 | End: 2021-06-07

## 2021-02-15 NOTE — TELEPHONE ENCOUNTER
Rx pended to [facility] for pt to use with good rx coupon. Last rx was sent to [facility].

## 2021-03-03 ENCOUNTER — OFFICE VISIT (OUTPATIENT)
Dept: INTERNAL MEDICINE CLINIC | Facility: CLINIC | Age: 66
End: 2021-03-03
Payer: MEDICARE

## 2021-03-03 VITALS
HEART RATE: 92 BPM | TEMPERATURE: 98 F | SYSTOLIC BLOOD PRESSURE: 130 MMHG | DIASTOLIC BLOOD PRESSURE: 76 MMHG | OXYGEN SATURATION: 98 % | BODY MASS INDEX: 32.18 KG/M2 | RESPIRATION RATE: 14 BRPM | HEIGHT: 67 IN | WEIGHT: 205 LBS

## 2021-03-03 DIAGNOSIS — F41.9 ANXIETY: ICD-10-CM

## 2021-03-03 DIAGNOSIS — M54.50 ACUTE BILATERAL LOW BACK PAIN WITHOUT SCIATICA: Primary | ICD-10-CM

## 2021-03-03 DIAGNOSIS — Z96.659 HISTORY OF REVISION OF TOTAL KNEE ARTHROPLASTY: ICD-10-CM

## 2021-03-03 DIAGNOSIS — F33.0 MILD EPISODE OF RECURRENT MAJOR DEPRESSIVE DISORDER (HCC): ICD-10-CM

## 2021-03-03 PROCEDURE — 99214 OFFICE O/P EST MOD 30 MIN: CPT | Performed by: PHYSICIAN ASSISTANT

## 2021-03-03 RX ORDER — AMOXICILLIN 500 MG/1
CAPSULE ORAL
COMMUNITY
Start: 2021-02-08 | End: 2021-06-07

## 2021-03-03 RX ORDER — MELOXICAM 15 MG/1
15 TABLET ORAL DAILY PRN
Qty: 30 TABLET | Refills: 0 | Status: SHIPPED | OUTPATIENT
Start: 2021-03-03 | End: 2021-11-11

## 2021-03-03 RX ORDER — LEVOTHYROXINE SODIUM 175 MCG
175 TABLET ORAL
COMMUNITY
Start: 2021-01-21

## 2021-03-03 NOTE — PROGRESS NOTES
HPI:   Ramos Ray is a 72year old female who is here for complaints of back pain for the last couple weeks. C/o bilat LBP which radiates to both hips. Denies LE numbness, tingling, weakness, loss of bowel/bladder control.   Pain is worse when bendin [facility]   • KNEE TOTAL REVISION Right 7/7/2020    Performed by Aidan Phillips MD at [facility] MAIN OR   • BRAD LOCALIZATION WIRE 1 SITE LEFT (CPT=19281)  AGE 25    CLOGGED DUCT   • OTHER SURGICAL HISTORY  1/1/2005    thyroid surgery XRT   • OTHER SURGICAL Flexeril, activity modification, HEP handout given. # S/p R TKA revision: with increased pain and stiffness recently. May be d/t being off celebrex. Will start meloxicam and f/u with ortho. Hx of need for CONNIE following first TKA.   # Anxiety, MDD: refer

## 2021-03-03 NOTE — PATIENT INSTRUCTIONS
Back pain:  - start meloxicam 15 mg - take 1 tablet once a day with food (do not combine this with advil, motrin, ibuprofen, aleve, naproxen, or celebrex)  - continue muscle relaxer (cyclobenzaprine) - 1 tablet three times a day as needed  - ice / heat (10

## 2021-03-05 DIAGNOSIS — Z23 NEED FOR VACCINATION: ICD-10-CM

## 2021-03-25 ENCOUNTER — OFFICE VISIT (OUTPATIENT)
Dept: INTERNAL MEDICINE CLINIC | Facility: CLINIC | Age: 66
End: 2021-03-25
Payer: MEDICARE

## 2021-03-25 VITALS
BODY MASS INDEX: 31.73 KG/M2 | HEIGHT: 67 IN | DIASTOLIC BLOOD PRESSURE: 62 MMHG | WEIGHT: 202.19 LBS | SYSTOLIC BLOOD PRESSURE: 124 MMHG | OXYGEN SATURATION: 99 % | HEART RATE: 68 BPM | TEMPERATURE: 98 F | RESPIRATION RATE: 16 BRPM

## 2021-03-25 DIAGNOSIS — R21 RASH: Primary | ICD-10-CM

## 2021-03-25 DIAGNOSIS — M79.621 AXILLARY PAIN, RIGHT: ICD-10-CM

## 2021-03-25 PROCEDURE — 99213 OFFICE O/P EST LOW 20 MIN: CPT | Performed by: PHYSICIAN ASSISTANT

## 2021-03-25 NOTE — PROGRESS NOTES
Luis M Burr is a 72year old female. HPI:   Patient presents for eval of a rash which began last week.   C/o itchy red lesions which began on bilat arms and in the last couple days have spread to chest.  Notes that rash began the day after she drank thyroid CA 10 yrs ago   • Esophageal reflux    • High blood pressure    • High cholesterol    • Kidney stone    • Normal delivery     x3   • Osteoarthritis    • Other screening mammogram 2/07/2012   • Other screening mammogram 1/17/2011   • Pain in joint, denies headaches, dizziness, LH  EXT: denies edema    EXAM:   /62   Pulse 68   Temp 97.9 °F (36.6 °C) (Oral)   Resp 16   Ht 5' 7\" (1.702 m)   Wt 202 lb 3.2 oz (91.7 kg)   SpO2 99%   BMI 31.67 kg/m²   GENERAL: well developed, well nourished, in no ac

## 2021-03-25 NOTE — PATIENT INSTRUCTIONS
Rash:  - start triamcinolone cream - thin layer applied twice a day as needed  - over the counter anti-histamine such as Claritin, Allegra, or Zyrtec    Pain under armpit:  - warm compresses (10-15 minutes at a time)  - ibuprofen as needed

## 2021-03-31 ENCOUNTER — OFFICE VISIT (OUTPATIENT)
Dept: ORTHOPEDICS CLINIC | Facility: CLINIC | Age: 66
End: 2021-03-31
Payer: MEDICARE

## 2021-03-31 ENCOUNTER — HOSPITAL ENCOUNTER (OUTPATIENT)
Dept: GENERAL RADIOLOGY | Age: 66
Discharge: HOME | End: 2021-03-31
Attending: ORTHOPAEDIC SURGERY
Payer: MEDICARE

## 2021-03-31 VITALS — OXYGEN SATURATION: 96 % | HEART RATE: 83 BPM

## 2021-03-31 DIAGNOSIS — Z96.651 HISTORY OF REVISION OF TOTAL REPLACEMENT OF RIGHT KNEE JOINT: ICD-10-CM

## 2021-03-31 DIAGNOSIS — Z96.651 PAIN DUE TO TOTAL RIGHT KNEE REPLACEMENT, INITIAL ENCOUNTER (HCC): Primary | ICD-10-CM

## 2021-03-31 DIAGNOSIS — T84.84XA PAIN DUE TO TOTAL RIGHT KNEE REPLACEMENT, INITIAL ENCOUNTER (HCC): Primary | ICD-10-CM

## 2021-03-31 PROCEDURE — 73562 X-RAY EXAM OF KNEE 3: CPT | Performed by: ORTHOPAEDIC SURGERY

## 2021-03-31 PROCEDURE — 99213 OFFICE O/P EST LOW 20 MIN: CPT | Performed by: ORTHOPAEDIC SURGERY

## 2021-03-31 RX ORDER — DICLOFENAC SODIUM 75 MG/1
75 TABLET, DELAYED RELEASE ORAL 2 TIMES DAILY
Qty: 60 TABLET | Refills: 0 | Status: SHIPPED | OUTPATIENT
Start: 2021-03-31 | End: 2021-04-30

## 2021-03-31 NOTE — PROGRESS NOTES
[facility] Progress Note    Subjective: Patient returns for complaint of pain and swelling in her right knee. She states that she had an issue with a root canal about 1 month ago.   She reports that she did not take any antibiotics prior to dental wo completed. She is asking for an alternative anti-inflammatory medication. She has been taking meloxicam but feels this has not been helping. I did order diclofenac for her. Advised her to stop taking meloxicam if she takes diclofenac.     Lis Middleton,

## 2021-04-01 ENCOUNTER — LAB ENCOUNTER (OUTPATIENT)
Dept: LAB | Age: 66
End: 2021-04-01
Attending: ORTHOPAEDIC SURGERY
Payer: MEDICARE

## 2021-04-01 DIAGNOSIS — Z96.651 PAIN DUE TO TOTAL RIGHT KNEE REPLACEMENT, INITIAL ENCOUNTER (HCC): ICD-10-CM

## 2021-04-01 DIAGNOSIS — T84.84XA PAIN DUE TO TOTAL RIGHT KNEE REPLACEMENT, INITIAL ENCOUNTER (HCC): ICD-10-CM

## 2021-04-01 PROCEDURE — 36415 COLL VENOUS BLD VENIPUNCTURE: CPT

## 2021-04-01 PROCEDURE — 86140 C-REACTIVE PROTEIN: CPT

## 2021-04-01 PROCEDURE — 85652 RBC SED RATE AUTOMATED: CPT

## 2021-04-08 RX ORDER — LOSARTAN POTASSIUM 100 MG/1
TABLET ORAL
Qty: 90 TABLET | Refills: 0 | Status: SHIPPED | OUTPATIENT
Start: 2021-04-08 | End: 2021-07-20

## 2021-04-08 NOTE — TELEPHONE ENCOUNTER
Passed protocol     Last refill:  12/30/2020 Losartan 100 mg #90 NR    LOV:   3/3/21 Ignacio Harris RT  No FOV scheduled

## 2021-05-01 ENCOUNTER — LAB ENCOUNTER (OUTPATIENT)
Dept: LAB | Age: 66
End: 2021-05-01
Attending: PHYSICIAN ASSISTANT
Payer: MEDICARE

## 2021-05-01 ENCOUNTER — OFFICE VISIT (OUTPATIENT)
Dept: INTERNAL MEDICINE CLINIC | Facility: CLINIC | Age: 66
End: 2021-05-01
Payer: MEDICARE

## 2021-05-01 VITALS
WEIGHT: 205.63 LBS | HEART RATE: 88 BPM | SYSTOLIC BLOOD PRESSURE: 126 MMHG | BODY MASS INDEX: 32.27 KG/M2 | TEMPERATURE: 98 F | HEIGHT: 67 IN | OXYGEN SATURATION: 99 % | DIASTOLIC BLOOD PRESSURE: 78 MMHG | RESPIRATION RATE: 16 BRPM

## 2021-05-01 DIAGNOSIS — I10 BENIGN ESSENTIAL HTN: ICD-10-CM

## 2021-05-01 DIAGNOSIS — F43.9 STRESS: ICD-10-CM

## 2021-05-01 DIAGNOSIS — L29.9 ITCHING: ICD-10-CM

## 2021-05-01 DIAGNOSIS — R21 RASH: Primary | ICD-10-CM

## 2021-05-01 DIAGNOSIS — R21 RASH: ICD-10-CM

## 2021-05-01 PROCEDURE — 80053 COMPREHEN METABOLIC PANEL: CPT

## 2021-05-01 PROCEDURE — 84443 ASSAY THYROID STIM HORMONE: CPT

## 2021-05-01 PROCEDURE — 36415 COLL VENOUS BLD VENIPUNCTURE: CPT

## 2021-05-01 PROCEDURE — 85025 COMPLETE CBC W/AUTO DIFF WBC: CPT

## 2021-05-01 PROCEDURE — 84481 FREE ASSAY (FT-3): CPT

## 2021-05-01 PROCEDURE — 84439 ASSAY OF FREE THYROXINE: CPT

## 2021-05-01 PROCEDURE — 99214 OFFICE O/P EST MOD 30 MIN: CPT | Performed by: PHYSICIAN ASSISTANT

## 2021-05-01 NOTE — PATIENT INSTRUCTIONS
Blood work today.     Rash:  - may continue triamcinolone cream - thin layer twice a day as needed  - start an over the counter antihistamine such as Claritin  - follow up with dermatology    Blood pressure:  - continue losartan 100 mg daily  - minimize str

## 2021-05-01 NOTE — PROGRESS NOTES
Ana María Barrera is a 72year old female. HPI:   Patient presents for rash x 6 weeks. Initially began after having a new \"immune support\" drink which was thought to be cause but she has stopped drinking this and rash persists.   C/o pink/red raised it • Other screening mammogram 2/07/2012   • Other screening mammogram 1/17/2011   • Pain in joint, ankle and foot 9/14/2011   • Personal history of malignant neoplasm of thyroid 11/17/2011   • Personal history of urinary (tract) infection 7/21/2011   • HUSSAIN lateral aspect of R elbow) where pt notes she previously had a similar rash (now improved)  HEENT: normocephalic, atraumatic, conjunctiva clear  NECK: supple, no thyromegaly, no lymphadenopathy  LUNGS: regular breathing rate and effort, clear to auscultati

## 2021-06-07 ENCOUNTER — OFFICE VISIT (OUTPATIENT)
Dept: INTERNAL MEDICINE CLINIC | Facility: CLINIC | Age: 66
End: 2021-06-07
Payer: MEDICARE

## 2021-06-07 VITALS
TEMPERATURE: 98 F | OXYGEN SATURATION: 99 % | RESPIRATION RATE: 20 BRPM | HEIGHT: 67 IN | HEART RATE: 75 BPM | WEIGHT: 208.63 LBS | SYSTOLIC BLOOD PRESSURE: 132 MMHG | DIASTOLIC BLOOD PRESSURE: 78 MMHG | BODY MASS INDEX: 32.74 KG/M2

## 2021-06-07 DIAGNOSIS — I10 BENIGN ESSENTIAL HTN: Chronic | ICD-10-CM

## 2021-06-07 DIAGNOSIS — M15.9 PRIMARY OSTEOARTHRITIS INVOLVING MULTIPLE JOINTS: Chronic | ICD-10-CM

## 2021-06-07 DIAGNOSIS — R21 RASH AND NONSPECIFIC SKIN ERUPTION: Primary | ICD-10-CM

## 2021-06-07 DIAGNOSIS — E89.0 POSTOPERATIVE HYPOTHYROIDISM: Chronic | ICD-10-CM

## 2021-06-07 DIAGNOSIS — M85.89 OSTEOPENIA OF MULTIPLE SITES: Chronic | ICD-10-CM

## 2021-06-07 DIAGNOSIS — M54.50 ACUTE BILATERAL LOW BACK PAIN WITHOUT SCIATICA: ICD-10-CM

## 2021-06-07 PROCEDURE — 99214 OFFICE O/P EST MOD 30 MIN: CPT | Performed by: INTERNAL MEDICINE

## 2021-06-07 RX ORDER — BETAMETHASONE DIPROPIONATE 0.05 %
1 OINTMENT (GRAM) TOPICAL 2 TIMES DAILY
Qty: 45 G | Refills: 0 | Status: SHIPPED | OUTPATIENT
Start: 2021-06-07

## 2021-06-07 RX ORDER — CYCLOBENZAPRINE HCL 10 MG
10 TABLET ORAL 3 TIMES DAILY PRN
Qty: 30 TABLET | Refills: 1 | Status: SHIPPED | OUTPATIENT
Start: 2021-06-07

## 2021-06-07 NOTE — PROGRESS NOTES
Brenda Villarreal is a 77year old female. HPI:   Patient presents with:  Rash: Follow up from last visit with Nikolay Renae. States rash/itching is back. She says sometimes it goes away.  Triamcinolone/Claritin did not work and she did not follow up with [facility] Outpatient Medications:   •  cyclobenzaprine 10 MG Oral Tab, Take 1 tablet (10 mg total) by mouth 3 (three) times daily as needed for Muscle spasms. , Disp: 30 tablet, Rfl: 1  •  Betamethasone Dipropionate 0.05 % External Ointment, Apply 1 g topically 2 (tw kg)  05/01/21 : 205 lb 9.6 oz (93.3 kg)  03/25/21 : 202 lb 3.2 oz (91.7 kg)  03/03/21 : 205 lb (93 kg)  10/07/20 : 200 lb (90.7 kg)  08/17/20 : 201 lb 12.8 oz (91.5 kg)    EXAM:   /78 (BP Location: Left arm, Patient Position: Sitting, Cuff Size: adul Will have her split losartan dose to BID dosing - 50 mg AM, 50 mg PM.  Advised her to let us know if this helps her symptoms. 4. Postoperative hypothyroidism  No new symptoms.   TSH slightly low last month, free T4 normal.  Continue Synthroid 175 mcg qA

## 2021-06-07 NOTE — PATIENT INSTRUCTIONS
- Cyclobenzaprine (Flexeril) refilled for back pain. Use [employer] card when you fill the prescription.  - We will try a different steroid cream, betamethasone.   Use twice daily for next 1-2 weeks on areas of rash/itching.  - If this does not help your rash,

## 2021-06-08 PROBLEM — F33.0 MILD EPISODE OF RECURRENT MAJOR DEPRESSIVE DISORDER (HCC): Chronic | Status: ACTIVE | Noted: 2021-03-03

## 2021-06-08 PROBLEM — F33.0 MILD EPISODE OF RECURRENT MAJOR DEPRESSIVE DISORDER: Chronic | Status: ACTIVE | Noted: 2021-03-03

## 2021-07-06 ENCOUNTER — TELEPHONE (OUTPATIENT)
Dept: ORTHOPEDICS CLINIC | Facility: CLINIC | Age: 66
End: 2021-07-06

## 2021-07-06 DIAGNOSIS — Z96.651 HISTORY OF REVISION OF TOTAL REPLACEMENT OF RIGHT KNEE JOINT: Primary | ICD-10-CM

## 2021-07-07 ENCOUNTER — OFFICE VISIT (OUTPATIENT)
Dept: ORTHOPEDICS CLINIC | Facility: CLINIC | Age: 66
End: 2021-07-07
Payer: MEDICARE

## 2021-07-07 ENCOUNTER — HOSPITAL ENCOUNTER (OUTPATIENT)
Dept: GENERAL RADIOLOGY | Age: 66
Discharge: HOME | End: 2021-07-07
Attending: ORTHOPAEDIC SURGERY
Payer: MEDICARE

## 2021-07-07 VITALS — HEART RATE: 75 BPM | OXYGEN SATURATION: 95 %

## 2021-07-07 DIAGNOSIS — Z96.651 HISTORY OF REVISION OF TOTAL REPLACEMENT OF RIGHT KNEE JOINT: ICD-10-CM

## 2021-07-07 DIAGNOSIS — Z96.651 HISTORY OF REVISION OF TOTAL REPLACEMENT OF RIGHT KNEE JOINT: Primary | ICD-10-CM

## 2021-07-07 PROCEDURE — 73562 X-RAY EXAM OF KNEE 3: CPT | Performed by: ORTHOPAEDIC SURGERY

## 2021-07-07 PROCEDURE — 99213 OFFICE O/P EST LOW 20 MIN: CPT | Performed by: ORTHOPAEDIC SURGERY

## 2021-07-07 RX ORDER — MELOXICAM 15 MG/1
15 TABLET ORAL DAILY PRN
Qty: 30 TABLET | Refills: 0 | Status: SHIPPED | OUTPATIENT
Start: 2021-07-07

## 2021-07-07 NOTE — PROGRESS NOTES
[facility] Progress Note    Subjective: Patient returns 1 year postop from revision right total knee arthroplasty. Reports she is doing better than she was prior to surgery, but does note that she still gets pain in the knee.   She was last seen 3 mo pain and swelling, however does state that she gets good relief with meloxicam.  We previously worked up for infection with inflammatory markers and this was negative.   She does have symptoms radiating up the thigh as well as down to bilateral ankles, with

## 2021-07-17 DIAGNOSIS — I10 BENIGN ESSENTIAL HTN: Primary | ICD-10-CM

## 2021-07-19 NOTE — TELEPHONE ENCOUNTER
LOV: 6/7/2021 with Dr. Azra Luo   RTC: 1-2 months  Last Relevant Labs: 5/1/2021   Filled: 4/8/2021   #90  with 0 refills    Future Appointments   Date Time Provider Oc Campos   7/22/2021  3:00 PM Nerissa Doyle MD [facility] [place]   2/14/2022 10

## 2021-07-20 RX ORDER — LOSARTAN POTASSIUM 100 MG/1
100 TABLET ORAL DAILY
Qty: 90 TABLET | Refills: 1 | Status: SHIPPED | OUTPATIENT
Start: 2021-07-20 | End: 2022-01-12

## 2021-07-22 ENCOUNTER — OFFICE VISIT (OUTPATIENT)
Dept: INTERNAL MEDICINE CLINIC | Facility: CLINIC | Age: 66
End: 2021-07-22
Payer: MEDICARE

## 2021-07-22 VITALS
HEART RATE: 84 BPM | HEIGHT: 67 IN | SYSTOLIC BLOOD PRESSURE: 124 MMHG | WEIGHT: 209.38 LBS | RESPIRATION RATE: 20 BRPM | DIASTOLIC BLOOD PRESSURE: 68 MMHG | TEMPERATURE: 98 F | OXYGEN SATURATION: 95 % | BODY MASS INDEX: 32.86 KG/M2

## 2021-07-22 DIAGNOSIS — Z00.00 ENCOUNTER FOR SUBSEQUENT ANNUAL WELLNESS VISIT (AWV) IN MEDICARE PATIENT: Primary | ICD-10-CM

## 2021-07-22 DIAGNOSIS — E55.9 VITAMIN D DEFICIENCY: ICD-10-CM

## 2021-07-22 DIAGNOSIS — F33.0 MILD EPISODE OF RECURRENT MAJOR DEPRESSIVE DISORDER (HCC): Chronic | ICD-10-CM

## 2021-07-22 DIAGNOSIS — F41.1 GAD (GENERALIZED ANXIETY DISORDER): Chronic | ICD-10-CM

## 2021-07-22 DIAGNOSIS — L29.9 ITCHING: ICD-10-CM

## 2021-07-22 DIAGNOSIS — M54.50 ACUTE BILATERAL LOW BACK PAIN WITHOUT SCIATICA: ICD-10-CM

## 2021-07-22 DIAGNOSIS — I10 BENIGN ESSENTIAL HTN: ICD-10-CM

## 2021-07-22 DIAGNOSIS — M15.9 PRIMARY OSTEOARTHRITIS INVOLVING MULTIPLE JOINTS: Chronic | ICD-10-CM

## 2021-07-22 DIAGNOSIS — E89.0 POSTOPERATIVE HYPOTHYROIDISM: ICD-10-CM

## 2021-07-22 DIAGNOSIS — Z12.31 ENCOUNTER FOR SCREENING MAMMOGRAM FOR MALIGNANT NEOPLASM OF BREAST: ICD-10-CM

## 2021-07-22 DIAGNOSIS — K21.9 GASTROESOPHAGEAL REFLUX DISEASE WITHOUT ESOPHAGITIS: Chronic | ICD-10-CM

## 2021-07-22 DIAGNOSIS — M85.89 OSTEOPENIA OF MULTIPLE SITES: ICD-10-CM

## 2021-07-22 DIAGNOSIS — Z12.11 SCREENING FOR MALIGNANT NEOPLASM OF COLON: ICD-10-CM

## 2021-07-22 DIAGNOSIS — R21 RASH: ICD-10-CM

## 2021-07-22 PROCEDURE — 99214 OFFICE O/P EST MOD 30 MIN: CPT | Performed by: INTERNAL MEDICINE

## 2021-07-22 PROCEDURE — G0439 PPPS, SUBSEQ VISIT: HCPCS | Performed by: INTERNAL MEDICINE

## 2021-07-22 RX ORDER — METHYLPREDNISOLONE 4 MG/1
TABLET ORAL
Qty: 21 EACH | Refills: 0 | Status: SHIPPED | OUTPATIENT
Start: 2021-07-22 | End: 2021-09-08 | Stop reason: ALTCHOICE

## 2021-07-22 NOTE — PROGRESS NOTES
HPI:   Antonieta Dwyer is a 77year old female who presents for a Medicare Subsequent Annual Wellness visit (Pt already had Initial Annual Wellness) and follow up. Her last annual assessment has been over 1 year: Annual Physical due on 07/16/2021.   Preven available to patient in AVS       She has never smoked tobacco.    CAGE screening score of 0 on 7/22/2021, showing low risk of alcohol abuse.         Patient Care Team: Patient Care Team:  Jeff Roldan MD as PCP - General (Internal Medicine)  Denny Vega mg total) by mouth daily. cyclobenzaprine 10 MG Oral Tab, Take 1 tablet (10 mg total) by mouth 3 (three) times daily as needed for Muscle spasms. Betamethasone Dipropionate 0.05 % External Ointment, Apply 1 g topically 2 (two) times daily.   SYNTHROID 175 exertion  GI:GERD  : denies dysuria, vaginal discharge or itching, no complaint of urinary incontinence   MUSCULOSKELETAL: chronic knee pain; acute lower back pain, right leg pain  NEURO: denies headaches  PSYCHE: anxiety and depression, stable  HEMATOLO 01/22/2016        ASSESSMENT AND OTHER RELEVANT CHRONIC CONDITIONS:   Maryjane Carreno is a 77year old female who presents for a Medicare Assessment. PLAN SUMMARY:   1. Encounter for subsequent annual wellness visit (AWV) in Medicare patient  2.  Encoun worsening. Will do a Medrol dose pack to see if this helps. I also recommended that she follow up with dermatology – information provided.      14. Acute bilateral low back pain without sciatica  As with right leg, she's been dealing with low back pain ever beneficiaries without apparent signs or symptoms of cardiovascular disease Lab Results   Component Value Date    CHOLEST 172 08/19/2019    HDL 62 (H) 08/19/2019    LDL 95 08/19/2019    TRIG 75 08/19/2019         Electrocardiogram (EKG)   Covered if needed Hepatitis B One screening covered for patients with certain risk factors   -  No recommendations at this time    Tetanus Toxoid Not covered by Medicare Part B unless medically necessary (cut with metal); may be covered with your pharmacy prescription benef

## 2021-07-25 PROBLEM — H81.13 BENIGN PAROXYSMAL POSITIONAL VERTIGO DUE TO BILATERAL VESTIBULAR DISORDER: Status: RESOLVED | Noted: 2018-02-20 | Resolved: 2021-07-25

## 2021-08-16 ENCOUNTER — LAB ENCOUNTER (OUTPATIENT)
Dept: LAB | Age: 66
End: 2021-08-16
Attending: INTERNAL MEDICINE
Payer: MEDICARE

## 2021-08-16 DIAGNOSIS — Z98.890 HISTORY OF COLONOSCOPY: ICD-10-CM

## 2021-08-17 LAB — SARS-COV-2 RNA RESP QL NAA+PROBE: NOT DETECTED

## 2021-08-19 PROBLEM — Z86.010 PERSONAL HISTORY OF COLONIC POLYPS: Status: ACTIVE | Noted: 2021-08-19

## 2021-08-19 PROBLEM — K57.30 DIVERTICULOSIS OF COLON: Status: ACTIVE | Noted: 2021-08-19

## 2021-08-19 PROBLEM — K64.8 INTERNAL HEMORRHOIDS: Status: ACTIVE | Noted: 2021-08-19

## 2021-08-19 PROBLEM — Z86.0100 PERSONAL HISTORY OF COLONIC POLYPS: Status: ACTIVE | Noted: 2021-08-19

## 2021-08-21 ENCOUNTER — LAB ENCOUNTER (OUTPATIENT)
Dept: LAB | Age: 66
End: 2021-08-21
Attending: INTERNAL MEDICINE
Payer: MEDICARE

## 2021-08-21 DIAGNOSIS — M85.89 OSTEOPENIA OF MULTIPLE SITES: ICD-10-CM

## 2021-08-21 DIAGNOSIS — E89.0 POSTOPERATIVE HYPOTHYROIDISM: ICD-10-CM

## 2021-08-21 DIAGNOSIS — I10 BENIGN ESSENTIAL HTN: ICD-10-CM

## 2021-08-21 DIAGNOSIS — E55.9 VITAMIN D DEFICIENCY: ICD-10-CM

## 2021-08-21 DIAGNOSIS — Z00.00 ENCOUNTER FOR SUBSEQUENT ANNUAL WELLNESS VISIT (AWV) IN MEDICARE PATIENT: ICD-10-CM

## 2021-08-21 LAB
ALBUMIN SERPL-MCNC: 3.8 G/DL (ref 3.4–5)
ALBUMIN/GLOB SERPL: 1.1 {RATIO} (ref 1–2)
ALP LIVER SERPL-CCNC: 70 U/L
ALT SERPL-CCNC: 26 U/L
ANION GAP SERPL CALC-SCNC: 3 MMOL/L (ref 0–18)
AST SERPL-CCNC: 14 U/L (ref 15–37)
BILIRUB SERPL-MCNC: 0.5 MG/DL (ref 0.1–2)
BUN BLD-MCNC: 12 MG/DL (ref 7–18)
CALCIUM BLD-MCNC: 8.8 MG/DL (ref 8.5–10.1)
CHLORIDE SERPL-SCNC: 109 MMOL/L (ref 98–112)
CHOLEST SMN-MCNC: 158 MG/DL (ref ?–200)
CO2 SERPL-SCNC: 27 MMOL/L (ref 21–32)
CREAT BLD-MCNC: 0.62 MG/DL
EST. AVERAGE GLUCOSE BLD GHB EST-MCNC: 111 MG/DL (ref 68–126)
GLOBULIN PLAS-MCNC: 3.5 G/DL (ref 2.8–4.4)
GLUCOSE BLD-MCNC: 96 MG/DL (ref 70–99)
HBA1C MFR BLD HPLC: 5.5 % (ref ?–5.7)
HDLC SERPL-MCNC: 57 MG/DL (ref 40–59)
LDLC SERPL CALC-MCNC: 88 MG/DL (ref ?–100)
M PROTEIN MFR SERPL ELPH: 7.3 G/DL (ref 6.4–8.2)
NONHDLC SERPL-MCNC: 101 MG/DL (ref ?–130)
OSMOLALITY SERPL CALC.SUM OF ELEC: 288 MOSM/KG (ref 275–295)
PATIENT FASTING Y/N/NP: YES
PATIENT FASTING Y/N/NP: YES
POTASSIUM SERPL-SCNC: 4.2 MMOL/L (ref 3.5–5.1)
SODIUM SERPL-SCNC: 139 MMOL/L (ref 136–145)
TRIGL SERPL-MCNC: 69 MG/DL (ref 30–149)
TSI SER-ACNC: 2.73 MIU/ML (ref 0.36–3.74)
VIT D+METAB SERPL-MCNC: 16.9 NG/ML (ref 30–100)
VLDLC SERPL CALC-MCNC: 11 MG/DL (ref 0–30)

## 2021-08-21 PROCEDURE — 84443 ASSAY THYROID STIM HORMONE: CPT

## 2021-08-21 PROCEDURE — 80061 LIPID PANEL: CPT

## 2021-08-21 PROCEDURE — 80053 COMPREHEN METABOLIC PANEL: CPT

## 2021-08-21 PROCEDURE — 83036 HEMOGLOBIN GLYCOSYLATED A1C: CPT

## 2021-08-21 PROCEDURE — 36415 COLL VENOUS BLD VENIPUNCTURE: CPT

## 2021-08-21 PROCEDURE — 82306 VITAMIN D 25 HYDROXY: CPT

## 2021-08-22 DIAGNOSIS — E55.9 VITAMIN D DEFICIENCY: Primary | ICD-10-CM

## 2021-08-22 RX ORDER — ERGOCALCIFEROL 1.25 MG/1
50000 CAPSULE ORAL WEEKLY
Qty: 12 CAPSULE | Refills: 1 | Status: SHIPPED | OUTPATIENT
Start: 2021-08-22

## 2021-08-24 ENCOUNTER — TELEPHONE (OUTPATIENT)
Dept: INTERNAL MEDICINE CLINIC | Facility: CLINIC | Age: 66
End: 2021-08-24

## 2021-08-24 ENCOUNTER — HOSPITAL ENCOUNTER (OUTPATIENT)
Dept: BONE DENSITY | Age: 66
Discharge: HOME | End: 2021-08-24
Attending: INTERNAL MEDICINE
Payer: MEDICARE

## 2021-08-24 ENCOUNTER — HOSPITAL ENCOUNTER (OUTPATIENT)
Dept: MAMMOGRAPHY | Age: 66
Discharge: HOME | End: 2021-08-24
Attending: INTERNAL MEDICINE
Payer: MEDICARE

## 2021-08-24 DIAGNOSIS — E55.9 VITAMIN D DEFICIENCY: ICD-10-CM

## 2021-08-24 DIAGNOSIS — Z12.31 ENCOUNTER FOR SCREENING MAMMOGRAM FOR MALIGNANT NEOPLASM OF BREAST: ICD-10-CM

## 2021-08-24 DIAGNOSIS — M85.89 OSTEOPENIA OF MULTIPLE SITES: ICD-10-CM

## 2021-08-24 PROCEDURE — 77080 DXA BONE DENSITY AXIAL: CPT | Performed by: INTERNAL MEDICINE

## 2021-08-24 PROCEDURE — 77063 BREAST TOMOSYNTHESIS BI: CPT | Performed by: INTERNAL MEDICINE

## 2021-08-24 PROCEDURE — 77067 SCR MAMMO BI INCL CAD: CPT | Performed by: INTERNAL MEDICINE

## 2021-08-24 NOTE — TELEPHONE ENCOUNTER
Pt stated Vitamin D Rx is not covered by insurance. Pt requesting Good Rx coupon but does not have time to pick on up. Pt instructed to try online and update office.

## 2021-08-30 ENCOUNTER — HOSPITAL ENCOUNTER (OUTPATIENT)
Dept: ULTRASOUND IMAGING | Age: 66
Discharge: HOME | End: 2021-08-30
Attending: INTERNAL MEDICINE
Payer: MEDICARE

## 2021-08-30 ENCOUNTER — HOSPITAL ENCOUNTER (OUTPATIENT)
Dept: MAMMOGRAPHY | Age: 66
Discharge: HOME | End: 2021-08-30
Attending: INTERNAL MEDICINE
Payer: MEDICARE

## 2021-08-30 DIAGNOSIS — R92.2 INCONCLUSIVE MAMMOGRAM: ICD-10-CM

## 2021-08-30 PROCEDURE — 77061 BREAST TOMOSYNTHESIS UNI: CPT | Performed by: INTERNAL MEDICINE

## 2021-08-30 PROCEDURE — 77065 DX MAMMO INCL CAD UNI: CPT | Performed by: INTERNAL MEDICINE

## 2021-08-30 PROCEDURE — 76642 ULTRASOUND BREAST LIMITED: CPT | Performed by: INTERNAL MEDICINE

## 2021-09-08 ENCOUNTER — OFFICE VISIT (OUTPATIENT)
Dept: INTERNAL MEDICINE CLINIC | Facility: CLINIC | Age: 66
End: 2021-09-08
Payer: MEDICARE

## 2021-09-08 VITALS
HEART RATE: 78 BPM | DIASTOLIC BLOOD PRESSURE: 74 MMHG | OXYGEN SATURATION: 98 % | RESPIRATION RATE: 16 BRPM | SYSTOLIC BLOOD PRESSURE: 118 MMHG | WEIGHT: 210 LBS | TEMPERATURE: 98 F | BODY MASS INDEX: 36 KG/M2

## 2021-09-08 DIAGNOSIS — R60.0 BILATERAL LEG EDEMA: Primary | ICD-10-CM

## 2021-09-08 DIAGNOSIS — I10 BENIGN ESSENTIAL HTN: Chronic | ICD-10-CM

## 2021-09-08 DIAGNOSIS — Z86.73 HISTORY OF ARTERIAL ISCHEMIC STROKE: ICD-10-CM

## 2021-09-08 DIAGNOSIS — M79.604 RIGHT LEG PAIN: ICD-10-CM

## 2021-09-08 PROCEDURE — 99214 OFFICE O/P EST MOD 30 MIN: CPT | Performed by: INTERNAL MEDICINE

## 2021-09-08 RX ORDER — CALCITRIOL 0.5 UG/1
0.5 CAPSULE, LIQUID FILLED ORAL DAILY
Qty: 90 CAPSULE | Refills: 1 | Status: SHIPPED | OUTPATIENT
Start: 2021-09-08

## 2021-09-08 NOTE — PROGRESS NOTES
Patient Office Visit    ASSESSMENT AND PLAN:   (R60.0) Bilateral leg edema  (primary encounter diagnosis)  Plan: keep legs elevated as this could be due to venous insufficiency. Recommended compression stockings.  Follow up if new symptoms arise     (Z86.73 ordered in this encounter         [facility], DO  CC:  Patient presents with: Follow - Up        HPI:   Lakeshia Choe is a 77year old female who presents for bilateral leg swelling.  Patient speaks and understands English so no  neede OTHER SURGICAL HISTORY  05/2019    R knee CONNIE   • TOTAL KNEE REPLACEMENT Bilateral        Social History:  Social History    Tobacco Use      Smoking status: Never Smoker      Smokeless tobacco: Never Used    Vaping Use      Vaping Use: Never used    Alcoh normal sinuses and no mouth issues   Eyes: . normal vision no eye pain   Respiratory: normal respirations no cough   Cardiovascular: no CP, or palpitations   Gastrointestinal: normal bowels and no abd pains   Genitourinary:  normal urination no hematuria,

## 2021-09-08 NOTE — PATIENT INSTRUCTIONS
Keep legs elevated  Wear compression stockings  Please schedule your MRI and follow up with neurology    Please let me know if swelling worsens or you have chest pain or shortness of breath

## 2021-09-08 NOTE — TELEPHONE ENCOUNTER
Protocol None showing    Requesting: Calcitrol    LOV: Today  RTC:  None  Filled: 4/8/21 90 days  Recent Labs:  8/21/21    Upcoming OV

## 2021-09-22 ENCOUNTER — HOSPITAL ENCOUNTER (OUTPATIENT)
Dept: MRI IMAGING | Age: 66
Discharge: HOME | End: 2021-09-22
Attending: INTERNAL MEDICINE
Payer: MEDICARE

## 2021-09-22 DIAGNOSIS — Z86.73 HISTORY OF ARTERIAL ISCHEMIC STROKE: ICD-10-CM

## 2021-09-22 PROCEDURE — 70553 MRI BRAIN STEM W/O & W/DYE: CPT | Performed by: INTERNAL MEDICINE

## 2021-09-22 PROCEDURE — A9575 INJ GADOTERATE MEGLUMI 0.1ML: HCPCS | Performed by: INTERNAL MEDICINE

## 2021-11-11 ENCOUNTER — OFFICE VISIT (OUTPATIENT)
Dept: NEUROLOGY | Facility: CLINIC | Age: 66
End: 2021-11-11
Payer: MEDICARE

## 2021-11-11 ENCOUNTER — IMMUNIZATION (OUTPATIENT)
Dept: INTERNAL MEDICINE CLINIC | Facility: CLINIC | Age: 66
End: 2021-11-11
Payer: MEDICARE

## 2021-11-11 VITALS
HEART RATE: 74 BPM | BODY MASS INDEX: 36 KG/M2 | DIASTOLIC BLOOD PRESSURE: 70 MMHG | WEIGHT: 208 LBS | SYSTOLIC BLOOD PRESSURE: 136 MMHG | RESPIRATION RATE: 16 BRPM

## 2021-11-11 DIAGNOSIS — D32.9 MENINGIOMA (HCC): Primary | ICD-10-CM

## 2021-11-11 DIAGNOSIS — Z86.73 HISTORY OF STROKE: ICD-10-CM

## 2021-11-11 DIAGNOSIS — Z23 NEED FOR VACCINATION: Primary | ICD-10-CM

## 2021-11-11 PROCEDURE — 90662 IIV NO PRSV INCREASED AG IM: CPT | Performed by: INTERNAL MEDICINE

## 2021-11-11 PROCEDURE — G0008 ADMIN INFLUENZA VIRUS VAC: HCPCS | Performed by: INTERNAL MEDICINE

## 2021-11-11 PROCEDURE — 99204 OFFICE O/P NEW MOD 45 MIN: CPT | Performed by: OTHER

## 2021-11-11 NOTE — PROGRESS NOTES
HPI:    Patient ID: Luis M Burr is a 77year old female. PCP: Dr Gemma Burgess    HPI  Luis M Burr is a 77year old female who presented for evaluation of small meningioma.   I have seen her before about 5 years ago for a chronic basal ganglia lacunar infa Family History   Problem Relation Age of Onset   • Heart Disease Father    • Heart Disease Mother       Social History    Tobacco Use      Smoking status: Never Smoker      Smokeless tobacco: Never Used    Vaping Use      Vaping Use: Never used    Alcoho Appearance: Well nourished, well developed, no apparent distress. HEENT: Normocephalic and atraumatic. Cardiovascular: Normal rate, regular rhythm and normal heart sounds. Pulmonary/Chest: Effort normal and breath sounds normal.   Abdominal: Soft. of care      Tom Alvarado MD  [facility] This Visit:  Requested Prescriptions      No prescriptions requested or ordered in this encounter       Imaging & Referrals:  None     #[id number]

## 2021-11-11 NOTE — PATIENT INSTRUCTIONS
Take ASPIRIN 81 MG daily    Follow up in 1 year                       After your visit at the [facility] office  today, please direct any follow up questions or medication needs to the staff in our [facility] office so that your concerns may be prom PATIENTS MUST  THEIR OWN NARCOTIC PRESCRIPTIONS. • Written prescriptions must be picked up in office. • Please allow the office 2-3 business days to fill the prescription. • Patient must present photo ID at time of .   PLEASE NOTE: PRESCRI completion. • Fill in and sign any sections that are to be completed by the patient prior to submitting forms to office staff. • Form completion may require an additional fee.    • A signed Release of Information (KALE) must be on file before forms may be

## 2022-01-12 DIAGNOSIS — I10 BENIGN ESSENTIAL HTN: ICD-10-CM

## 2022-01-12 RX ORDER — LOSARTAN POTASSIUM 100 MG/1
TABLET ORAL
Qty: 90 TABLET | Refills: 1 | Status: SHIPPED | OUTPATIENT
Start: 2022-01-12

## 2022-03-09 ENCOUNTER — OFFICE VISIT (OUTPATIENT)
Dept: INTERNAL MEDICINE CLINIC | Facility: CLINIC | Age: 67
End: 2022-03-09
Payer: MEDICARE

## 2022-03-09 VITALS
OXYGEN SATURATION: 99 % | TEMPERATURE: 98 F | DIASTOLIC BLOOD PRESSURE: 76 MMHG | HEART RATE: 84 BPM | BODY MASS INDEX: 33.06 KG/M2 | HEIGHT: 67 IN | WEIGHT: 210.63 LBS | RESPIRATION RATE: 18 BRPM | SYSTOLIC BLOOD PRESSURE: 122 MMHG

## 2022-03-09 DIAGNOSIS — K21.9 GASTROESOPHAGEAL REFLUX DISEASE WITHOUT ESOPHAGITIS: Chronic | ICD-10-CM

## 2022-03-09 DIAGNOSIS — R06.00 DYSPNEA ON EXERTION: ICD-10-CM

## 2022-03-09 DIAGNOSIS — E55.9 VITAMIN D DEFICIENCY: ICD-10-CM

## 2022-03-09 DIAGNOSIS — Z63.6 CAREGIVER STRESS: ICD-10-CM

## 2022-03-09 DIAGNOSIS — I10 BENIGN ESSENTIAL HTN: ICD-10-CM

## 2022-03-09 DIAGNOSIS — M54.50 ACUTE RIGHT-SIDED LOW BACK PAIN WITHOUT SCIATICA: Primary | ICD-10-CM

## 2022-03-09 DIAGNOSIS — F41.1 GAD (GENERALIZED ANXIETY DISORDER): ICD-10-CM

## 2022-03-09 PROCEDURE — 3074F SYST BP LT 130 MM HG: CPT | Performed by: INTERNAL MEDICINE

## 2022-03-09 PROCEDURE — 3008F BODY MASS INDEX DOCD: CPT | Performed by: INTERNAL MEDICINE

## 2022-03-09 PROCEDURE — 99214 OFFICE O/P EST MOD 30 MIN: CPT | Performed by: INTERNAL MEDICINE

## 2022-03-09 PROCEDURE — 3078F DIAST BP <80 MM HG: CPT | Performed by: INTERNAL MEDICINE

## 2022-03-09 RX ORDER — CALCITRIOL 0.5 UG/1
0.5 CAPSULE, LIQUID FILLED ORAL DAILY
Qty: 90 CAPSULE | Refills: 1 | Status: SHIPPED | OUTPATIENT
Start: 2022-03-09

## 2022-03-09 RX ORDER — CYCLOBENZAPRINE HCL 10 MG
10 TABLET ORAL 3 TIMES DAILY PRN
Qty: 30 TABLET | Refills: 2 | Status: SHIPPED | OUTPATIENT
Start: 2022-03-09

## 2022-03-09 RX ORDER — PANTOPRAZOLE SODIUM 20 MG/1
20 TABLET, DELAYED RELEASE ORAL
Qty: 90 TABLET | Refills: 0 | Status: SHIPPED | OUTPATIENT
Start: 2022-03-09 | End: 2022-03-10

## 2022-03-09 RX ORDER — MELOXICAM 15 MG/1
15 TABLET ORAL DAILY PRN
Qty: 90 TABLET | Refills: 0 | Status: SHIPPED | OUTPATIENT
Start: 2022-03-09

## 2022-03-09 RX ORDER — ALPRAZOLAM 0.25 MG/1
0.25 TABLET ORAL 2 TIMES DAILY PRN
Qty: 60 TABLET | Refills: 0 | Status: SHIPPED | OUTPATIENT
Start: 2022-03-09

## 2022-03-09 SDOH — SOCIAL STABILITY - SOCIAL INSECURITY: DEPENDENT RELATIVE NEEDING CARE AT HOME: Z63.6

## 2022-03-09 NOTE — PATIENT INSTRUCTIONS
- Get blood tests done to look at kidneys and liver. You do not have to fast.  - Schedule x-ray of back to look into back pain  - Schedule heart echocardiogram to look into shortness of breath. Can call [phone] to schedule both of these tests. - See handout on home physical therapy exercises for the back  - If echocardiogram is normal we may consider a pulmonary function test (lung function test) to look into the shortness of breath. - Follow up in 6 months for chronic issues/Medicare Supervisit; follow up earlier as needed. It was a pleasure seeing you in the clinic today. Thank you for choosing the [facility] office for your healthcare needs. Please call at [phone] with any questions or concerns.     Naomi Olmos MD

## 2022-03-10 RX ORDER — PANTOPRAZOLE SODIUM 20 MG/1
TABLET, DELAYED RELEASE ORAL
Qty: 90 TABLET | Refills: 0 | Status: SHIPPED | OUTPATIENT
Start: 2022-03-10

## 2022-03-10 NOTE — TELEPHONE ENCOUNTER
LOV: 3/9/2022 with Dr. Gemma Fitzpatrick  RTC: 6 months  Last Relevant Labs: 8/21/2021   Filled: 3/9/2022    #90 with 0 refills    Future Appointments   Date Time Provider Oc Beth   3/22/2022 12:45 PM [facility] CARD STRESS ECHO RM1 [facility] CARD [place]   3/22/2022  2:30 PM [facility] XR RM1 [facility] XRAY [facility]   3/22/2022  3:00 PM [facility] LABTECHS [facility] LAB [facility]   7/25/2022  1:30 PM MD Dewayne Soriano [address]08 RCK Patient notified to come in after 8/22 and ok (per clinic supervisor) to have twinrix at [facility].

## 2022-03-22 ENCOUNTER — HOSPITAL ENCOUNTER (OUTPATIENT)
Dept: CV DIAGNOSTICS | Age: 67
Discharge: HOME | End: 2022-03-22
Attending: INTERNAL MEDICINE
Payer: MEDICARE

## 2022-03-22 ENCOUNTER — LAB ENCOUNTER (OUTPATIENT)
Dept: LAB | Age: 67
End: 2022-03-22
Attending: INTERNAL MEDICINE
Payer: MEDICARE

## 2022-03-22 ENCOUNTER — HOSPITAL ENCOUNTER (OUTPATIENT)
Dept: GENERAL RADIOLOGY | Age: 67
Discharge: HOME | End: 2022-03-22
Attending: INTERNAL MEDICINE
Payer: MEDICARE

## 2022-03-22 DIAGNOSIS — R06.00 DYSPNEA ON EXERTION: ICD-10-CM

## 2022-03-22 DIAGNOSIS — M54.50 ACUTE RIGHT-SIDED LOW BACK PAIN WITHOUT SCIATICA: ICD-10-CM

## 2022-03-22 DIAGNOSIS — E55.9 VITAMIN D DEFICIENCY: ICD-10-CM

## 2022-03-22 LAB
ALBUMIN SERPL-MCNC: 3.9 G/DL (ref 3.4–5)
ALBUMIN/GLOB SERPL: 1.1 {RATIO} (ref 1–2)
ALP LIVER SERPL-CCNC: 74 U/L
ALT SERPL-CCNC: 31 U/L
ANION GAP SERPL CALC-SCNC: 6 MMOL/L (ref 0–18)
AST SERPL-CCNC: 20 U/L (ref 15–37)
BUN BLD-MCNC: 11 MG/DL (ref 7–18)
CALCIUM BLD-MCNC: 8.8 MG/DL (ref 8.5–10.1)
CHLORIDE SERPL-SCNC: 106 MMOL/L (ref 98–112)
CO2 SERPL-SCNC: 28 MMOL/L (ref 21–32)
FASTING STATUS PATIENT QL REPORTED: NO
GLOBULIN PLAS-MCNC: 3.4 G/DL (ref 2.8–4.4)
GLUCOSE BLD-MCNC: 90 MG/DL (ref 70–99)
OSMOLALITY SERPL CALC.SUM OF ELEC: 289 MOSM/KG (ref 275–295)
POTASSIUM SERPL-SCNC: 4 MMOL/L (ref 3.5–5.1)
PROT SERPL-MCNC: 7.3 G/DL (ref 6.4–8.2)
SODIUM SERPL-SCNC: 140 MMOL/L (ref 136–145)
VIT D+METAB SERPL-MCNC: 27.8 NG/ML (ref 30–100)

## 2022-03-22 PROCEDURE — 36415 COLL VENOUS BLD VENIPUNCTURE: CPT

## 2022-03-22 PROCEDURE — 93306 TTE W/DOPPLER COMPLETE: CPT | Performed by: INTERNAL MEDICINE

## 2022-03-22 PROCEDURE — 80053 COMPREHEN METABOLIC PANEL: CPT

## 2022-03-22 PROCEDURE — 82306 VITAMIN D 25 HYDROXY: CPT

## 2022-04-22 ENCOUNTER — HOSPITAL ENCOUNTER (OUTPATIENT)
Dept: GENERAL RADIOLOGY | Age: 67
Discharge: HOME | End: 2022-04-22
Attending: INTERNAL MEDICINE
Payer: MEDICARE

## 2022-04-22 PROCEDURE — 72110 X-RAY EXAM L-2 SPINE 4/>VWS: CPT | Performed by: INTERNAL MEDICINE

## 2022-05-13 ENCOUNTER — OFFICE VISIT (OUTPATIENT)
Dept: INTERNAL MEDICINE CLINIC | Facility: CLINIC | Age: 67
End: 2022-05-13
Payer: MEDICARE

## 2022-05-13 ENCOUNTER — TELEPHONE (OUTPATIENT)
Dept: INTERNAL MEDICINE CLINIC | Facility: CLINIC | Age: 67
End: 2022-05-13

## 2022-05-13 VITALS
DIASTOLIC BLOOD PRESSURE: 70 MMHG | WEIGHT: 211.38 LBS | SYSTOLIC BLOOD PRESSURE: 120 MMHG | TEMPERATURE: 98 F | HEIGHT: 67 IN | OXYGEN SATURATION: 97 % | HEART RATE: 93 BPM | BODY MASS INDEX: 33.18 KG/M2 | RESPIRATION RATE: 16 BRPM

## 2022-05-13 DIAGNOSIS — M54.2 NECK PAIN: Primary | ICD-10-CM

## 2022-05-13 DIAGNOSIS — Z96.651 HISTORY OF REVISION OF TOTAL REPLACEMENT OF RIGHT KNEE JOINT: ICD-10-CM

## 2022-05-13 DIAGNOSIS — H40.003 GLAUCOMA SUSPECT OF BOTH EYES: ICD-10-CM

## 2022-05-13 DIAGNOSIS — E89.0 POSTOPERATIVE HYPOTHYROIDISM: ICD-10-CM

## 2022-05-13 DIAGNOSIS — M54.50 ACUTE RIGHT-SIDED LOW BACK PAIN WITHOUT SCIATICA: ICD-10-CM

## 2022-05-13 DIAGNOSIS — F41.1 GAD (GENERALIZED ANXIETY DISORDER): Chronic | ICD-10-CM

## 2022-05-13 DIAGNOSIS — D32.9 MENINGIOMA (HCC): ICD-10-CM

## 2022-05-13 DIAGNOSIS — I10 BENIGN ESSENTIAL HTN: ICD-10-CM

## 2022-05-13 DIAGNOSIS — F33.0 MILD EPISODE OF RECURRENT MAJOR DEPRESSIVE DISORDER (HCC): ICD-10-CM

## 2022-05-13 DIAGNOSIS — R06.02 SHORTNESS OF BREATH: ICD-10-CM

## 2022-05-13 PROCEDURE — 3008F BODY MASS INDEX DOCD: CPT | Performed by: INTERNAL MEDICINE

## 2022-05-13 PROCEDURE — 3078F DIAST BP <80 MM HG: CPT | Performed by: INTERNAL MEDICINE

## 2022-05-13 PROCEDURE — 99214 OFFICE O/P EST MOD 30 MIN: CPT | Performed by: INTERNAL MEDICINE

## 2022-05-13 PROCEDURE — 3074F SYST BP LT 130 MM HG: CPT | Performed by: INTERNAL MEDICINE

## 2022-05-13 NOTE — PATIENT INSTRUCTIONS
- We will try and get a referral approved for chiropractor for neck and back pain  - Referrals entered for Sonia Quintero. You can call our office in a week to follow up on the referrals. [phone]. - Schedule Pulmonary function test to look into shortness of breath. Call [phone] to schedule  - Disability form filled out  - You can schedule your  for an appointment with me next week. - Follow up with me in 3 months for chronic issues/Medicare Wellness visit; follow up earlier as needed. It was a pleasure seeing you in the clinic today. Thank you for choosing the [facility] office for your healthcare needs. Please call at [phone] with any questions or concerns.     Maddy [place] MD

## 2022-05-13 NOTE — TELEPHONE ENCOUNTER
Received Disabilities form from patient made copy and original was returned to patient. [place] was placed in Dr Gregoria Jo.

## 2022-05-14 PROBLEM — D32.9 MENINGIOMA (HCC): Status: ACTIVE | Noted: 2022-05-14

## 2022-05-26 ENCOUNTER — OFFICE VISIT (OUTPATIENT)
Dept: INTERNAL MEDICINE CLINIC | Facility: CLINIC | Age: 67
End: 2022-05-26
Payer: MEDICARE

## 2022-05-26 VITALS
OXYGEN SATURATION: 98 % | WEIGHT: 210.81 LBS | BODY MASS INDEX: 33 KG/M2 | DIASTOLIC BLOOD PRESSURE: 80 MMHG | TEMPERATURE: 98 F | SYSTOLIC BLOOD PRESSURE: 130 MMHG | HEART RATE: 93 BPM

## 2022-05-26 DIAGNOSIS — M54.50 ACUTE BILATERAL LOW BACK PAIN WITHOUT SCIATICA: Primary | ICD-10-CM

## 2022-05-26 PROCEDURE — 3079F DIAST BP 80-89 MM HG: CPT | Performed by: INTERNAL MEDICINE

## 2022-05-26 PROCEDURE — 99214 OFFICE O/P EST MOD 30 MIN: CPT | Performed by: INTERNAL MEDICINE

## 2022-05-26 PROCEDURE — 3075F SYST BP GE 130 - 139MM HG: CPT | Performed by: INTERNAL MEDICINE

## 2022-05-26 RX ORDER — TRAMADOL HYDROCHLORIDE 50 MG/1
50 TABLET ORAL EVERY 8 HOURS PRN
Qty: 30 TABLET | Refills: 0 | Status: SHIPPED | OUTPATIENT
Start: 2022-05-26

## 2022-06-02 ENCOUNTER — TELEPHONE (OUTPATIENT)
Dept: INTERNAL MEDICINE CLINIC | Facility: CLINIC | Age: 67
End: 2022-06-02

## 2022-06-02 DIAGNOSIS — H40.003 GLAUCOMA SUSPECT OF BOTH EYES: Primary | ICD-10-CM

## 2022-06-02 NOTE — TELEPHONE ENCOUNTER
Pt requesting referral for opthamology Glaucoma  Pt stated that she had already spoke with medicare and that Dr Gladys Ryan in [facility] was in her network    Dr Gladys Ryan   phone - [phone]  Glaucoma    Best contact for pt [phone]

## 2022-06-07 DIAGNOSIS — M54.50 ACUTE RIGHT-SIDED LOW BACK PAIN WITHOUT SCIATICA: ICD-10-CM

## 2022-06-07 RX ORDER — MELOXICAM 15 MG/1
15 TABLET ORAL DAILY PRN
Qty: 90 TABLET | Refills: 0 | Status: SHIPPED | OUTPATIENT
Start: 2022-06-07

## 2022-06-08 ENCOUNTER — PATIENT MESSAGE (OUTPATIENT)
Dept: INTERNAL MEDICINE CLINIC | Facility: CLINIC | Age: 67
End: 2022-06-08

## 2022-06-09 ENCOUNTER — OFFICE VISIT (OUTPATIENT)
Dept: INTERNAL MEDICINE CLINIC | Facility: CLINIC | Age: 67
End: 2022-06-09
Payer: MEDICARE

## 2022-06-09 VITALS
WEIGHT: 209 LBS | TEMPERATURE: 98 F | DIASTOLIC BLOOD PRESSURE: 64 MMHG | OXYGEN SATURATION: 98 % | HEIGHT: 64.5 IN | SYSTOLIC BLOOD PRESSURE: 120 MMHG | HEART RATE: 90 BPM | RESPIRATION RATE: 16 BRPM | BODY MASS INDEX: 35.25 KG/M2

## 2022-06-09 DIAGNOSIS — M54.50 ACUTE RIGHT-SIDED LOW BACK PAIN WITHOUT SCIATICA: ICD-10-CM

## 2022-06-09 DIAGNOSIS — R42 DIZZINESS: ICD-10-CM

## 2022-06-09 DIAGNOSIS — R11.0 NAUSEA: Primary | ICD-10-CM

## 2022-06-09 DIAGNOSIS — H40.003 GLAUCOMA SUSPECT OF BOTH EYES: ICD-10-CM

## 2022-06-09 DIAGNOSIS — K21.9 GASTROESOPHAGEAL REFLUX DISEASE WITHOUT ESOPHAGITIS: ICD-10-CM

## 2022-06-09 PROCEDURE — 99214 OFFICE O/P EST MOD 30 MIN: CPT | Performed by: INTERNAL MEDICINE

## 2022-06-09 PROCEDURE — 3078F DIAST BP <80 MM HG: CPT | Performed by: INTERNAL MEDICINE

## 2022-06-09 PROCEDURE — 3008F BODY MASS INDEX DOCD: CPT | Performed by: INTERNAL MEDICINE

## 2022-06-09 PROCEDURE — 3074F SYST BP LT 130 MM HG: CPT | Performed by: INTERNAL MEDICINE

## 2022-06-09 RX ORDER — ONDANSETRON 4 MG/1
4 TABLET, ORALLY DISINTEGRATING ORAL EVERY 8 HOURS PRN
Qty: 30 TABLET | Refills: 2 | Status: SHIPPED | OUTPATIENT
Start: 2022-06-09

## 2022-06-09 NOTE — PATIENT INSTRUCTIONS
- Start nausea medication, ondansetron. Take 1 tablet 3 times daily as needed. - Follow up with Dr. Blair Cardona. They should call you to schedule an appointment. Here is his address:  [address] [place] [address]  [phone]    - Follow up with Dr. Corean Brunner (chiropractor)  [address] [place] [address] [address]  [place], [facility]  [phone]    It was a pleasure seeing you in the clinic today. Thank you for choosing the [facility] office for your healthcare needs. Please call at [phone] with any questions or concerns.     Fahad Franco MD

## 2022-06-28 RX ORDER — FUROSEMIDE 20 MG/1
TABLET ORAL
Qty: 90 TABLET | Refills: 1 | Status: SHIPPED | OUTPATIENT
Start: 2022-06-28

## 2022-07-26 DIAGNOSIS — I10 BENIGN ESSENTIAL HTN: ICD-10-CM

## 2022-07-27 RX ORDER — LOSARTAN POTASSIUM 100 MG/1
100 TABLET ORAL DAILY
Qty: 90 TABLET | Refills: 1 | Status: SHIPPED | OUTPATIENT
Start: 2022-07-27

## 2022-07-27 NOTE — TELEPHONE ENCOUNTER
Protocol Passed     Medication Requested:    LOSARTAN 100 MG Oral Tab  Filled: 01/12/22 #90 w/ 1 refill   LOV: 06/09/22 w/ Dr. Jaylen Arce  RTC: Dock Maisha: not on file   Recent Labs: 08/21/21

## 2022-08-16 DIAGNOSIS — K21.9 GASTROESOPHAGEAL REFLUX DISEASE WITHOUT ESOPHAGITIS: Chronic | ICD-10-CM

## 2022-08-16 DIAGNOSIS — I10 BENIGN ESSENTIAL HTN: ICD-10-CM

## 2022-08-16 DIAGNOSIS — M54.50 ACUTE RIGHT-SIDED LOW BACK PAIN WITHOUT SCIATICA: ICD-10-CM

## 2022-08-16 RX ORDER — PANTOPRAZOLE SODIUM 20 MG/1
20 TABLET, DELAYED RELEASE ORAL
Qty: 90 TABLET | Refills: 1 | Status: SHIPPED | OUTPATIENT
Start: 2022-08-16

## 2022-08-16 RX ORDER — LOSARTAN POTASSIUM 100 MG/1
100 TABLET ORAL DAILY
Qty: 90 TABLET | Refills: 1 | Status: SHIPPED | OUTPATIENT
Start: 2022-08-16

## 2022-08-16 RX ORDER — CALCITRIOL 0.5 UG/1
0.5 CAPSULE, LIQUID FILLED ORAL DAILY
Qty: 90 CAPSULE | Refills: 1 | Status: SHIPPED | OUTPATIENT
Start: 2022-08-16

## 2022-08-16 RX ORDER — CYCLOBENZAPRINE HCL 10 MG
10 TABLET ORAL 3 TIMES DAILY PRN
Qty: 30 TABLET | Refills: 2 | Status: SHIPPED | OUTPATIENT
Start: 2022-08-16

## 2022-08-16 NOTE — TELEPHONE ENCOUNTER
LOV: 6/9/2022 with Dr. Tabitha Albert  RTC: 2-3 months  Last Relevant Labs: 3/22/2022  Filled: 3/9/2022     #30 with 2 refills (Cyclobenzaprine 10 mg) and #90 with 1 refill (Calcitriol 0.5 mcg)   3/10/2022 (Pantoprazole 40 mg)   #90 with 0 refills  7/27/2022 (Losartan 100 mg)    #90 with 1 refill to [address] [place], [place]      Future Appointments   Date Time Provider Oc Presleyi   9/14/2022 10:40 AM Thomas Busch MD [facility] ORTHO 75 [facility]   10/28/2022 10:20 AM Tori Maki MD [facility] SUB GI

## 2022-08-17 DIAGNOSIS — Z12.31 ENCOUNTER FOR SCREENING MAMMOGRAM FOR MALIGNANT NEOPLASM OF BREAST: Primary | ICD-10-CM

## 2022-08-24 NOTE — TELEPHONE ENCOUNTER
Please advise,      I do not see any recent imaging/labs Consent: Prior to the procedure, written consent was obtained and risks were reviewed, including but not limited to: redness, peeling, blistering, pigmentary change, scarring, infection, and pain. Patient is aware multiple treatments may be necessary to achieve the desired outcome.

## 2022-08-26 ENCOUNTER — TELEPHONE (OUTPATIENT)
Dept: INTERNAL MEDICINE CLINIC | Facility: CLINIC | Age: 67
End: 2022-08-26

## 2022-09-06 ENCOUNTER — HOSPITAL ENCOUNTER (OUTPATIENT)
Dept: MAMMOGRAPHY | Age: 67
Discharge: HOME | End: 2022-09-06
Attending: INTERNAL MEDICINE
Payer: MEDICARE

## 2022-09-06 DIAGNOSIS — Z12.31 ENCOUNTER FOR SCREENING MAMMOGRAM FOR MALIGNANT NEOPLASM OF BREAST: ICD-10-CM

## 2022-09-06 PROCEDURE — 77067 SCR MAMMO BI INCL CAD: CPT | Performed by: INTERNAL MEDICINE

## 2022-09-06 PROCEDURE — 77063 BREAST TOMOSYNTHESIS BI: CPT | Performed by: INTERNAL MEDICINE

## 2022-09-21 ENCOUNTER — LAB ENCOUNTER (OUTPATIENT)
Dept: LAB | Age: 67
End: 2022-09-21
Attending: INTERNAL MEDICINE

## 2022-09-21 ENCOUNTER — HOSPITAL ENCOUNTER (OUTPATIENT)
Dept: GENERAL RADIOLOGY | Age: 67
Discharge: HOME | End: 2022-09-21
Attending: ORTHOPAEDIC SURGERY

## 2022-09-21 ENCOUNTER — TELEPHONE (OUTPATIENT)
Dept: ORTHOPEDICS CLINIC | Facility: CLINIC | Age: 67
End: 2022-09-21

## 2022-09-21 ENCOUNTER — OFFICE VISIT (OUTPATIENT)
Dept: ORTHOPEDICS CLINIC | Facility: CLINIC | Age: 67
End: 2022-09-21

## 2022-09-21 ENCOUNTER — OFFICE VISIT (OUTPATIENT)
Dept: INTERNAL MEDICINE CLINIC | Facility: CLINIC | Age: 67
End: 2022-09-21

## 2022-09-21 VITALS — HEIGHT: 66 IN | WEIGHT: 202 LBS | BODY MASS INDEX: 32.47 KG/M2

## 2022-09-21 VITALS
RESPIRATION RATE: 16 BRPM | TEMPERATURE: 98 F | DIASTOLIC BLOOD PRESSURE: 64 MMHG | HEART RATE: 82 BPM | WEIGHT: 202.81 LBS | BODY MASS INDEX: 34 KG/M2 | SYSTOLIC BLOOD PRESSURE: 130 MMHG | OXYGEN SATURATION: 98 %

## 2022-09-21 DIAGNOSIS — E89.0 POSTOPERATIVE HYPOTHYROIDISM: ICD-10-CM

## 2022-09-21 DIAGNOSIS — I10 PRIMARY HYPERTENSION: ICD-10-CM

## 2022-09-21 DIAGNOSIS — R06.09 DYSPNEA ON EXERTION: ICD-10-CM

## 2022-09-21 DIAGNOSIS — R53.83 FATIGUE, UNSPECIFIED TYPE: Primary | ICD-10-CM

## 2022-09-21 DIAGNOSIS — Z96.651 HISTORY OF REVISION OF TOTAL REPLACEMENT OF RIGHT KNEE JOINT: Primary | ICD-10-CM

## 2022-09-21 DIAGNOSIS — R53.83 FATIGUE, UNSPECIFIED TYPE: ICD-10-CM

## 2022-09-21 DIAGNOSIS — M79.645 PAIN OF LEFT THUMB: ICD-10-CM

## 2022-09-21 DIAGNOSIS — Z96.651 HISTORY OF REVISION OF TOTAL REPLACEMENT OF RIGHT KNEE JOINT: ICD-10-CM

## 2022-09-21 DIAGNOSIS — E55.9 VITAMIN D DEFICIENCY: ICD-10-CM

## 2022-09-21 DIAGNOSIS — Z00.00 PREVENTATIVE HEALTH CARE: ICD-10-CM

## 2022-09-21 LAB
ALBUMIN SERPL-MCNC: 3.5 G/DL (ref 3.4–5)
ALBUMIN/GLOB SERPL: 1 {RATIO} (ref 1–2)
ALP LIVER SERPL-CCNC: 70 U/L
ALT SERPL-CCNC: 26 U/L
ANION GAP SERPL CALC-SCNC: 3 MMOL/L (ref 0–18)
AST SERPL-CCNC: 15 U/L (ref 15–37)
BASOPHILS # BLD AUTO: 0.01 X10(3) UL (ref 0–0.2)
BASOPHILS NFR BLD AUTO: 0.1 %
BILIRUB SERPL-MCNC: 0.4 MG/DL (ref 0.1–2)
BUN BLD-MCNC: 11 MG/DL (ref 7–18)
CALCIUM BLD-MCNC: 8.7 MG/DL (ref 8.5–10.1)
CHLORIDE SERPL-SCNC: 110 MMOL/L (ref 98–112)
CHOLEST SERPL-MCNC: 162 MG/DL (ref ?–200)
CK SERPL-CCNC: 77 U/L
CO2 SERPL-SCNC: 25 MMOL/L (ref 21–32)
CREAT BLD-MCNC: 0.62 MG/DL
EOSINOPHIL # BLD AUTO: 0.33 X10(3) UL (ref 0–0.7)
EOSINOPHIL NFR BLD AUTO: 4.5 %
ERYTHROCYTE [DISTWIDTH] IN BLOOD BY AUTOMATED COUNT: 12.9 %
FASTING PATIENT LIPID ANSWER: NO
FASTING STATUS PATIENT QL REPORTED: NO
FOLATE SERPL-MCNC: 11.7 NG/ML (ref 8.7–?)
GFR SERPLBLD BASED ON 1.73 SQ M-ARVRAT: 98 ML/MIN/1.73M2 (ref 60–?)
GLOBULIN PLAS-MCNC: 3.5 G/DL (ref 2.8–4.4)
GLUCOSE BLD-MCNC: 85 MG/DL (ref 70–99)
HCT VFR BLD AUTO: 38.7 %
HDLC SERPL-MCNC: 59 MG/DL (ref 40–59)
HGB BLD-MCNC: 12.6 G/DL
IMM GRANULOCYTES # BLD AUTO: 0.03 X10(3) UL (ref 0–1)
IMM GRANULOCYTES NFR BLD: 0.4 %
LDLC SERPL CALC-MCNC: 88 MG/DL (ref ?–100)
LYMPHOCYTES # BLD AUTO: 2.75 X10(3) UL (ref 1–4)
LYMPHOCYTES NFR BLD AUTO: 37.1 %
MCH RBC QN AUTO: 28.6 PG (ref 26–34)
MCHC RBC AUTO-ENTMCNC: 32.6 G/DL (ref 31–37)
MCV RBC AUTO: 88 FL
MONOCYTES # BLD AUTO: 0.63 X10(3) UL (ref 0.1–1)
MONOCYTES NFR BLD AUTO: 8.5 %
NEUTROPHILS # BLD AUTO: 3.66 X10 (3) UL (ref 1.5–7.7)
NEUTROPHILS # BLD AUTO: 3.66 X10(3) UL (ref 1.5–7.7)
NEUTROPHILS NFR BLD AUTO: 49.4 %
NONHDLC SERPL-MCNC: 103 MG/DL (ref ?–130)
OSMOLALITY SERPL CALC.SUM OF ELEC: 285 MOSM/KG (ref 275–295)
PLATELET # BLD AUTO: 337 10(3)UL (ref 150–450)
POTASSIUM SERPL-SCNC: 3.9 MMOL/L (ref 3.5–5.1)
PROT SERPL-MCNC: 7 G/DL (ref 6.4–8.2)
RBC # BLD AUTO: 4.4 X10(6)UL
SODIUM SERPL-SCNC: 138 MMOL/L (ref 136–145)
T3FREE SERPL-MCNC: 2.61 PG/ML (ref 2.4–4.2)
T4 FREE SERPL-MCNC: 1.2 NG/DL (ref 0.8–1.7)
TRIGL SERPL-MCNC: 81 MG/DL (ref 30–149)
TSI SER-ACNC: 0.24 MIU/ML (ref 0.36–3.74)
VIT B12 SERPL-MCNC: 331 PG/ML (ref 193–986)
VIT D+METAB SERPL-MCNC: 16.7 NG/ML (ref 30–100)
VLDLC SERPL CALC-MCNC: 13 MG/DL (ref 0–30)
WBC # BLD AUTO: 7.4 X10(3) UL (ref 4–11)

## 2022-09-21 PROCEDURE — 3008F BODY MASS INDEX DOCD: CPT | Performed by: ORTHOPAEDIC SURGERY

## 2022-09-21 PROCEDURE — 85025 COMPLETE CBC W/AUTO DIFF WBC: CPT

## 2022-09-21 PROCEDURE — 80061 LIPID PANEL: CPT

## 2022-09-21 PROCEDURE — 36415 COLL VENOUS BLD VENIPUNCTURE: CPT

## 2022-09-21 PROCEDURE — 84443 ASSAY THYROID STIM HORMONE: CPT

## 2022-09-21 PROCEDURE — 82746 ASSAY OF FOLIC ACID SERUM: CPT

## 2022-09-21 PROCEDURE — 3075F SYST BP GE 130 - 139MM HG: CPT | Performed by: INTERNAL MEDICINE

## 2022-09-21 PROCEDURE — 84481 FREE ASSAY (FT-3): CPT

## 2022-09-21 PROCEDURE — 82306 VITAMIN D 25 HYDROXY: CPT

## 2022-09-21 PROCEDURE — 73562 X-RAY EXAM OF KNEE 3: CPT | Performed by: ORTHOPAEDIC SURGERY

## 2022-09-21 PROCEDURE — 86038 ANTINUCLEAR ANTIBODIES: CPT

## 2022-09-21 PROCEDURE — 84439 ASSAY OF FREE THYROXINE: CPT

## 2022-09-21 PROCEDURE — 99213 OFFICE O/P EST LOW 20 MIN: CPT | Performed by: ORTHOPAEDIC SURGERY

## 2022-09-21 PROCEDURE — 82607 VITAMIN B-12: CPT

## 2022-09-21 PROCEDURE — 3078F DIAST BP <80 MM HG: CPT | Performed by: INTERNAL MEDICINE

## 2022-09-21 PROCEDURE — 82550 ASSAY OF CK (CPK): CPT

## 2022-09-21 PROCEDURE — 99214 OFFICE O/P EST MOD 30 MIN: CPT | Performed by: INTERNAL MEDICINE

## 2022-09-21 PROCEDURE — 1125F AMNT PAIN NOTED PAIN PRSNT: CPT | Performed by: ORTHOPAEDIC SURGERY

## 2022-09-21 PROCEDURE — 80053 COMPREHEN METABOLIC PANEL: CPT

## 2022-09-21 NOTE — PATIENT INSTRUCTIONS
- Get blood tests done today  - If blood tests are ok we may do further test to look into your tiredness (chest x-ray, breathing test, et Rania Candsimon)  - See handout for exercises for hand/thumb pain. If that does not help we may refer you to Dr. Hannah Postal partner, Dr. Rufina Disla have an appointment with Dr. Phoebe Hunter on October 28th at [facility] 119 can follow up with our ENT specialists for his neck bump:  Dr. Libby Pope, and [address] [place] [address]  [phone]    [address] [place] [address]  [phone]  - Follow up in 6 months for chronic issues/Medicare Wellness visit; follow up earlier as needed. It was a pleasure seeing you in the clinic today. Thank you for choosing the [facility] office for your healthcare needs. Please call at [phone] with any questions or concerns.     Halley Blanchard MD

## 2022-09-21 NOTE — PROGRESS NOTES
[facility] Progress Note    Subjective: Patient returns for 2-year postop appointment on revision right knee replacement. She states she was doing about the same, but over the past 3 weeks she has noticed increased pain. She does note that she has been more active with Sarah Callahanman doing it 3 times per week. She reports the pain around the knee and outside of the knee that goes up the lateral thigh up towards the hip. She also has some discomfort going down the leg and pain towards the ankle and foot. She is not taking medications for pain. She has stopped Abdifatah and is started resting. Objective: Patient is awake alert no distress. Right knee incision well-healed. She demonstrates full extension, flexion just past 90 to around 100. Knee remained stable throughout range of motion. She does have tenderness to palpation over the greater trochanter and down the IT band, reproduces pain complaint. External rotation of the hip causes stretching pain in the right proximal thigh. Internal rotation is painless      Imaging: X-rays of the right knee personally viewed, independently interpreted and radiology report read. Demonstrates semiconstrained right total knee arthroplasty unchanged from films 1 year ago. Assessment/Plan: 70-year-old female 2 years postop revision right total knee replacement. From the knee standpoint patient appears to be doing well. Her symptoms appear more consistent with iliotibial band tendinitis and abductor tendinitis/bursitis. I discussed treatment options for this including physical therapy is the mainstay, possible steroid injection. Patient states she does not want any sort of injection, and she would like to continue with exercises on her own. She is going to taper back on Abdifatah and will work more on low impact exercises. From the knee standpoint advised follow-up every 5 years at this point with repeat x-rays, or contact us sooner if needed.     Chanelle Desouza MD, [address] [place] [address] Orthopedic Surgery  Phone [phone]  Fax [phone]

## 2022-09-22 DIAGNOSIS — E55.9 VITAMIN D DEFICIENCY: ICD-10-CM

## 2022-09-22 RX ORDER — ERGOCALCIFEROL 1.25 MG/1
50000 CAPSULE ORAL WEEKLY
Qty: 12 CAPSULE | Refills: 1 | Status: SHIPPED | OUTPATIENT
Start: 2022-09-22

## 2022-10-17 NOTE — TELEPHONE ENCOUNTER
Refill Request for True Metrix , [employer] True Metrix test Strips and TruePlus 33g Lancets       Pending Medication supplies routed to [facility] 8 routed to Medical Assistant basket.

## 2022-10-24 ENCOUNTER — OFFICE VISIT (OUTPATIENT)
Dept: INTERNAL MEDICINE CLINIC | Facility: CLINIC | Age: 67
End: 2022-10-24
Payer: MEDICARE

## 2022-10-24 VITALS
BODY MASS INDEX: 33 KG/M2 | SYSTOLIC BLOOD PRESSURE: 120 MMHG | OXYGEN SATURATION: 98 % | HEART RATE: 86 BPM | WEIGHT: 206.38 LBS | RESPIRATION RATE: 16 BRPM | TEMPERATURE: 98 F | DIASTOLIC BLOOD PRESSURE: 62 MMHG

## 2022-10-24 DIAGNOSIS — M54.9 UPPER BACK PAIN: ICD-10-CM

## 2022-10-24 DIAGNOSIS — R05.3 PERSISTENT COUGH: Primary | ICD-10-CM

## 2022-10-24 DIAGNOSIS — F41.1 GAD (GENERALIZED ANXIETY DISORDER): ICD-10-CM

## 2022-10-24 DIAGNOSIS — F33.0 MILD EPISODE OF RECURRENT MAJOR DEPRESSIVE DISORDER (HCC): ICD-10-CM

## 2022-10-24 DIAGNOSIS — I10 PRIMARY HYPERTENSION: ICD-10-CM

## 2022-10-24 PROCEDURE — 3078F DIAST BP <80 MM HG: CPT | Performed by: INTERNAL MEDICINE

## 2022-10-24 PROCEDURE — 99214 OFFICE O/P EST MOD 30 MIN: CPT | Performed by: INTERNAL MEDICINE

## 2022-10-24 PROCEDURE — 3074F SYST BP LT 130 MM HG: CPT | Performed by: INTERNAL MEDICINE

## 2022-10-24 RX ORDER — CALCIUM CITRATE/VITAMIN D3 200MG-6.25
TABLET ORAL
Qty: 100 STRIP | Refills: 3 | Status: CANCELLED | OUTPATIENT
Start: 2022-10-24

## 2022-10-24 RX ORDER — GLUCOSAM/CHON-MSM1/C/MANG/BOSW 500-416.6
1 TABLET ORAL
Refills: 0 | Status: CANCELLED | OUTPATIENT
Start: 2022-10-24 | End: 2023-10-24

## 2022-10-24 RX ORDER — BENZONATATE 100 MG/1
100 CAPSULE ORAL 3 TIMES DAILY PRN
Qty: 30 CAPSULE | Refills: 0 | Status: SHIPPED | OUTPATIENT
Start: 2022-10-24

## 2022-10-24 RX ORDER — CODEINE PHOSPHATE AND GUAIFENESIN 10; 100 MG/5ML; MG/5ML
5 SOLUTION ORAL NIGHTLY PRN
Qty: 118 ML | Refills: 0 | Status: SHIPPED | OUTPATIENT
Start: 2022-10-24

## 2022-10-24 RX ORDER — BLOOD-GLUCOSE METER
1 EACH MISCELLANEOUS 2 TIMES DAILY
Refills: 0 | Status: CANCELLED | COMMUNITY
Start: 2022-10-24 | End: 2023-10-24

## 2022-10-24 RX ORDER — BLOOD-GLUCOSE CONTROL, LOW
EACH MISCELLANEOUS
Refills: 0 | Status: CANCELLED | OUTPATIENT
Start: 2022-10-24

## 2022-10-24 NOTE — PATIENT INSTRUCTIONS
- Start prescription cough syrup, guaifenesin-codeine. Use at night.  - Start cough capsules, benzonatate. Take 3 times daily as needed for cough  - [place] exemption form filled out  - Follow up in 5-6 months (March/April) for Medicare Wellness visit; follow up earlier as needed. It was a pleasure seeing you in the clinic today. Thank you for choosing the [facility] office for your healthcare needs. Please call at [phone] with any questions or concerns.     Francis Stewart MD

## 2022-11-28 ENCOUNTER — TELEPHONE (OUTPATIENT)
Dept: INTERNAL MEDICINE CLINIC | Facility: CLINIC | Age: 67
End: 2022-11-28

## 2022-11-28 DIAGNOSIS — E89.0 POSTOPERATIVE HYPOTHYROIDISM: Primary | ICD-10-CM

## 2022-11-28 NOTE — TELEPHONE ENCOUNTER
Referral needed to endo routine fol/up, hypothyroidism   Last discussed with RP 10/24/2022    Cydney Shook MD as Consulting Physician (ENDOCRINOLOGY)    [address] [place] [address]

## 2022-11-29 NOTE — TELEPHONE ENCOUNTER
RP - referral pended, if appropriate, please advise, ty    Checked pt's [employer] web site (Donna Li name not coming up for referral).    Doctor Elin Denver is listed under [facility] Endocrinology [place] [id number] additional information from previous referral.

## 2022-11-30 NOTE — TELEPHONE ENCOUNTER
Referral signed/entered for Dr. Yudy Valencia, she comes up under Internal Medicine in epic but is an endocrinologist

## 2022-11-30 NOTE — TELEPHONE ENCOUNTER
Sent [facility] to the pt in [place] and [place] informing her that referral has been placed, is in open status, and how to monitor this through [address]

## 2022-12-06 ENCOUNTER — PATIENT MESSAGE (OUTPATIENT)
Dept: ORTHOPEDICS CLINIC | Facility: CLINIC | Age: 67
End: 2022-12-06

## 2022-12-07 DIAGNOSIS — K21.9 GASTROESOPHAGEAL REFLUX DISEASE WITHOUT ESOPHAGITIS: Chronic | ICD-10-CM

## 2022-12-07 DIAGNOSIS — R05.3 PERSISTENT COUGH: ICD-10-CM

## 2022-12-08 RX ORDER — PANTOPRAZOLE SODIUM 20 MG/1
20 TABLET, DELAYED RELEASE ORAL
Qty: 90 TABLET | Refills: 1 | Status: SHIPPED | OUTPATIENT
Start: 2022-12-08

## 2022-12-08 RX ORDER — CODEINE PHOSPHATE AND GUAIFENESIN 10; 100 MG/5ML; MG/5ML
5 SOLUTION ORAL NIGHTLY PRN
Qty: 118 ML | Refills: 0 | Status: SHIPPED | OUTPATIENT
Start: 2022-12-08

## 2022-12-13 ENCOUNTER — OFFICE VISIT (OUTPATIENT)
Dept: INTERNAL MEDICINE CLINIC | Facility: CLINIC | Age: 67
End: 2022-12-13
Payer: MEDICARE

## 2022-12-13 VITALS
OXYGEN SATURATION: 98 % | HEART RATE: 98 BPM | SYSTOLIC BLOOD PRESSURE: 120 MMHG | TEMPERATURE: 98 F | HEIGHT: 66 IN | RESPIRATION RATE: 16 BRPM | BODY MASS INDEX: 32.78 KG/M2 | WEIGHT: 204 LBS | DIASTOLIC BLOOD PRESSURE: 72 MMHG

## 2022-12-13 DIAGNOSIS — R07.89 CHEST PRESSURE: Primary | ICD-10-CM

## 2022-12-13 DIAGNOSIS — Z23 NEED FOR IMMUNIZATION AGAINST INFLUENZA: ICD-10-CM

## 2022-12-13 DIAGNOSIS — I10 PRIMARY HYPERTENSION: ICD-10-CM

## 2022-12-13 DIAGNOSIS — E89.0 POSTOPERATIVE HYPOTHYROIDISM: ICD-10-CM

## 2022-12-13 DIAGNOSIS — M79.602 LEFT ARM PAIN: ICD-10-CM

## 2022-12-13 DIAGNOSIS — D32.9 MENINGIOMA (HCC): ICD-10-CM

## 2022-12-13 DIAGNOSIS — R07.89 ATYPICAL CHEST PAIN: ICD-10-CM

## 2022-12-13 PROCEDURE — 90662 IIV NO PRSV INCREASED AG IM: CPT | Performed by: INTERNAL MEDICINE

## 2022-12-13 PROCEDURE — 3074F SYST BP LT 130 MM HG: CPT | Performed by: INTERNAL MEDICINE

## 2022-12-13 PROCEDURE — 3078F DIAST BP <80 MM HG: CPT | Performed by: INTERNAL MEDICINE

## 2022-12-13 PROCEDURE — 3008F BODY MASS INDEX DOCD: CPT | Performed by: INTERNAL MEDICINE

## 2022-12-13 PROCEDURE — G0008 ADMIN INFLUENZA VIRUS VAC: HCPCS | Performed by: INTERNAL MEDICINE

## 2022-12-13 PROCEDURE — 99214 OFFICE O/P EST MOD 30 MIN: CPT | Performed by: INTERNAL MEDICINE

## 2022-12-13 NOTE — PATIENT INSTRUCTIONS
- Schedule stress test.  Call central scheduling at [phone] to schedule  - In the meantime, you can use meloxicam as needed if chest pain/arm pain comes back  - Thyroid biopsies are pretty common for thyroid nodules. You can schedule thyroid biopsy for after your [place] trip. - Go to emergency department if you have any severe chest pain that does not get better after 10-15 minutes. - Flu shot given today      It was a pleasure seeing you in the clinic today. Thank you for choosing the [facility] office for your healthcare needs. Please call at [phone] with any questions or concerns.     Bouchra Alvarez MD

## 2023-01-12 ENCOUNTER — HOSPITAL ENCOUNTER (OUTPATIENT)
Dept: CV DIAGNOSTICS | Age: 68
Discharge: HOME | End: 2023-01-12
Attending: INTERNAL MEDICINE
Payer: MEDICARE

## 2023-01-12 DIAGNOSIS — R07.89 CHEST PRESSURE: ICD-10-CM

## 2023-01-12 DIAGNOSIS — M79.602 LEFT ARM PAIN: ICD-10-CM

## 2023-01-12 DIAGNOSIS — I10 PRIMARY HYPERTENSION: ICD-10-CM

## 2023-01-12 DIAGNOSIS — R07.89 ATYPICAL CHEST PAIN: ICD-10-CM

## 2023-01-12 PROCEDURE — 93017 CV STRESS TEST TRACING ONLY: CPT | Performed by: INTERNAL MEDICINE

## 2023-01-12 PROCEDURE — 93018 CV STRESS TEST I&R ONLY: CPT | Performed by: INTERNAL MEDICINE

## 2023-01-12 PROCEDURE — 93350 STRESS TTE ONLY: CPT | Performed by: INTERNAL MEDICINE

## 2023-01-14 RX ORDER — CALCITRIOL 0.5 UG/1
0.5 CAPSULE, LIQUID FILLED ORAL DAILY
Qty: 90 CAPSULE | Refills: 1 | Status: CANCELLED | OUTPATIENT
Start: 2023-01-14

## 2023-01-14 RX ORDER — CALCITRIOL 0.5 UG/1
0.5 CAPSULE, LIQUID FILLED ORAL DAILY
Qty: 90 CAPSULE | Refills: 1 | Status: SHIPPED | OUTPATIENT
Start: 2023-01-14

## 2023-01-17 ENCOUNTER — TELEPHONE (OUTPATIENT)
Dept: INTERNAL MEDICINE CLINIC | Facility: CLINIC | Age: 68
End: 2023-01-17

## 2023-01-17 NOTE — TELEPHONE ENCOUNTER
Pt requesting for her Card Echo Stress test to be reviewed and to discuss test results     Please advise

## 2023-01-17 NOTE — TELEPHONE ENCOUNTER
It has not been finalized/signed by the cardiologist but his initial report says that her stress test was normal.  If the final report changes will let her know. For now she should follow up/let us know if chest pain comes back.

## 2023-01-19 ENCOUNTER — OFFICE VISIT (OUTPATIENT)
Dept: NEUROLOGY | Facility: CLINIC | Age: 68
End: 2023-01-19
Payer: MEDICARE

## 2023-01-19 VITALS
DIASTOLIC BLOOD PRESSURE: 66 MMHG | SYSTOLIC BLOOD PRESSURE: 120 MMHG | RESPIRATION RATE: 16 BRPM | WEIGHT: 205.31 LBS | HEART RATE: 78 BPM | BODY MASS INDEX: 33 KG/M2

## 2023-01-19 DIAGNOSIS — D32.9 MENINGIOMA (HCC): Primary | ICD-10-CM

## 2023-01-19 DIAGNOSIS — Z86.73 HISTORY OF STROKE: ICD-10-CM

## 2023-01-19 PROCEDURE — 3078F DIAST BP <80 MM HG: CPT | Performed by: OTHER

## 2023-01-19 PROCEDURE — 99213 OFFICE O/P EST LOW 20 MIN: CPT | Performed by: OTHER

## 2023-01-19 PROCEDURE — 3074F SYST BP LT 130 MM HG: CPT | Performed by: OTHER

## 2023-01-25 ENCOUNTER — TELEPHONE (OUTPATIENT)
Dept: SURGERY | Facility: CLINIC | Age: 68
End: 2023-01-25

## 2023-01-25 NOTE — TELEPHONE ENCOUNTER
Ernesto April on [id number]. Informed her that [facility] no longer does the PA for imaging and that the PA goes through [facility].  She stated she would give them a call.

## 2023-01-25 NOTE — TELEPHONE ENCOUNTER
Omid Francois from central scheduling would like to verify if authorization for MRI Brain has been authorized states they would like to get patient scheduled tomorrow for sooner appointment please contact directly [phone]

## 2023-02-06 ENCOUNTER — HOSPITAL ENCOUNTER (OUTPATIENT)
Dept: MRI IMAGING | Facility: HOSPITAL | Age: 68
Discharge: HOME | End: 2023-02-06
Attending: Other
Payer: MEDICARE

## 2023-02-06 DIAGNOSIS — D32.9 MENINGIOMA (HCC): ICD-10-CM

## 2023-02-06 DIAGNOSIS — N64.4 BREAST PAIN, LEFT: Primary | ICD-10-CM

## 2023-02-06 PROCEDURE — 70553 MRI BRAIN STEM W/O & W/DYE: CPT | Performed by: OTHER

## 2023-02-06 PROCEDURE — A9575 INJ GADOTERATE MEGLUMI 0.1ML: HCPCS | Performed by: OTHER

## 2023-02-06 RX ORDER — GADOTERATE MEGLUMINE 376.9 MG/ML
30 INJECTION INTRAVENOUS
Status: COMPLETED | OUTPATIENT
Start: 2023-02-06 | End: 2023-02-06

## 2023-02-06 RX ADMIN — GADOTERATE MEGLUMINE 27 ML: 376.9 INJECTION INTRAVENOUS at 14:06:00

## 2023-02-06 NOTE — PROGRESS NOTES
Patient has been dealing with pain in left breast.  Going on intermittently for several months if not longer. Unremarkable mammogram September 2022. Will have patient follow up with breast surgery specialists.

## 2023-02-14 ENCOUNTER — OFFICE VISIT (OUTPATIENT)
Facility: LOCATION | Age: 68
End: 2023-02-14
Payer: MEDICARE

## 2023-02-14 ENCOUNTER — OFFICE VISIT (OUTPATIENT)
Dept: HEMATOLOGY/ONCOLOGY | Facility: HOSPITAL | Age: 68
End: 2023-02-14
Attending: SURGERY
Payer: MEDICARE

## 2023-02-14 VITALS
SYSTOLIC BLOOD PRESSURE: 137 MMHG | TEMPERATURE: 98 F | RESPIRATION RATE: 20 BRPM | OXYGEN SATURATION: 97 % | DIASTOLIC BLOOD PRESSURE: 84 MMHG | WEIGHT: 208.5 LBS | HEART RATE: 92 BPM | BODY MASS INDEX: 34 KG/M2

## 2023-02-14 DIAGNOSIS — Z80.3 FAMILY HISTORY OF BREAST CANCER: ICD-10-CM

## 2023-02-14 DIAGNOSIS — N64.4 MASTODYNIA OF LEFT BREAST: Primary | ICD-10-CM

## 2023-02-14 DIAGNOSIS — M79.622 LEFT AXILLARY PAIN: ICD-10-CM

## 2023-02-14 PROCEDURE — 99211 OFF/OP EST MAY X REQ PHY/QHP: CPT

## 2023-02-14 PROCEDURE — 99203 OFFICE O/P NEW LOW 30 MIN: CPT | Performed by: SURGERY

## 2023-02-14 NOTE — PROGRESS NOTES
Patient presents to clinic for breast consultation referred by internal medicine doctor for LFT breast pain. [facility] NEO 2D+3D screening bilateral views performed on 9/6/22. Negative assessment. Follow up in 12 months. Spouse Clarence present at visit. Patient reports she has had LFT breast pain that radiates to arm for for over six months. Rates left breast pain at time a 8 out of 10 on the pain scale. Denies any bleeding, discharge, redness, or swelling in breast. Patient's two sisters have hx of breast cancer. One of her sisters had a mastectomy last year at the age of 39 along with chemotherapy. Unsure of other's sister treatment course. Both alive and well. Medication and allergies reviewed and updated.

## 2023-02-17 ENCOUNTER — TELEPHONE (OUTPATIENT)
Dept: INTERNAL MEDICINE CLINIC | Facility: CLINIC | Age: 68
End: 2023-02-17

## 2023-02-17 DIAGNOSIS — K21.9 GASTROESOPHAGEAL REFLUX DISEASE WITHOUT ESOPHAGITIS: Primary | ICD-10-CM

## 2023-02-17 DIAGNOSIS — K57.30 DIVERTICULOSIS OF COLON: ICD-10-CM

## 2023-02-17 DIAGNOSIS — Z86.010 PERSONAL HISTORY OF COLONIC POLYPS: ICD-10-CM

## 2023-02-17 DIAGNOSIS — K64.8 INTERNAL HEMORRHOIDS: ICD-10-CM

## 2023-02-17 NOTE — TELEPHONE ENCOUNTER
RP - more information from the pt - awaiting her call back    [phone] spoke to pt (in [place]) - she stated she missed her last appt with RP due to covid. For the past 1-2 weeks, pt is having severe pain in her stomach area, she feels it is related to her GERD. Pt stated she does not like the doctors in the [address] [place] [address] or [place] areas. Pt wants to stay with an [facility] provider. Pt stated [place] location would be ok. Gave pt the names and phone numbers of Dr. Merlyn Saldaña and Dr. Adam Perry.  Pt is going to call to see if they can get her in soon, then call back for urgent referral.

## 2023-02-17 NOTE — TELEPHONE ENCOUNTER
Patient is calling to request a referral for GI, she asked for any gi as long as they have appointments soon. Informed patient that we have no way of knowing that. Explained the referral process in detail. Also informed her that the referral department is short staffed and behind and it will not be an immediate authorization. Also explained that we will call her to let her know once referral is entered but that doesn't meant it will be authorized. We would let her know when it is authorized and she can schedule an appointment.

## 2023-02-17 NOTE — TELEPHONE ENCOUNTER
What is the GI referral for?  Dr. Herman Honeycutt is a liver failure/liver transplant specialist, not a general GI specialist.  She could check with local GI groups to see who has soonest openings and then we can enter a referral for approval.  Brodie Tanner/Robi  [phone] [facility] locations [phone] [place] [phone]

## 2023-02-18 ENCOUNTER — APPOINTMENT (OUTPATIENT)
Dept: ULTRASOUND IMAGING | Age: 68
End: 2023-02-18
Attending: EMERGENCY MEDICINE
Payer: MEDICARE

## 2023-02-18 ENCOUNTER — HOSPITAL ENCOUNTER (EMERGENCY)
Age: 68
Discharge: HOME | End: 2023-02-18
Attending: EMERGENCY MEDICINE
Payer: MEDICARE

## 2023-02-18 ENCOUNTER — APPOINTMENT (OUTPATIENT)
Dept: GENERAL RADIOLOGY | Age: 68
End: 2023-02-18
Attending: EMERGENCY MEDICINE
Payer: MEDICARE

## 2023-02-18 ENCOUNTER — APPOINTMENT (OUTPATIENT)
Dept: CT IMAGING | Age: 68
End: 2023-02-18
Attending: EMERGENCY MEDICINE
Payer: MEDICARE

## 2023-02-18 VITALS
BODY MASS INDEX: 32.14 KG/M2 | SYSTOLIC BLOOD PRESSURE: 143 MMHG | DIASTOLIC BLOOD PRESSURE: 61 MMHG | OXYGEN SATURATION: 99 % | TEMPERATURE: 97 F | RESPIRATION RATE: 16 BRPM | HEIGHT: 66 IN | HEART RATE: 81 BPM | WEIGHT: 200 LBS

## 2023-02-18 DIAGNOSIS — K27.9 PUD (PEPTIC ULCER DISEASE): Primary | ICD-10-CM

## 2023-02-18 LAB
ALBUMIN SERPL-MCNC: 3.2 G/DL (ref 3.4–5)
ALBUMIN/GLOB SERPL: 0.8 {RATIO} (ref 1–2)
ALP LIVER SERPL-CCNC: 86 U/L
ALT SERPL-CCNC: 16 U/L
ANION GAP SERPL CALC-SCNC: 6 MMOL/L (ref 0–18)
AST SERPL-CCNC: 11 U/L (ref 15–37)
ATRIAL RATE: 85 BPM
BASOPHILS # BLD AUTO: 0.02 X10(3) UL (ref 0–0.2)
BASOPHILS NFR BLD AUTO: 0.1 %
BILIRUB SERPL-MCNC: 0.4 MG/DL (ref 0.1–2)
BILIRUB UR QL STRIP.AUTO: NEGATIVE
BUN BLD-MCNC: 11 MG/DL (ref 7–18)
CALCIUM BLD-MCNC: 8.5 MG/DL (ref 8.5–10.1)
CHLORIDE SERPL-SCNC: 106 MMOL/L (ref 98–112)
CO2 SERPL-SCNC: 26 MMOL/L (ref 21–32)
COLOR UR AUTO: YELLOW
CREAT BLD-MCNC: 0.69 MG/DL
EOSINOPHIL # BLD AUTO: 5.25 X10(3) UL (ref 0–0.7)
EOSINOPHIL NFR BLD AUTO: 35 %
ERYTHROCYTE [DISTWIDTH] IN BLOOD BY AUTOMATED COUNT: 12.6 %
GFR SERPLBLD BASED ON 1.73 SQ M-ARVRAT: 95 ML/MIN/1.73M2 (ref 60–?)
GLOBULIN PLAS-MCNC: 4 G/DL (ref 2.8–4.4)
GLUCOSE BLD-MCNC: 144 MG/DL (ref 70–99)
GLUCOSE UR STRIP.AUTO-MCNC: NEGATIVE MG/DL
HCT VFR BLD AUTO: 39.3 %
HGB BLD-MCNC: 13.1 G/DL
IMM GRANULOCYTES # BLD AUTO: 0.04 X10(3) UL (ref 0–1)
IMM GRANULOCYTES NFR BLD: 0.3 %
KETONES UR STRIP.AUTO-MCNC: NEGATIVE MG/DL
LIPASE SERPL-CCNC: 26 U/L (ref 13–75)
LIPASE SERPL-CCNC: 98 U/L (ref 73–393)
LYMPHOCYTES # BLD AUTO: 2.36 X10(3) UL (ref 1–4)
LYMPHOCYTES NFR BLD AUTO: 15.7 %
MCH RBC QN AUTO: 27.9 PG (ref 26–34)
MCHC RBC AUTO-ENTMCNC: 33.3 G/DL (ref 31–37)
MCV RBC AUTO: 83.8 FL
MONOCYTES # BLD AUTO: 0.45 X10(3) UL (ref 0.1–1)
MONOCYTES NFR BLD AUTO: 3 %
NEUTROPHILS # BLD AUTO: 6.87 X10 (3) UL (ref 1.5–7.7)
NEUTROPHILS # BLD AUTO: 6.87 X10(3) UL (ref 1.5–7.7)
NEUTROPHILS NFR BLD AUTO: 45.9 %
NITRITE UR QL STRIP.AUTO: NEGATIVE
OSMOLALITY SERPL CALC.SUM OF ELEC: 288 MOSM/KG (ref 275–295)
P AXIS: 32 DEGREES
P-R INTERVAL: 148 MS
PH UR STRIP.AUTO: 6.5 [PH] (ref 5–8)
PLATELET # BLD AUTO: 364 10(3)UL (ref 150–450)
POTASSIUM SERPL-SCNC: 3.8 MMOL/L (ref 3.5–5.1)
PROT SERPL-MCNC: 7.2 G/DL (ref 6.4–8.2)
PROT UR STRIP.AUTO-MCNC: NEGATIVE MG/DL
Q-T INTERVAL: 374 MS
QRS DURATION: 80 MS
QTC CALCULATION (BEZET): 445 MS
R AXIS: 9 DEGREES
RBC # BLD AUTO: 4.69 X10(6)UL
SODIUM SERPL-SCNC: 138 MMOL/L (ref 136–145)
SP GR UR STRIP.AUTO: 1.01 (ref 1–1.03)
T AXIS: 21 DEGREES
TROPONIN I HIGH SENSITIVITY: 6 NG/L
UROBILINOGEN UR STRIP.AUTO-MCNC: 0.2 MG/DL
VENTRICULAR RATE: 85 BPM
WBC # BLD AUTO: 15 X10(3) UL (ref 4–11)

## 2023-02-18 PROCEDURE — 87086 URINE CULTURE/COLONY COUNT: CPT | Performed by: EMERGENCY MEDICINE

## 2023-02-18 PROCEDURE — 84484 ASSAY OF TROPONIN QUANT: CPT | Performed by: EMERGENCY MEDICINE

## 2023-02-18 PROCEDURE — 76700 US EXAM ABDOM COMPLETE: CPT | Performed by: EMERGENCY MEDICINE

## 2023-02-18 PROCEDURE — 81001 URINALYSIS AUTO W/SCOPE: CPT | Performed by: EMERGENCY MEDICINE

## 2023-02-18 PROCEDURE — 36415 COLL VENOUS BLD VENIPUNCTURE: CPT

## 2023-02-18 PROCEDURE — 99285 EMERGENCY DEPT VISIT HI MDM: CPT

## 2023-02-18 PROCEDURE — 71045 X-RAY EXAM CHEST 1 VIEW: CPT | Performed by: EMERGENCY MEDICINE

## 2023-02-18 PROCEDURE — 93005 ELECTROCARDIOGRAM TRACING: CPT

## 2023-02-18 PROCEDURE — 93010 ELECTROCARDIOGRAM REPORT: CPT

## 2023-02-18 PROCEDURE — 81015 MICROSCOPIC EXAM OF URINE: CPT | Performed by: EMERGENCY MEDICINE

## 2023-02-18 PROCEDURE — 85025 COMPLETE CBC W/AUTO DIFF WBC: CPT | Performed by: EMERGENCY MEDICINE

## 2023-02-18 PROCEDURE — 83690 ASSAY OF LIPASE: CPT | Performed by: EMERGENCY MEDICINE

## 2023-02-18 PROCEDURE — 74177 CT ABD & PELVIS W/CONTRAST: CPT | Performed by: EMERGENCY MEDICINE

## 2023-02-18 PROCEDURE — 80053 COMPREHEN METABOLIC PANEL: CPT | Performed by: EMERGENCY MEDICINE

## 2023-02-18 RX ORDER — PANTOPRAZOLE SODIUM 20 MG/1
20 TABLET, DELAYED RELEASE ORAL
Qty: 60 TABLET | Refills: 0 | Status: SHIPPED | OUTPATIENT
Start: 2023-02-18 | End: 2023-03-20

## 2023-02-20 NOTE — TELEPHONE ENCOUNTER
RP- Please advise, patient asking if you had any further recommendations to manage her pain prior to her OV with you 2/21.      -seen at ER 2/18, peptic ulcer disease    Patient returned call. Reports her abdominal pain has not improved. Rates pain as a \"10\" out of \"10\" and prevent her from sleeping last night. Confirms she has been taking pantoprazole as recommended by ER, and called to get appointment with Dr. Christa Boyce but next available appointment not until May (and \"will also need a referral\"). Taking water without difficulty but food makes pain worse. Denies nausea or vomiting, last bowel movement today \"semi-formed\" no blood.

## 2023-02-20 NOTE — TELEPHONE ENCOUNTER
Spoke with patient via phone. Informed of RP's recommendations. Patient verbalized understanding. Patient had not contacted the [facility] Provided patient with following information:     -Dr. Raghu Hung: [phone]  -[place]: Gera Palafox [address]: [phone]    Patient confirms she will contact numbers provided and then will let our office know which provider can see her so a referral can be placed. Patient denies further questions.

## 2023-02-20 NOTE — TELEPHONE ENCOUNTER
Did she follow up with scheduling with the [address] [place] [address] specialists? She can try over the counter famotidine between doses of the pantoprazole she was prescribed by the ED.

## 2023-02-20 NOTE — TELEPHONE ENCOUNTER
Call placed to patient to further triage using language line [facility]). LMTCOB. Awaiting return call.

## 2023-02-20 NOTE — TELEPHONE ENCOUNTER
Patient calling because she was seen at the ER yesterday for PUD (peptic ulcer disease). She states she is still in pain and would like to know what she can do to help her pain until her appt tomorrow.

## 2023-02-21 ENCOUNTER — OFFICE VISIT (OUTPATIENT)
Dept: INTERNAL MEDICINE CLINIC | Facility: CLINIC | Age: 68
End: 2023-02-21
Payer: MEDICARE

## 2023-02-21 VITALS
HEART RATE: 87 BPM | TEMPERATURE: 98 F | HEIGHT: 64.75 IN | WEIGHT: 205.63 LBS | SYSTOLIC BLOOD PRESSURE: 124 MMHG | DIASTOLIC BLOOD PRESSURE: 74 MMHG | RESPIRATION RATE: 16 BRPM | OXYGEN SATURATION: 96 % | BODY MASS INDEX: 34.68 KG/M2

## 2023-02-21 DIAGNOSIS — Z86.73 HISTORY OF ARTERIAL ISCHEMIC STROKE: ICD-10-CM

## 2023-02-21 DIAGNOSIS — F33.0 MILD EPISODE OF RECURRENT MAJOR DEPRESSIVE DISORDER (HCC): Chronic | ICD-10-CM

## 2023-02-21 DIAGNOSIS — R53.82 CHRONIC FATIGUE: ICD-10-CM

## 2023-02-21 DIAGNOSIS — M21.371 RIGHT FOOT DROP: ICD-10-CM

## 2023-02-21 DIAGNOSIS — G57.12 MERALGIA PARESTHETICA OF LEFT SIDE: ICD-10-CM

## 2023-02-21 DIAGNOSIS — D72.10 EOSINOPHILIA, UNSPECIFIED TYPE: ICD-10-CM

## 2023-02-21 DIAGNOSIS — F51.04 CHRONIC INSOMNIA: ICD-10-CM

## 2023-02-21 DIAGNOSIS — K27.9 PEPTIC ULCER DISEASE: ICD-10-CM

## 2023-02-21 DIAGNOSIS — D32.9 MENINGIOMA (HCC): ICD-10-CM

## 2023-02-21 DIAGNOSIS — F41.1 GAD (GENERALIZED ANXIETY DISORDER): ICD-10-CM

## 2023-02-21 DIAGNOSIS — E55.9 VITAMIN D DEFICIENCY: ICD-10-CM

## 2023-02-21 DIAGNOSIS — Z86.010 PERSONAL HISTORY OF COLONIC POLYPS: ICD-10-CM

## 2023-02-21 DIAGNOSIS — I10 PRIMARY HYPERTENSION: Chronic | ICD-10-CM

## 2023-02-21 DIAGNOSIS — M15.9 PRIMARY OSTEOARTHRITIS INVOLVING MULTIPLE JOINTS: Chronic | ICD-10-CM

## 2023-02-21 DIAGNOSIS — E89.0 POSTOPERATIVE HYPOTHYROIDISM: Chronic | ICD-10-CM

## 2023-02-21 DIAGNOSIS — M85.89 OSTEOPENIA OF MULTIPLE SITES: Chronic | ICD-10-CM

## 2023-02-21 DIAGNOSIS — Z96.651 HISTORY OF REVISION OF TOTAL REPLACEMENT OF RIGHT KNEE JOINT: ICD-10-CM

## 2023-02-21 DIAGNOSIS — K64.8 INTERNAL HEMORRHOIDS: ICD-10-CM

## 2023-02-21 DIAGNOSIS — K57.30 DIVERTICULOSIS OF COLON: ICD-10-CM

## 2023-02-21 DIAGNOSIS — K21.9 GASTROESOPHAGEAL REFLUX DISEASE WITHOUT ESOPHAGITIS: Chronic | ICD-10-CM

## 2023-02-21 DIAGNOSIS — Z00.00 PREVENTATIVE HEALTH CARE: Primary | ICD-10-CM

## 2023-02-21 PROBLEM — M79.604 RIGHT LEG PAIN: Status: RESOLVED | Noted: 2020-08-19 | Resolved: 2023-02-21

## 2023-02-21 PROCEDURE — 3008F BODY MASS INDEX DOCD: CPT | Performed by: INTERNAL MEDICINE

## 2023-02-21 PROCEDURE — 99397 PER PM REEVAL EST PAT 65+ YR: CPT | Performed by: INTERNAL MEDICINE

## 2023-02-21 PROCEDURE — 1125F AMNT PAIN NOTED PAIN PRSNT: CPT | Performed by: INTERNAL MEDICINE

## 2023-02-21 PROCEDURE — 3078F DIAST BP <80 MM HG: CPT | Performed by: INTERNAL MEDICINE

## 2023-02-21 PROCEDURE — 96160 PT-FOCUSED HLTH RISK ASSMT: CPT | Performed by: INTERNAL MEDICINE

## 2023-02-21 PROCEDURE — G0439 PPPS, SUBSEQ VISIT: HCPCS | Performed by: INTERNAL MEDICINE

## 2023-02-21 PROCEDURE — 3074F SYST BP LT 130 MM HG: CPT | Performed by: INTERNAL MEDICINE

## 2023-02-21 PROCEDURE — 1111F DSCHRG MED/CURRENT MED MERGE: CPT | Performed by: INTERNAL MEDICINE

## 2023-02-21 RX ORDER — SUCRALFATE ORAL 1 G/10ML
1 SUSPENSION ORAL
Qty: 414 ML | Refills: 0 | Status: SHIPPED | OUTPATIENT
Start: 2023-02-21 | End: 2023-02-21

## 2023-02-21 RX ORDER — FAMOTIDINE 40 MG/1
40 TABLET, FILM COATED ORAL 2 TIMES DAILY
Qty: 60 TABLET | Refills: 1 | Status: SHIPPED | OUTPATIENT
Start: 2023-02-21

## 2023-02-21 RX ORDER — ALPRAZOLAM 0.25 MG/1
0.25 TABLET ORAL 2 TIMES DAILY PRN
Qty: 60 TABLET | Refills: 0 | Status: SHIPPED | OUTPATIENT
Start: 2023-02-21

## 2023-02-21 NOTE — PATIENT INSTRUCTIONS
- Start famotidine for stomach pain. Take 1 tablet twice daily.  - Start sucralfate liquid for stomach pain. Take 3 times daily with meals and also take at night before bed. - Follow up with Dr. Saud Gresham. They should call you to schedule an appointment  - Alprazolam refilled  [facility] exemption form filled out  - Get repeat blood count test done when you are feeling better (do not have to fast)  - Schedule bone density scan (DEXA scan) for August 21st or later. It was a pleasure seeing you in the clinic today. Thank you for choosing the [facility] office for your healthcare needs. Please call at [phone] with any questions or concerns.     Meredith Cooper MD

## 2023-02-24 ENCOUNTER — TELEPHONE (OUTPATIENT)
Dept: INTERNAL MEDICINE CLINIC | Facility: CLINIC | Age: 68
End: 2023-02-24

## 2023-02-24 DIAGNOSIS — K21.9 GASTROESOPHAGEAL REFLUX DISEASE WITHOUT ESOPHAGITIS: ICD-10-CM

## 2023-02-24 DIAGNOSIS — K27.9 PEPTIC ULCER DISEASE: Primary | ICD-10-CM

## 2023-02-24 NOTE — TELEPHONE ENCOUNTER
Pt is requesting referral to see a Gastroenterologis.       [address]    Souleymane Rosen MD     [address] [place] [address]  [facility], [address] [place] [address] Rd    [phone]

## 2023-02-24 NOTE — TELEPHONE ENCOUNTER
I had already referred her to Dr. King Mullen who speaks Ukrainian but she missed her appointment with him I believe due to her having COVID at the time. Unfortunately I don't know of any other local Martiniquais speaking GI specialists. She may want to call Eli directly as they may have this information.

## 2023-02-24 NOTE — TELEPHONE ENCOUNTER
RP - do you have a new recommendation for pt, who speaks Namibian? Please advise, ty! Referral department notification that GI referral to Dr. Josias Petersen was denied as OON.

## 2023-03-01 ENCOUNTER — LAB ENCOUNTER (OUTPATIENT)
Dept: LAB | Age: 68
End: 2023-03-01
Attending: NURSE PRACTITIONER
Payer: MEDICARE

## 2023-03-01 DIAGNOSIS — R11.0 NAUSEA: ICD-10-CM

## 2023-03-01 DIAGNOSIS — R10.13 EPIGASTRIC PAIN: ICD-10-CM

## 2023-03-01 DIAGNOSIS — R14.0 BLOATING: ICD-10-CM

## 2023-03-01 DIAGNOSIS — R10.84 ABDOMINAL PAIN, GENERALIZED: ICD-10-CM

## 2023-03-01 DIAGNOSIS — R19.7 DIARRHEA, UNSPECIFIED TYPE: ICD-10-CM

## 2023-03-01 DIAGNOSIS — R93.3 ABNORMAL FINDING ON GI TRACT IMAGING: ICD-10-CM

## 2023-03-01 LAB
ALBUMIN SERPL-MCNC: 3.5 G/DL (ref 3.4–5)
ALBUMIN/GLOB SERPL: 0.9 {RATIO} (ref 1–2)
ALP LIVER SERPL-CCNC: 80 U/L
ALT SERPL-CCNC: 20 U/L
ANION GAP SERPL CALC-SCNC: 1 MMOL/L (ref 0–18)
AST SERPL-CCNC: 12 U/L (ref 15–37)
BASOPHILS # BLD AUTO: 0.04 X10(3) UL (ref 0–0.2)
BASOPHILS NFR BLD AUTO: 0.3 %
BILIRUB SERPL-MCNC: 0.5 MG/DL (ref 0.1–2)
BUN BLD-MCNC: 7 MG/DL (ref 7–18)
CALCIUM BLD-MCNC: 8.8 MG/DL (ref 8.5–10.1)
CHLORIDE SERPL-SCNC: 108 MMOL/L (ref 98–112)
CO2 SERPL-SCNC: 27 MMOL/L (ref 21–32)
CREAT BLD-MCNC: 0.64 MG/DL
CRP SERPL-MCNC: 0.69 MG/DL (ref ?–0.3)
EOSINOPHIL # BLD AUTO: 5.96 X10(3) UL (ref 0–0.7)
EOSINOPHIL NFR BLD AUTO: 40.4 %
ERYTHROCYTE [DISTWIDTH] IN BLOOD BY AUTOMATED COUNT: 12.6 %
FASTING STATUS PATIENT QL REPORTED: NO
GFR SERPLBLD BASED ON 1.73 SQ M-ARVRAT: 97 ML/MIN/1.73M2 (ref 60–?)
GLOBULIN PLAS-MCNC: 4 G/DL (ref 2.8–4.4)
GLUCOSE BLD-MCNC: 103 MG/DL (ref 70–99)
HCT VFR BLD AUTO: 41.6 %
HGB BLD-MCNC: 13.7 G/DL
IMM GRANULOCYTES # BLD AUTO: 0.04 X10(3) UL (ref 0–1)
IMM GRANULOCYTES NFR BLD: 0.3 %
LIPASE SERPL-CCNC: 26 U/L (ref 13–75)
LIPASE SERPL-CCNC: 98 U/L (ref 73–393)
LYMPHOCYTES # BLD AUTO: 2.97 X10(3) UL (ref 1–4)
LYMPHOCYTES NFR BLD AUTO: 20.1 %
MCH RBC QN AUTO: 27.5 PG (ref 26–34)
MCHC RBC AUTO-ENTMCNC: 32.9 G/DL (ref 31–37)
MCV RBC AUTO: 83.5 FL
MONOCYTES # BLD AUTO: 0.58 X10(3) UL (ref 0.1–1)
MONOCYTES NFR BLD AUTO: 3.9 %
NEUTROPHILS # BLD AUTO: 5.15 X10 (3) UL (ref 1.5–7.7)
NEUTROPHILS # BLD AUTO: 5.15 X10(3) UL (ref 1.5–7.7)
NEUTROPHILS NFR BLD AUTO: 35 %
OSMOLALITY SERPL CALC.SUM OF ELEC: 280 MOSM/KG (ref 275–295)
PLATELET # BLD AUTO: 443 10(3)UL (ref 150–450)
POTASSIUM SERPL-SCNC: 4 MMOL/L (ref 3.5–5.1)
PROT SERPL-MCNC: 7.5 G/DL (ref 6.4–8.2)
RBC # BLD AUTO: 4.98 X10(6)UL
SODIUM SERPL-SCNC: 136 MMOL/L (ref 136–145)
WBC # BLD AUTO: 14.7 X10(3) UL (ref 4–11)

## 2023-03-01 PROCEDURE — 86140 C-REACTIVE PROTEIN: CPT

## 2023-03-01 PROCEDURE — 36415 COLL VENOUS BLD VENIPUNCTURE: CPT

## 2023-03-01 PROCEDURE — 80053 COMPREHEN METABOLIC PANEL: CPT

## 2023-03-01 PROCEDURE — 85025 COMPLETE CBC W/AUTO DIFF WBC: CPT

## 2023-03-01 PROCEDURE — 83690 ASSAY OF LIPASE: CPT

## 2023-03-02 ENCOUNTER — HOSPITAL ENCOUNTER (INPATIENT)
Facility: HOSPITAL | Age: 68
LOS: 1 days | Discharge: HOME | End: 2023-03-04
Attending: STUDENT IN AN ORGANIZED HEALTH CARE EDUCATION/TRAINING PROGRAM | Admitting: INTERNAL MEDICINE
Payer: MEDICARE

## 2023-03-02 ENCOUNTER — APPOINTMENT (OUTPATIENT)
Dept: CT IMAGING | Facility: HOSPITAL | Age: 68
End: 2023-03-02
Attending: STUDENT IN AN ORGANIZED HEALTH CARE EDUCATION/TRAINING PROGRAM
Payer: MEDICARE

## 2023-03-02 ENCOUNTER — HOSPITAL ENCOUNTER (OUTPATIENT)
Facility: HOSPITAL | Age: 68
Setting detail: OBSERVATION
Discharge: HOME | End: 2023-03-04
Attending: STUDENT IN AN ORGANIZED HEALTH CARE EDUCATION/TRAINING PROGRAM | Admitting: INTERNAL MEDICINE
Payer: MEDICARE

## 2023-03-02 DIAGNOSIS — K29.70 GASTRITIS WITHOUT BLEEDING, UNSPECIFIED CHRONICITY, UNSPECIFIED GASTRITIS TYPE: ICD-10-CM

## 2023-03-02 DIAGNOSIS — I88.0 MESENTERIC ADENITIS: ICD-10-CM

## 2023-03-02 DIAGNOSIS — R10.10 PAIN OF UPPER ABDOMEN: ICD-10-CM

## 2023-03-02 DIAGNOSIS — R18.8 OTHER ASCITES: Primary | ICD-10-CM

## 2023-03-02 DIAGNOSIS — R93.5 ABNORMAL CT OF THE ABDOMEN: ICD-10-CM

## 2023-03-02 LAB
ALBUMIN SERPL-MCNC: 3.6 G/DL (ref 3.4–5)
ALBUMIN/GLOB SERPL: 0.9 {RATIO} (ref 1–2)
ALP LIVER SERPL-CCNC: 74 U/L
ALT SERPL-CCNC: 20 U/L
ANION GAP SERPL CALC-SCNC: 4 MMOL/L (ref 0–18)
AST SERPL-CCNC: 12 U/L (ref 15–37)
BASOPHILS # BLD AUTO: 0.03 X10(3) UL (ref 0–0.2)
BASOPHILS NFR BLD AUTO: 0.2 %
BILIRUB SERPL-MCNC: 0.4 MG/DL (ref 0.1–2)
BUN BLD-MCNC: 10 MG/DL (ref 7–18)
CALCIUM BLD-MCNC: 8.9 MG/DL (ref 8.5–10.1)
CHLORIDE SERPL-SCNC: 107 MMOL/L (ref 98–112)
CO2 SERPL-SCNC: 27 MMOL/L (ref 21–32)
CREAT BLD-MCNC: 0.69 MG/DL
EOSINOPHIL # BLD AUTO: 7.12 X10(3) UL (ref 0–0.7)
EOSINOPHIL NFR BLD AUTO: 45.1 %
ERYTHROCYTE [DISTWIDTH] IN BLOOD BY AUTOMATED COUNT: 12.6 %
GFR SERPLBLD BASED ON 1.73 SQ M-ARVRAT: 95 ML/MIN/1.73M2 (ref 60–?)
GLOBULIN PLAS-MCNC: 4.1 G/DL (ref 2.8–4.4)
GLUCOSE BLD-MCNC: 99 MG/DL (ref 70–99)
HCT VFR BLD AUTO: 40.4 %
HGB BLD-MCNC: 13.4 G/DL
IMM GRANULOCYTES # BLD AUTO: 0.05 X10(3) UL (ref 0–1)
IMM GRANULOCYTES NFR BLD: 0.3 %
LIPASE SERPL-CCNC: 127 U/L (ref 73–393)
LIPASE SERPL-CCNC: 34 U/L (ref 13–75)
LYMPHOCYTES # BLD AUTO: 3.3 X10(3) UL (ref 1–4)
LYMPHOCYTES NFR BLD AUTO: 20.9 %
MCH RBC QN AUTO: 28.2 PG (ref 26–34)
MCHC RBC AUTO-ENTMCNC: 33.2 G/DL (ref 31–37)
MCV RBC AUTO: 85.1 FL
MONOCYTES # BLD AUTO: 0.58 X10(3) UL (ref 0.1–1)
MONOCYTES NFR BLD AUTO: 3.7 %
NEUTROPHILS # BLD AUTO: 4.7 X10 (3) UL (ref 1.5–7.7)
NEUTROPHILS # BLD AUTO: 4.7 X10(3) UL (ref 1.5–7.7)
NEUTROPHILS NFR BLD AUTO: 29.8 %
OSMOLALITY SERPL CALC.SUM OF ELEC: 285 MOSM/KG (ref 275–295)
PLATELET # BLD AUTO: 386 10(3)UL (ref 150–450)
POTASSIUM SERPL-SCNC: 3.9 MMOL/L (ref 3.5–5.1)
PROT SERPL-MCNC: 7.7 G/DL (ref 6.4–8.2)
RBC # BLD AUTO: 4.75 X10(6)UL
SARS-COV-2 RNA RESP QL NAA+PROBE: NOT DETECTED
SODIUM SERPL-SCNC: 138 MMOL/L (ref 136–145)
WBC # BLD AUTO: 15.8 X10(3) UL (ref 4–11)

## 2023-03-02 PROCEDURE — 0DB68ZX EXCISION OF STOMACH, VIA NATURAL OR ARTIFICIAL OPENING ENDOSCOPIC, DIAGNOSTIC: ICD-10-PCS | Performed by: STUDENT IN AN ORGANIZED HEALTH CARE EDUCATION/TRAINING PROGRAM

## 2023-03-02 PROCEDURE — 74177 CT ABD & PELVIS W/CONTRAST: CPT | Performed by: STUDENT IN AN ORGANIZED HEALTH CARE EDUCATION/TRAINING PROGRAM

## 2023-03-02 PROCEDURE — 0DB98ZX EXCISION OF DUODENUM, VIA NATURAL OR ARTIFICIAL OPENING ENDOSCOPIC, DIAGNOSTIC: ICD-10-PCS | Performed by: STUDENT IN AN ORGANIZED HEALTH CARE EDUCATION/TRAINING PROGRAM

## 2023-03-02 PROCEDURE — 99222 1ST HOSP IP/OBS MODERATE 55: CPT | Performed by: INTERNAL MEDICINE

## 2023-03-02 RX ORDER — MORPHINE SULFATE 4 MG/ML
4 INJECTION, SOLUTION INTRAMUSCULAR; INTRAVENOUS ONCE
Status: DISCONTINUED | OUTPATIENT
Start: 2023-03-02 | End: 2023-03-04

## 2023-03-02 NOTE — ED INITIAL ASSESSMENT (HPI)
A&Ox3 ambulatory patient p/w abdominal pain x2 weeks    Was seen by PMD yesterday and had blood work done, called today and referred to ED for abnormal results, WBC 14.7

## 2023-03-03 ENCOUNTER — ANESTHESIA EVENT (OUTPATIENT)
Dept: ENDOSCOPY | Facility: HOSPITAL | Age: 68
End: 2023-03-03
Payer: MEDICARE

## 2023-03-03 PROBLEM — K29.70 GASTRITIS WITHOUT BLEEDING, UNSPECIFIED CHRONICITY, UNSPECIFIED GASTRITIS TYPE: Status: ACTIVE | Noted: 2023-03-03

## 2023-03-03 PROBLEM — I88.0 MESENTERIC ADENITIS: Status: ACTIVE | Noted: 2023-03-03

## 2023-03-03 LAB
ALBUMIN SERPL-MCNC: 3 G/DL (ref 3.4–5)
ALBUMIN/GLOB SERPL: 0.8 {RATIO} (ref 1–2)
ALP LIVER SERPL-CCNC: 68 U/L
ALT SERPL-CCNC: 17 U/L
ANION GAP SERPL CALC-SCNC: 4 MMOL/L (ref 0–18)
AST SERPL-CCNC: 9 U/L (ref 15–37)
BASOPHILS # BLD AUTO: 0.03 X10(3) UL (ref 0–0.2)
BASOPHILS NFR BLD AUTO: 0.2 %
BILIRUB SERPL-MCNC: 0.6 MG/DL (ref 0.1–2)
BUN BLD-MCNC: 8 MG/DL (ref 7–18)
CALCIUM BLD-MCNC: 8.4 MG/DL (ref 8.5–10.1)
CHLORIDE SERPL-SCNC: 110 MMOL/L (ref 98–112)
CO2 SERPL-SCNC: 24 MMOL/L (ref 21–32)
CREAT BLD-MCNC: 0.56 MG/DL
EOSINOPHIL # BLD AUTO: 7.57 X10(3) UL (ref 0–0.7)
EOSINOPHIL NFR BLD AUTO: 50.9 %
ERYTHROCYTE [DISTWIDTH] IN BLOOD BY AUTOMATED COUNT: 12.6 %
GFR SERPLBLD BASED ON 1.73 SQ M-ARVRAT: 100 ML/MIN/1.73M2 (ref 60–?)
GLOBULIN PLAS-MCNC: 3.6 G/DL (ref 2.8–4.4)
GLUCOSE BLD-MCNC: 99 MG/DL (ref 70–99)
HCT VFR BLD AUTO: 37 %
HGB BLD-MCNC: 12.6 G/DL
IMM GRANULOCYTES # BLD AUTO: 0.02 X10(3) UL (ref 0–1)
IMM GRANULOCYTES NFR BLD: 0.1 %
LYMPHOCYTES # BLD AUTO: 2.8 X10(3) UL (ref 1–4)
LYMPHOCYTES NFR BLD AUTO: 18.8 %
MAGNESIUM SERPL-MCNC: 1.8 MG/DL (ref 1.6–2.6)
MCH RBC QN AUTO: 28.6 PG (ref 26–34)
MCHC RBC AUTO-ENTMCNC: 34.1 G/DL (ref 31–37)
MCV RBC AUTO: 83.9 FL
MONOCYTES # BLD AUTO: 0.62 X10(3) UL (ref 0.1–1)
MONOCYTES NFR BLD AUTO: 4.2 %
NEUTROPHILS # BLD AUTO: 3.84 X10 (3) UL (ref 1.5–7.7)
NEUTROPHILS # BLD AUTO: 3.84 X10(3) UL (ref 1.5–7.7)
NEUTROPHILS NFR BLD AUTO: 25.8 %
OSMOLALITY SERPL CALC.SUM OF ELEC: 284 MOSM/KG (ref 275–295)
PHOSPHATE SERPL-MCNC: 3.5 MG/DL (ref 2.5–4.9)
PLATELET # BLD AUTO: 366 10(3)UL (ref 150–450)
POTASSIUM SERPL-SCNC: 3.7 MMOL/L (ref 3.5–5.1)
PROT SERPL-MCNC: 6.6 G/DL (ref 6.4–8.2)
RBC # BLD AUTO: 4.41 X10(6)UL
SODIUM SERPL-SCNC: 138 MMOL/L (ref 136–145)
WBC # BLD AUTO: 14.9 X10(3) UL (ref 4–11)

## 2023-03-03 PROCEDURE — 99232 SBSQ HOSP IP/OBS MODERATE 35: CPT | Performed by: INTERNAL MEDICINE

## 2023-03-03 RX ORDER — METRONIDAZOLE 500 MG/100ML
500 INJECTION, SOLUTION INTRAVENOUS EVERY 8 HOURS
Status: DISCONTINUED | OUTPATIENT
Start: 2023-03-03 | End: 2023-03-04

## 2023-03-03 RX ORDER — HEPARIN SODIUM 5000 [USP'U]/ML
5000 INJECTION, SOLUTION INTRAVENOUS; SUBCUTANEOUS EVERY 12 HOURS SCHEDULED
Status: DISCONTINUED | OUTPATIENT
Start: 2023-03-03 | End: 2023-03-04

## 2023-03-03 RX ORDER — ALPRAZOLAM 0.25 MG/1
0.25 TABLET ORAL 2 TIMES DAILY PRN
Status: DISCONTINUED | OUTPATIENT
Start: 2023-03-03 | End: 2023-03-04

## 2023-03-03 RX ORDER — MAGNESIUM OXIDE 400 MG/1
400 TABLET ORAL ONCE
Status: COMPLETED | OUTPATIENT
Start: 2023-03-03 | End: 2023-03-03

## 2023-03-03 RX ORDER — MELATONIN
3 NIGHTLY PRN
Status: DISCONTINUED | OUTPATIENT
Start: 2023-03-03 | End: 2023-03-04

## 2023-03-03 RX ORDER — ONDANSETRON 2 MG/ML
4 INJECTION INTRAMUSCULAR; INTRAVENOUS EVERY 6 HOURS PRN
Status: DISCONTINUED | OUTPATIENT
Start: 2023-03-03 | End: 2023-03-04

## 2023-03-03 RX ORDER — LOSARTAN POTASSIUM 100 MG/1
100 TABLET ORAL DAILY
Status: DISCONTINUED | OUTPATIENT
Start: 2023-03-03 | End: 2023-03-04

## 2023-03-03 RX ORDER — MORPHINE SULFATE 2 MG/ML
1 INJECTION, SOLUTION INTRAMUSCULAR; INTRAVENOUS EVERY 2 HOUR PRN
Status: DISCONTINUED | OUTPATIENT
Start: 2023-03-03 | End: 2023-03-04

## 2023-03-03 RX ORDER — FAMOTIDINE 20 MG/1
40 TABLET, FILM COATED ORAL 2 TIMES DAILY
Status: DISCONTINUED | OUTPATIENT
Start: 2023-03-03 | End: 2023-03-03

## 2023-03-03 RX ORDER — PANTOPRAZOLE SODIUM 20 MG/1
20 TABLET, DELAYED RELEASE ORAL
Status: DISCONTINUED | OUTPATIENT
Start: 2023-03-03 | End: 2023-03-03

## 2023-03-03 RX ORDER — DICYCLOMINE HYDROCHLORIDE 10 MG/1
10 CAPSULE ORAL 3 TIMES DAILY PRN
Status: DISCONTINUED | OUTPATIENT
Start: 2023-03-03 | End: 2023-03-04

## 2023-03-03 RX ORDER — MORPHINE SULFATE 2 MG/ML
2 INJECTION, SOLUTION INTRAMUSCULAR; INTRAVENOUS EVERY 2 HOUR PRN
Status: DISCONTINUED | OUTPATIENT
Start: 2023-03-03 | End: 2023-03-04

## 2023-03-03 RX ORDER — MORPHINE SULFATE 4 MG/ML
4 INJECTION, SOLUTION INTRAMUSCULAR; INTRAVENOUS EVERY 2 HOUR PRN
Status: DISCONTINUED | OUTPATIENT
Start: 2023-03-03 | End: 2023-03-04

## 2023-03-03 RX ORDER — SUCRALFATE 1 G/1
1 TABLET ORAL
Status: DISCONTINUED | OUTPATIENT
Start: 2023-03-03 | End: 2023-03-04

## 2023-03-03 RX ORDER — SODIUM CHLORIDE 9 MG/ML
INJECTION, SOLUTION INTRAVENOUS CONTINUOUS
Status: DISCONTINUED | OUTPATIENT
Start: 2023-03-03 | End: 2023-03-04

## 2023-03-03 NOTE — ED QUICK NOTES
Orders for admission, patient is aware of plan and ready to go upstairs. Any questions, please call ED RN Jacqui at [address].      Patient Covid vaccination status: Fully vaccinated     COVID Test Ordered in ED: Rapid SARS-CoV-2 by PCR    COVID Suspicion at Admission: Low clinical suspicion for COVID    Running Infusions:  None    Mental Status/LOC at time of transport: a&ox3    Other pertinent information: ambulatory independently w/steady gait  CIWA score: N/A   NIH score:  N/A

## 2023-03-03 NOTE — PROGRESS NOTES
03/03/23 0735   Clinical Encounter Type   Visited With Patient   Routine Visit Introduction   Taxonomy   Intended Effects Build relationship of care and support   Methods Offer support   Interventions Acknowledge current situation;Assist someone with Advance Directives      checked in with nurse for [facility] discussion request. Patient alert and aware. Nurse advised patient wanted information.  dropped off "[employer] paperwork and advised patient to contact once she is ready for further discussion.  remains available for ongoing support. Spiritual Care support can be requested via an Epic consult. MUNIRA Nagy. Div  Extension:[phone]

## 2023-03-03 NOTE — PLAN OF CARE
NURSING ADMISSION NOTE    Patient admitted via Cart  Oriented to room. Safety precautions initiated. Bed in low position. Call light in reach. Assumed care of patient at midnight. Navigator completed. Pt a/o x4. VSS. Afebrile. Denies pain. Abdomen rounded & non-tender upon assessment. Pt stated pain in LUQ prior. Started on CL diet. Stool sample pending for c diff & culture, isolation in place.  at bedside & aware of POC. Per Dr. Soraida Howe note, GI consult in ED. Decrease in appetite & poor fluid intake per pt. Pt expressed anxiety, lack of interest, & feeling down/ depressed within last 2 week period. Pt stated she feels it is in relation to onset of not feeling well. Consult placed to psych liaison. Call light within reach. Safety precautions in place.      Problem: GASTROINTESTINAL - ADULT  Goal: Minimal or absence of nausea and vomiting  Description: INTERVENTIONS:  - Maintain adequate hydration with IV or PO as ordered and tolerated  - Nasogastric tube to low intermittent suction as ordered  - Evaluate effectiveness of ordered antiemetic medications  - Provide nonpharmacologic comfort measures as appropriate  - Advance diet as tolerated, if ordered  - Obtain nutritional consult as needed  - Evaluate fluid balance  Outcome: Progressing     Problem: GASTROINTESTINAL - ADULT  Goal: Maintains or returns to baseline bowel function  Description: INTERVENTIONS:  - Assess bowel function  - Maintain adequate hydration with IV or PO as ordered and tolerated  - Evaluate effectiveness of GI medications  - Encourage mobilization and activity  - Obtain nutritional consult as needed  - Establish a toileting routine/schedule  - Consider collaborating with pharmacy to review patient's medication profile  Outcome: Progressing     Problem: PAIN - ADULT  Goal: Verbalizes/displays adequate comfort level or patient's stated pain goal  Description: INTERVENTIONS:  - Encourage pt to monitor pain and request assistance  - Assess pain using appropriate pain scale  - Administer analgesics based on type and severity of pain and evaluate response  - Implement non-pharmacological measures as appropriate and evaluate response  - Consider cultural and social influences on pain and pain management  - Manage/alleviate anxiety  - Utilize distraction and/or relaxation techniques  - Monitor for opioid side effects  - Notify MD/LIP if interventions unsuccessful or patient reports new pain  - Anticipate increased pain with activity and pre-medicate as appropriate  Outcome: Progressing

## 2023-03-04 ENCOUNTER — ANESTHESIA (OUTPATIENT)
Dept: ENDOSCOPY | Facility: HOSPITAL | Age: 68
End: 2023-03-04
Payer: MEDICARE

## 2023-03-04 VITALS
TEMPERATURE: 98 F | HEART RATE: 71 BPM | RESPIRATION RATE: 18 BRPM | OXYGEN SATURATION: 98 % | SYSTOLIC BLOOD PRESSURE: 132 MMHG | BODY MASS INDEX: 34 KG/M2 | WEIGHT: 200 LBS | DIASTOLIC BLOOD PRESSURE: 77 MMHG

## 2023-03-04 LAB — MAGNESIUM SERPL-MCNC: 1.9 MG/DL (ref 1.6–2.6)

## 2023-03-04 PROCEDURE — 99239 HOSP IP/OBS DSCHRG MGMT >30: CPT | Performed by: INTERNAL MEDICINE

## 2023-03-04 RX ORDER — LIDOCAINE HYDROCHLORIDE 10 MG/ML
INJECTION, SOLUTION EPIDURAL; INFILTRATION; INTRACAUDAL; PERINEURAL AS NEEDED
Status: DISCONTINUED | OUTPATIENT
Start: 2023-03-04 | End: 2023-03-04 | Stop reason: SURG

## 2023-03-04 RX ORDER — PANTOPRAZOLE SODIUM 40 MG/1
40 TABLET, DELAYED RELEASE ORAL
Qty: 60 TABLET | Refills: 2 | Status: SHIPPED | OUTPATIENT
Start: 2023-03-04 | End: 2023-06-02

## 2023-03-04 RX ORDER — SODIUM CHLORIDE, SODIUM LACTATE, POTASSIUM CHLORIDE, CALCIUM CHLORIDE 600; 310; 30; 20 MG/100ML; MG/100ML; MG/100ML; MG/100ML
INJECTION, SOLUTION INTRAVENOUS CONTINUOUS
OUTPATIENT
Start: 2023-03-04

## 2023-03-04 RX ORDER — SODIUM CHLORIDE, SODIUM LACTATE, POTASSIUM CHLORIDE, CALCIUM CHLORIDE 600; 310; 30; 20 MG/100ML; MG/100ML; MG/100ML; MG/100ML
INJECTION, SOLUTION INTRAVENOUS CONTINUOUS PRN
Status: DISCONTINUED | OUTPATIENT
Start: 2023-03-04 | End: 2023-03-04 | Stop reason: SURG

## 2023-03-04 RX ORDER — NALOXONE HYDROCHLORIDE 0.4 MG/ML
80 INJECTION, SOLUTION INTRAMUSCULAR; INTRAVENOUS; SUBCUTANEOUS AS NEEDED
OUTPATIENT
Start: 2023-03-04 | End: 2023-03-04

## 2023-03-04 RX ADMIN — SODIUM CHLORIDE, SODIUM LACTATE, POTASSIUM CHLORIDE, CALCIUM CHLORIDE: 600; 310; 30; 20 INJECTION, SOLUTION INTRAVENOUS at 11:25:00

## 2023-03-04 RX ADMIN — LIDOCAINE HYDROCHLORIDE 50 MG: 10 INJECTION, SOLUTION EPIDURAL; INFILTRATION; INTRACAUDAL; PERINEURAL at 11:25:00

## 2023-03-04 NOTE — PROGRESS NOTES
Brief GI Update   - Patient feels better this afternoon after EGD   - Results provided to patient   - No worrisome lesions, biopsies pending   - Pt tolerating soft diet   - OK for discharge and can pursue further testing as outpatient based on symptoms   - d/c with BID PPI, since she has been receiving this as inpatient with improvement in symptoms (though reportedly didn't see much improvement as inpatient)    Marilu Putnam MD

## 2023-03-04 NOTE — PLAN OF CARE
Problem: GASTROINTESTINAL - ADULT  Goal: Minimal or absence of nausea and vomiting  Description: INTERVENTIONS:  - Maintain adequate hydration with IV or PO as ordered and tolerated  - Nasogastric tube to low intermittent suction as ordered  - Evaluate effectiveness of ordered antiemetic medications  - Provide nonpharmacologic comfort measures as appropriate  - Advance diet as tolerated, if ordered  - Obtain nutritional consult as needed  - Evaluate fluid balance  Outcome: Progressing  Goal: Maintains or returns to baseline bowel function  Description: INTERVENTIONS:  - Assess bowel function  - Maintain adequate hydration with IV or PO as ordered and tolerated  - Evaluate effectiveness of GI medications  - Encourage mobilization and activity  - Obtain nutritional consult as needed  - Establish a toileting routine/schedule  - Consider collaborating with pharmacy to review patient's medication profile  Outcome: Progressing     Problem: PAIN - ADULT  Goal: Verbalizes/displays adequate comfort level or patient's stated pain goal  Description: INTERVENTIONS:  - Encourage pt to monitor pain and request assistance  - Assess pain using appropriate pain scale  - Administer analgesics based on type and severity of pain and evaluate response  - Implement non-pharmacological measures as appropriate and evaluate response  - Consider cultural and social influences on pain and pain management  - Manage/alleviate anxiety  - Utilize distraction and/or relaxation techniques  - Monitor for opioid side effects  - Notify MD/LIP if interventions unsuccessful or patient reports new pain  - Anticipate increased pain with activity and pre-medicate as appropriate  Outcome: Progressing     Patient alert and oriented X4, able to communicate, speak and read effectively in [address] and declines need for . Operative consent form signed by patient and placed on chart, pt declines need for German form or .  Patient voids, had one formed BM during shift, no diarrhea. R/O C. Diff in place. IVF as ordered, 2nd IV placed in L FA per patient request. Patient tolerating CLD, will be NPO MN. Patient updated progressing and in agreement with POC.

## 2023-03-04 NOTE — PROGRESS NOTES
Pt is npo for egd in the am. Pt has no complaints of pain and ivf running. Pt has call light in reach.

## 2023-03-04 NOTE — OPERATIVE REPORT
EGD operative report  Patient Name: Shane Brown  Procedure: Esophagogastroduodenoscopy with biopsy   Indication: diffuse upper abdominal pain, abnormal CT scan  Attending: Jovana Scott M.D. Consent:  The risks, benefits, and alternatives were discussed with the patient / POA. Risks included, but were not limited to, bleeding, perforation, medication effects, cardiac arrhythmias, and aspiration. After all questions were answered to their satisfaction, a signed, informed, and witnessed consent was obtained. Sedation: Monitored Anesthesia Care  Monitoring:  Pulsoximetry, pulse, respirations, and blood pressure were monitored throughout the entire procedure  Procedure: After achieving adequate sedation and placing the patient in the left lateral decubitus position, the lubricated upper endoscope was introduced into the mouth and advanced to the descending duodenum. The endoscope was then withdrawn into the gastric antrum and placed in a retroflexed position. The endoscope was then righted, and air was suctioned from the stomach. The endoscope was then withdrawn from the patient, with careful visual inspection of the mucosa revealing no additional pathologic findings. The patient tolerated the procedure without apparent procedural complications. The patient left the procedure room in stable condition for recovery. Findings:    Esophagus: The mucosa was normal.  There is no esophagitis or ulceration. GE junction: Small 1 cm hiatal hernia. Stomach: There are no ruggae or folds noted in the gastric body or fundus, suspicious for chronic gastritis. The gastric body, antrum, fundus, cardia, and angularis were otherwise normal.  Biopsies were obtained from the antrum, body, and fundus, to evaluate for H.pylori. Duodenum: There is mucosal edema throughout the 2nd portion of the duodenum.   Biopsies were obtained from the distal and proximal duodenum to evaluate for Celiac sprue or other small bowel pathology. Impression: Findings as above. Recommendations:   1. Though there are some non-specific inflammatory changes noted, unclear etiology and not definitive that these are the cause for patient's significant abdominal pain. 2. Resume diet  3. Continue PPI twice daily  4. Await pathology  5.  Consider CT enterography for better visualization of the more distal small bowel to see if similar inflammatory changes are noted throughout    Terell Cuevas MD

## 2023-03-05 NOTE — PLAN OF CARE
Pt. A&O x4. VSS. Afebrile. Pt. Returning from EGD; tolerated procedure well. Pt. Tolerating diet well; no c/o pain or N/V. Pt. Able to ambulate to the bathroom independently. Call light within reach. Will continue to monitor. Pt. Cleared for discharge per MD's. Discharge instructions given. Pt. Verbalized understanding. IVs removed. All belongings sent with patient.       Problem: GASTROINTESTINAL - ADULT  Goal: Minimal or absence of nausea and vomiting  Description: INTERVENTIONS:  - Maintain adequate hydration with IV or PO as ordered and tolerated  - Nasogastric tube to low intermittent suction as ordered  - Evaluate effectiveness of ordered antiemetic medications  - Provide nonpharmacologic comfort measures as appropriate  - Advance diet as tolerated, if ordered  - Obtain nutritional consult as needed  - Evaluate fluid balance  Outcome: Progressing  Goal: Maintains or returns to baseline bowel function  Description: INTERVENTIONS:  - Assess bowel function  - Maintain adequate hydration with IV or PO as ordered and tolerated  - Evaluate effectiveness of GI medications  - Encourage mobilization and activity  - Obtain nutritional consult as needed  - Establish a toileting routine/schedule  - Consider collaborating with pharmacy to review patient's medication profile  Outcome: Progressing     Problem: PAIN - ADULT  Goal: Verbalizes/displays adequate comfort level or patient's stated pain goal  Description: INTERVENTIONS:  - Encourage pt to monitor pain and request assistance  - Assess pain using appropriate pain scale  - Administer analgesics based on type and severity of pain and evaluate response  - Implement non-pharmacological measures as appropriate and evaluate response  - Consider cultural and social influences on pain and pain management  - Manage/alleviate anxiety  - Utilize distraction and/or relaxation techniques  - Monitor for opioid side effects  - Notify MD/LIP if interventions unsuccessful or patient reports new pain  - Anticipate increased pain with activity and pre-medicate as appropriate  Outcome: Progressing

## 2023-03-06 ENCOUNTER — PATIENT OUTREACH (OUTPATIENT)
Dept: CASE MANAGEMENT | Age: 68
End: 2023-03-06

## 2023-03-06 DIAGNOSIS — Z02.9 ENCOUNTERS FOR UNSPECIFIED ADMINISTRATIVE PURPOSE: ICD-10-CM

## 2023-03-06 NOTE — PROGRESS NOTES
Attempted to reach the patient to complete a [facility] FU call. Left a message for the pt to call the NCM back at, [phone]. The number for the [facility] was also given to the patient to schedule: [phone].

## 2023-03-08 ENCOUNTER — LAB ENCOUNTER (OUTPATIENT)
Dept: LAB | Age: 68
End: 2023-03-08
Attending: INTERNAL MEDICINE
Payer: MEDICARE

## 2023-03-08 DIAGNOSIS — D72.10 EOSINOPHILIA, UNSPECIFIED TYPE: ICD-10-CM

## 2023-03-08 LAB
BASOPHILS # BLD: 0 X10(3) UL (ref 0–0.2)
BASOPHILS NFR BLD: 0 %
EOSINOPHIL # BLD: 4.1 X10(3) UL (ref 0–0.7)
EOSINOPHIL NFR BLD: 35 %
ERYTHROCYTE [DISTWIDTH] IN BLOOD BY AUTOMATED COUNT: 13 %
HCT VFR BLD AUTO: 40.1 %
HGB BLD-MCNC: 13.2 G/DL
LYMPHOCYTES NFR BLD: 1.99 X10(3) UL (ref 1–4)
LYMPHOCYTES NFR BLD: 17 %
MCH RBC QN AUTO: 28.3 PG (ref 26–34)
MCHC RBC AUTO-ENTMCNC: 32.9 G/DL (ref 31–37)
MCV RBC AUTO: 86.1 FL
MONOCYTES # BLD: 0.47 X10(3) UL (ref 0.1–1)
MONOCYTES NFR BLD: 4 %
MORPHOLOGY: NORMAL
NEUTROPHILS # BLD AUTO: 3.72 X10 (3) UL (ref 1.5–7.7)
NEUTROPHILS NFR BLD: 44 %
NEUTS HYPERSEG # BLD: 5.15 X10(3) UL (ref 1.5–7.7)
PLATELET # BLD AUTO: 362 10(3)UL (ref 150–450)
PLATELET MORPHOLOGY: NORMAL
RBC # BLD AUTO: 4.66 X10(6)UL
TOTAL CELLS COUNTED BLD: 100
WBC # BLD AUTO: 11.7 X10(3) UL (ref 4–11)

## 2023-03-08 PROCEDURE — 85025 COMPLETE CBC W/AUTO DIFF WBC: CPT

## 2023-03-08 PROCEDURE — 85027 COMPLETE CBC AUTOMATED: CPT

## 2023-03-08 PROCEDURE — 85007 BL SMEAR W/DIFF WBC COUNT: CPT

## 2023-03-08 PROCEDURE — 36415 COLL VENOUS BLD VENIPUNCTURE: CPT

## 2023-03-09 ENCOUNTER — LAB ENCOUNTER (OUTPATIENT)
Dept: LAB | Age: 68
End: 2023-03-09
Attending: INTERNAL MEDICINE
Payer: MEDICARE

## 2023-03-09 ENCOUNTER — OFFICE VISIT (OUTPATIENT)
Dept: INTERNAL MEDICINE CLINIC | Facility: CLINIC | Age: 68
End: 2023-03-09
Payer: MEDICARE

## 2023-03-09 VITALS
HEIGHT: 64 IN | DIASTOLIC BLOOD PRESSURE: 74 MMHG | HEART RATE: 78 BPM | WEIGHT: 202 LBS | RESPIRATION RATE: 16 BRPM | BODY MASS INDEX: 34.49 KG/M2 | SYSTOLIC BLOOD PRESSURE: 124 MMHG | TEMPERATURE: 98 F | OXYGEN SATURATION: 96 %

## 2023-03-09 DIAGNOSIS — R10.10 PAIN OF UPPER ABDOMEN: ICD-10-CM

## 2023-03-09 DIAGNOSIS — D72.10 EOSINOPHILIA, UNSPECIFIED TYPE: Primary | ICD-10-CM

## 2023-03-09 DIAGNOSIS — R73.01 ELEVATED FASTING GLUCOSE: ICD-10-CM

## 2023-03-09 DIAGNOSIS — D72.10 EOSINOPHILIA, UNSPECIFIED TYPE: ICD-10-CM

## 2023-03-09 DIAGNOSIS — K76.0 FATTY LIVER: ICD-10-CM

## 2023-03-09 LAB
EST. AVERAGE GLUCOSE BLD GHB EST-MCNC: 114 MG/DL (ref 68–126)
HBA1C MFR BLD: 5.6 % (ref ?–5.7)
IGA SERPL-MCNC: 173 MG/DL (ref 70–312)

## 2023-03-09 PROCEDURE — 86364 TISS TRNSGLTMNASE EA IG CLAS: CPT

## 2023-03-09 PROCEDURE — 86003 ALLG SPEC IGE CRUDE XTRC EA: CPT

## 2023-03-09 PROCEDURE — 3078F DIAST BP <80 MM HG: CPT | Performed by: INTERNAL MEDICINE

## 2023-03-09 PROCEDURE — 99495 TRANSJ CARE MGMT MOD F2F 14D: CPT | Performed by: INTERNAL MEDICINE

## 2023-03-09 PROCEDURE — 3008F BODY MASS INDEX DOCD: CPT | Performed by: INTERNAL MEDICINE

## 2023-03-09 PROCEDURE — 82785 ASSAY OF IGE: CPT

## 2023-03-09 PROCEDURE — 82784 ASSAY IGA/IGD/IGG/IGM EACH: CPT

## 2023-03-09 PROCEDURE — 36415 COLL VENOUS BLD VENIPUNCTURE: CPT

## 2023-03-09 PROCEDURE — 83036 HEMOGLOBIN GLYCOSYLATED A1C: CPT

## 2023-03-09 PROCEDURE — 3074F SYST BP LT 130 MM HG: CPT | Performed by: INTERNAL MEDICINE

## 2023-03-09 NOTE — PATIENT INSTRUCTIONS
- Get blood tests done today to look into blood sugar and food allergies  - Keep eating healthy  - See printout on fatty liver. It was a pleasure seeing you in the clinic today. Thank you for choosing the [facility] office for your healthcare needs. Please call at [phone] with any questions or concerns.     Bernabe Husain MD

## 2023-03-10 LAB
CLAM IGE QN: <0.1 KUA/L (ref ?–0.1)
CODFISH IGE QN: <0.1 KUA/L (ref ?–0.1)
CORN IGE QN: <0.1 KUA/L (ref ?–0.1)
COW MILK IGE QN: 0.34 KUA/L (ref ?–0.1)
EGG WHITE IGE QN: <0.1 KUA/L (ref ?–0.1)
IGE SERPL-ACNC: 666 KU/L (ref 2–214)
PEANUT IGE QN: <0.1 KUA/L (ref ?–0.1)
SCALLOP IGE QN: 0.1 KUA/L (ref ?–0.1)
SESAME SEED IGE QN: <0.1 KUA/L (ref ?–0.1)
SHRIMP IGE QN: 0.88 KUA/L (ref ?–0.1)
SOYBEAN IGE QN: <0.1 KUA/L (ref ?–0.1)
TTG IGA SER-ACNC: 0.2 U/ML (ref ?–7)
WALNUT IGE QN: <0.1 KUA/L (ref ?–0.1)
WHEAT IGE QN: 0.11 KUA/L (ref ?–0.1)

## 2023-03-12 NOTE — PROGRESS NOTES
Biopsies from EGD reviewed. Atypical findings, unclear cause. - Dr Nancy Plascencia ordered Celiac markers, very helpful, but unfortunately neg.  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 - Patient had reported symptom improvement in the hospital on PPI therapy. At time of her follow-up with Oscar Peoples NP on 3/22/2023, we need to determine:            A) If symptoms resolved, continue PPI and repeat EGD with biopsies            B) If symptom persist, stop PPI, start budesonide capsules 9 mg, repeat EGD in 4 weeks            C) Confirm she is not using NSAIDs. Msg routed to NP and PCP as an FYI.     Pan Flaherty MD

## 2023-03-17 NOTE — PROGRESS NOTES
Multiple attempts to reach pt and messages left with no return call. Patient went in for HFU appt with PCP on 3/9/23. Encounter closing.

## 2023-03-31 ENCOUNTER — OFFICE VISIT (OUTPATIENT)
Dept: INTERNAL MEDICINE CLINIC | Facility: CLINIC | Age: 68
End: 2023-03-31
Payer: MEDICARE

## 2023-03-31 VITALS
WEIGHT: 195 LBS | HEART RATE: 75 BPM | SYSTOLIC BLOOD PRESSURE: 122 MMHG | DIASTOLIC BLOOD PRESSURE: 72 MMHG | BODY MASS INDEX: 33.29 KG/M2 | OXYGEN SATURATION: 96 % | TEMPERATURE: 98 F | RESPIRATION RATE: 16 BRPM | HEIGHT: 64 IN

## 2023-03-31 DIAGNOSIS — R10.11 POSTPRANDIAL RUQ PAIN: ICD-10-CM

## 2023-03-31 DIAGNOSIS — E03.9 HYPOTHYROIDISM, UNSPECIFIED TYPE: Chronic | ICD-10-CM

## 2023-03-31 DIAGNOSIS — R10.84 GENERALIZED ABDOMINAL PAIN: Primary | ICD-10-CM

## 2023-03-31 DIAGNOSIS — K76.0 FATTY LIVER: ICD-10-CM

## 2023-03-31 DIAGNOSIS — I10 PRIMARY HYPERTENSION: Chronic | ICD-10-CM

## 2023-03-31 PROCEDURE — 1111F DSCHRG MED/CURRENT MED MERGE: CPT | Performed by: INTERNAL MEDICINE

## 2023-03-31 PROCEDURE — 3078F DIAST BP <80 MM HG: CPT | Performed by: INTERNAL MEDICINE

## 2023-03-31 PROCEDURE — 3074F SYST BP LT 130 MM HG: CPT | Performed by: INTERNAL MEDICINE

## 2023-03-31 PROCEDURE — 3008F BODY MASS INDEX DOCD: CPT | Performed by: INTERNAL MEDICINE

## 2023-03-31 PROCEDURE — 99214 OFFICE O/P EST MOD 30 MIN: CPT | Performed by: INTERNAL MEDICINE

## 2023-03-31 RX ORDER — PANTOPRAZOLE SODIUM 20 MG/1
TABLET, DELAYED RELEASE ORAL
COMMUNITY
Start: 2023-03-22 | End: 2023-03-31

## 2023-03-31 NOTE — PATIENT INSTRUCTIONS
- Schedule HIDA scan (gallbladder test). Call central scheduling at [phone] to schedule. - See which of these liver doctors can see you soonest.  Call us and I will enter a referral:  Dr. Ashia Avila [phone] (they come to [facility] a few times a month but you need to call their downtown office):  [address] Hunt [address], [address] [facility]  [phone]    Dr. Bianca Sullivanah/Linda  [address]  [address] [place], [address] [address]  [phone]    Dr. Anne Monge/Vanessa  [address] [facility], [address] [place] [address] “” [address]  [phone]    It was a pleasure seeing you in the clinic today. Thank you for choosing the [facility] office for your healthcare needs. Please call at [phone] with any questions or concerns.     Francis Stewart MD

## 2023-04-04 ENCOUNTER — HOSPITAL ENCOUNTER (OUTPATIENT)
Dept: MAMMOGRAPHY | Age: 68
Discharge: HOME | End: 2023-04-04
Attending: SURGERY
Payer: MEDICARE

## 2023-04-04 ENCOUNTER — HOSPITAL ENCOUNTER (OUTPATIENT)
Dept: ULTRASOUND IMAGING | Age: 68
Discharge: HOME | End: 2023-04-04
Attending: SURGERY
Payer: MEDICARE

## 2023-04-04 DIAGNOSIS — M79.622 LEFT AXILLARY PAIN: ICD-10-CM

## 2023-04-04 DIAGNOSIS — Z80.3 FAMILY HISTORY OF BREAST CANCER: ICD-10-CM

## 2023-04-04 DIAGNOSIS — N64.4 MASTODYNIA OF LEFT BREAST: ICD-10-CM

## 2023-04-04 PROCEDURE — 77065 DX MAMMO INCL CAD UNI: CPT | Performed by: SURGERY

## 2023-04-04 PROCEDURE — 77061 BREAST TOMOSYNTHESIS UNI: CPT | Performed by: SURGERY

## 2023-04-04 PROCEDURE — 76642 ULTRASOUND BREAST LIMITED: CPT | Performed by: SURGERY

## 2023-04-15 ENCOUNTER — LAB ENCOUNTER (OUTPATIENT)
Dept: LAB | Age: 68
End: 2023-04-15
Attending: STUDENT IN AN ORGANIZED HEALTH CARE EDUCATION/TRAINING PROGRAM
Payer: MEDICARE

## 2023-04-15 DIAGNOSIS — Z01.818 PRE-OP TESTING: ICD-10-CM

## 2023-04-16 LAB — SARS-COV-2 RNA RESP QL NAA+PROBE: NOT DETECTED

## 2023-04-18 PROBLEM — R10.9 ABDOMINAL PAIN: Status: ACTIVE | Noted: 2023-04-18

## 2023-04-19 PROCEDURE — 88342 IMHCHEM/IMCYTCHM 1ST ANTB: CPT | Performed by: STUDENT IN AN ORGANIZED HEALTH CARE EDUCATION/TRAINING PROGRAM

## 2023-04-19 PROCEDURE — 88305 TISSUE EXAM BY PATHOLOGIST: CPT | Performed by: STUDENT IN AN ORGANIZED HEALTH CARE EDUCATION/TRAINING PROGRAM

## 2023-05-05 ENCOUNTER — HOSPITAL ENCOUNTER (OUTPATIENT)
Dept: CT IMAGING | Facility: HOSPITAL | Age: 68
Discharge: HOME | End: 2023-05-05
Attending: STUDENT IN AN ORGANIZED HEALTH CARE EDUCATION/TRAINING PROGRAM
Payer: MEDICARE

## 2023-05-05 DIAGNOSIS — K52.81 EOSINOPHILIC ENTERITIS: ICD-10-CM

## 2023-05-05 LAB
CREAT BLD-MCNC: 0.6 MG/DL
GFR SERPLBLD BASED ON 1.73 SQ M-ARVRAT: 98 ML/MIN/1.73M2 (ref 60–?)

## 2023-05-05 PROCEDURE — 74177 CT ABD & PELVIS W/CONTRAST: CPT | Performed by: STUDENT IN AN ORGANIZED HEALTH CARE EDUCATION/TRAINING PROGRAM

## 2023-05-05 PROCEDURE — 82565 ASSAY OF CREATININE: CPT

## 2023-05-30 ENCOUNTER — OFFICE VISIT (OUTPATIENT)
Dept: INTERNAL MEDICINE CLINIC | Facility: CLINIC | Age: 68
End: 2023-05-30
Payer: MEDICARE

## 2023-05-30 ENCOUNTER — LAB ENCOUNTER (OUTPATIENT)
Dept: LAB | Age: 68
End: 2023-05-30
Attending: INTERNAL MEDICINE
Payer: MEDICARE

## 2023-05-30 VITALS
HEART RATE: 91 BPM | OXYGEN SATURATION: 98 % | DIASTOLIC BLOOD PRESSURE: 64 MMHG | SYSTOLIC BLOOD PRESSURE: 120 MMHG | BODY MASS INDEX: 34 KG/M2 | TEMPERATURE: 98 F | WEIGHT: 200.63 LBS

## 2023-05-30 DIAGNOSIS — M79.642 PAIN IN BOTH HANDS: Primary | ICD-10-CM

## 2023-05-30 DIAGNOSIS — R79.89 ABNORMAL TSH: ICD-10-CM

## 2023-05-30 DIAGNOSIS — M79.642 PAIN IN BOTH HANDS: ICD-10-CM

## 2023-05-30 DIAGNOSIS — K52.81 EOSINOPHILIC ENTERITIS: ICD-10-CM

## 2023-05-30 DIAGNOSIS — I10 BENIGN ESSENTIAL HTN: ICD-10-CM

## 2023-05-30 DIAGNOSIS — E55.9 VITAMIN D DEFICIENCY: ICD-10-CM

## 2023-05-30 DIAGNOSIS — E03.9 HYPOTHYROIDISM, UNSPECIFIED TYPE: ICD-10-CM

## 2023-05-30 DIAGNOSIS — K29.80 DUODENITIS: ICD-10-CM

## 2023-05-30 DIAGNOSIS — M79.641 PAIN IN BOTH HANDS: Primary | ICD-10-CM

## 2023-05-30 DIAGNOSIS — M79.641 PAIN IN BOTH HANDS: ICD-10-CM

## 2023-05-30 LAB
ALBUMIN SERPL-MCNC: 3.7 G/DL (ref 3.4–5)
ALBUMIN/GLOB SERPL: 1 {RATIO} (ref 1–2)
ALP LIVER SERPL-CCNC: 65 U/L
ALT SERPL-CCNC: 35 U/L
ANION GAP SERPL CALC-SCNC: 5 MMOL/L (ref 0–18)
AST SERPL-CCNC: 21 U/L (ref 15–37)
BASOPHILS # BLD AUTO: 0.05 X10(3) UL (ref 0–0.2)
BASOPHILS NFR BLD AUTO: 0.6 %
BILIRUB SERPL-MCNC: 0.4 MG/DL (ref 0.1–2)
BUN BLD-MCNC: 18 MG/DL (ref 7–18)
CALCIUM BLD-MCNC: 9.1 MG/DL (ref 8.5–10.1)
CHLORIDE SERPL-SCNC: 108 MMOL/L (ref 98–112)
CO2 SERPL-SCNC: 24 MMOL/L (ref 21–32)
CREAT BLD-MCNC: 0.64 MG/DL
EOSINOPHIL # BLD AUTO: 0.41 X10(3) UL (ref 0–0.7)
EOSINOPHIL NFR BLD AUTO: 5 %
ERYTHROCYTE [DISTWIDTH] IN BLOOD BY AUTOMATED COUNT: 13.2 %
FASTING STATUS PATIENT QL REPORTED: NO
FOLATE SERPL-MCNC: 24 NG/ML (ref 8.7–?)
GFR SERPLBLD BASED ON 1.73 SQ M-ARVRAT: 96 ML/MIN/1.73M2 (ref 60–?)
GLOBULIN PLAS-MCNC: 3.7 G/DL (ref 2.8–4.4)
GLUCOSE BLD-MCNC: 89 MG/DL (ref 70–99)
HCT VFR BLD AUTO: 40.7 %
HGB BLD-MCNC: 13.3 G/DL
IMM GRANULOCYTES # BLD AUTO: 0.05 X10(3) UL (ref 0–1)
IMM GRANULOCYTES NFR BLD: 0.6 %
LYMPHOCYTES # BLD AUTO: 2.52 X10(3) UL (ref 1–4)
LYMPHOCYTES NFR BLD AUTO: 30.4 %
MCH RBC QN AUTO: 27.9 PG (ref 26–34)
MCHC RBC AUTO-ENTMCNC: 32.7 G/DL (ref 31–37)
MCV RBC AUTO: 85.5 FL
MONOCYTES # BLD AUTO: 0.66 X10(3) UL (ref 0.1–1)
MONOCYTES NFR BLD AUTO: 8 %
NEUTROPHILS # BLD AUTO: 4.59 X10 (3) UL (ref 1.5–7.7)
NEUTROPHILS # BLD AUTO: 4.59 X10(3) UL (ref 1.5–7.7)
NEUTROPHILS NFR BLD AUTO: 55.4 %
OSMOLALITY SERPL CALC.SUM OF ELEC: 285 MOSM/KG (ref 275–295)
PLATELET # BLD AUTO: 328 10(3)UL (ref 150–450)
POTASSIUM SERPL-SCNC: 3.9 MMOL/L (ref 3.5–5.1)
PROT SERPL-MCNC: 7.4 G/DL (ref 6.4–8.2)
RBC # BLD AUTO: 4.76 X10(6)UL
SODIUM SERPL-SCNC: 137 MMOL/L (ref 136–145)
T4 FREE SERPL-MCNC: 1.3 NG/DL (ref 0.8–1.7)
TSI SER-ACNC: 0.15 MIU/ML (ref 0.36–3.74)
VIT B12 SERPL-MCNC: 355 PG/ML (ref 193–986)
VIT D+METAB SERPL-MCNC: 20 NG/ML (ref 30–100)
WBC # BLD AUTO: 8.3 X10(3) UL (ref 4–11)

## 2023-05-30 PROCEDURE — 99214 OFFICE O/P EST MOD 30 MIN: CPT | Performed by: INTERNAL MEDICINE

## 2023-05-30 PROCEDURE — 36415 COLL VENOUS BLD VENIPUNCTURE: CPT

## 2023-05-30 PROCEDURE — 1159F MED LIST DOCD IN RCRD: CPT | Performed by: INTERNAL MEDICINE

## 2023-05-30 PROCEDURE — 84443 ASSAY THYROID STIM HORMONE: CPT

## 2023-05-30 PROCEDURE — 85025 COMPLETE CBC W/AUTO DIFF WBC: CPT

## 2023-05-30 PROCEDURE — 84439 ASSAY OF FREE THYROXINE: CPT

## 2023-05-30 PROCEDURE — 80053 COMPREHEN METABOLIC PANEL: CPT

## 2023-05-30 PROCEDURE — 82306 VITAMIN D 25 HYDROXY: CPT

## 2023-05-30 PROCEDURE — 82607 VITAMIN B-12: CPT

## 2023-05-30 PROCEDURE — 82746 ASSAY OF FOLIC ACID SERUM: CPT

## 2023-05-30 PROCEDURE — 3074F SYST BP LT 130 MM HG: CPT | Performed by: INTERNAL MEDICINE

## 2023-05-30 PROCEDURE — 3078F DIAST BP <80 MM HG: CPT | Performed by: INTERNAL MEDICINE

## 2023-05-30 PROCEDURE — 1160F RVW MEDS BY RX/DR IN RCRD: CPT | Performed by: INTERNAL MEDICINE

## 2023-05-30 RX ORDER — LOSARTAN POTASSIUM 100 MG/1
100 TABLET ORAL DAILY
Qty: 90 TABLET | Refills: 3 | Status: SHIPPED | OUTPATIENT
Start: 2023-05-30

## 2023-05-31 ENCOUNTER — PATIENT MESSAGE (OUTPATIENT)
Dept: INTERNAL MEDICINE CLINIC | Facility: CLINIC | Age: 68
End: 2023-05-31

## 2023-05-31 NOTE — TELEPHONE ENCOUNTER
From: Som Snyder  To:  Jimmy Hartmann MD  Sent: 5/31/2023 4:52 PM CDT  Subject: Question regarding VITAMIN D, 25-HYDROXY    Me [facility]

## 2023-05-31 NOTE — TELEPHONE ENCOUNTER
From: Mukesh Valladares  To:  Vaughn Almaraz MD  Sent: 5/31/2023 1:18 AM CDT  Subject: Question regarding COMP METABOLIC PANEL (14)    please if you can call I don't understand the results Thank you

## 2023-05-31 NOTE — TELEPHONE ENCOUNTER
Spoke with pt-requesting vit D Rx. Advised of DV note to continue OTC supplement, add vit D to diet and see endo. Voiced understanding.

## 2023-06-01 ENCOUNTER — HOSPITAL ENCOUNTER (OUTPATIENT)
Dept: GENERAL RADIOLOGY | Age: 68
Discharge: HOME | End: 2023-06-01
Attending: INTERNAL MEDICINE
Payer: MEDICARE

## 2023-06-01 DIAGNOSIS — M79.642 PAIN IN BOTH HANDS: ICD-10-CM

## 2023-06-01 DIAGNOSIS — M79.641 PAIN IN BOTH HANDS: ICD-10-CM

## 2023-06-01 PROCEDURE — 73130 X-RAY EXAM OF HAND: CPT | Performed by: INTERNAL MEDICINE

## 2023-06-05 RX ORDER — PANTOPRAZOLE SODIUM 20 MG/1
TABLET, DELAYED RELEASE ORAL
Qty: 90 TABLET | Refills: 0 | OUTPATIENT
Start: 2023-06-05

## 2023-08-25 ENCOUNTER — HOSPITAL ENCOUNTER (OUTPATIENT)
Dept: BONE DENSITY | Age: 68
Discharge: HOME | End: 2023-08-25
Attending: INTERNAL MEDICINE
Payer: MEDICARE

## 2023-08-25 DIAGNOSIS — M85.89 OSTEOPENIA OF MULTIPLE SITES: Chronic | ICD-10-CM

## 2023-08-25 PROCEDURE — 77080 DXA BONE DENSITY AXIAL: CPT | Performed by: INTERNAL MEDICINE

## 2023-09-05 ENCOUNTER — TELEPHONE (OUTPATIENT)
Dept: INTERNAL MEDICINE CLINIC | Facility: CLINIC | Age: 68
End: 2023-09-05

## 2023-09-05 DIAGNOSIS — R39.9 UTI SYMPTOMS: Primary | ICD-10-CM

## 2023-09-05 NOTE — TELEPHONE ENCOUNTER
Patient called in stating that her lower back has been hurting her for about 3 weeks and states that she may have a UTI. No sooner appointments with any provider at. Is requesting a cb for recommendations. Please advise.     Future Appointments   Date Time Provider Oc Beth   9/14/2023  1:20 PM Annmarie Garcia MD [facility] [place]

## 2023-09-06 ENCOUNTER — LAB ENCOUNTER (OUTPATIENT)
Dept: LAB | Age: 68
End: 2023-09-06
Attending: INTERNAL MEDICINE
Payer: MEDICARE

## 2023-09-06 DIAGNOSIS — R39.9 UTI SYMPTOMS: ICD-10-CM

## 2023-09-06 LAB
BILIRUB UR QL STRIP.AUTO: NEGATIVE
CLARITY UR REFRACT.AUTO: CLEAR
GLUCOSE UR STRIP.AUTO-MCNC: NORMAL MG/DL
KETONES UR STRIP.AUTO-MCNC: NEGATIVE MG/DL
LEUKOCYTE ESTERASE UR QL STRIP.AUTO: 250
NITRITE UR QL STRIP.AUTO: NEGATIVE
PH UR STRIP.AUTO: 6 [PH] (ref 5–8)
PROT UR STRIP.AUTO-MCNC: NEGATIVE MG/DL
SP GR UR STRIP.AUTO: 1.01 (ref 1–1.03)
UROBILINOGEN UR STRIP.AUTO-MCNC: NORMAL MG/DL

## 2023-09-06 PROCEDURE — 81001 URINALYSIS AUTO W/SCOPE: CPT

## 2023-09-06 PROCEDURE — 87086 URINE CULTURE/COLONY COUNT: CPT

## 2023-09-06 NOTE — TELEPHONE ENCOUNTER
Spoke to patient she was informed culture still pending could take 2-3 days before results. She was advised to seek further evaluation for any new or worsening symptoms such as fever, blood in urine. Patient v/u and wants a call when results are in     She still c/o back and pelvic pain. Looking at results from u/a any concerns? Possible UTI?

## 2023-09-06 NOTE — TELEPHONE ENCOUNTER
Patient called asking why she is still in pain with back and pelvis if urine test came back normal. She would like a nurse to call and explain this.

## 2023-09-07 NOTE — TELEPHONE ENCOUNTER
Noted will await results. Patient aware it can be a few days. As of right now culture still in process.

## 2023-09-07 NOTE — PROGRESS NOTES
Dear RN, please let the patient know   So far the urine culture is negative. It is not clear what is causing her pelvic pain, back pain. I do see she has an appointment with Dr. Aly Dutton coming up.  She should keep the appointment and if symptoms worsen then Konrad Harris DO

## 2023-09-08 ENCOUNTER — TELEPHONE (OUTPATIENT)
Dept: INTERNAL MEDICINE CLINIC | Facility: CLINIC | Age: 68
End: 2023-09-08

## 2023-09-08 NOTE — TELEPHONE ENCOUNTER
FYI    Pt called back due to her pelvic/back pain, wanting to know what else she can do to relieve pain. I informed pt of Dr. Kristyn Kennedy message from her Urinalysis/Urine culture done on 9/7/23. Pt v/u and will go to IC if s/s worsen.

## 2023-09-11 ENCOUNTER — TELEPHONE (OUTPATIENT)
Dept: INTERNAL MEDICINE CLINIC | Facility: CLINIC | Age: 68
End: 2023-09-11

## 2023-09-11 DIAGNOSIS — E89.0 POSTOPERATIVE HYPOTHYROIDISM: Primary | ICD-10-CM

## 2023-09-11 NOTE — TELEPHONE ENCOUNTER
Referral needed.     Dr. Mi Navarrete  Endocrinology  [address] [facility], [address]    LK:[phone]  Fax: [phone]

## 2023-09-11 NOTE — TELEPHONE ENCOUNTER
Referral placed.      Last referral 2023 -  2023    LOV:   2023 Dr Saida Diego RTC 6 months  FOV scheduled 2023 - Dr Ritika Castelan

## 2023-09-14 ENCOUNTER — OFFICE VISIT (OUTPATIENT)
Dept: INTERNAL MEDICINE CLINIC | Facility: CLINIC | Age: 68
End: 2023-09-14
Payer: MEDICARE

## 2023-09-14 VITALS
HEART RATE: 60 BPM | RESPIRATION RATE: 15 BRPM | SYSTOLIC BLOOD PRESSURE: 104 MMHG | BODY MASS INDEX: 35 KG/M2 | WEIGHT: 204 LBS | OXYGEN SATURATION: 98 % | DIASTOLIC BLOOD PRESSURE: 60 MMHG | TEMPERATURE: 98 F

## 2023-09-14 DIAGNOSIS — R31.9 HEMATURIA, UNSPECIFIED TYPE: ICD-10-CM

## 2023-09-14 DIAGNOSIS — Z87.442 HISTORY OF RENAL STONE: ICD-10-CM

## 2023-09-14 DIAGNOSIS — M54.30 SCIATICA, UNSPECIFIED LATERALITY: ICD-10-CM

## 2023-09-14 DIAGNOSIS — G56.03 BILATERAL CARPAL TUNNEL SYNDROME: Primary | ICD-10-CM

## 2023-09-14 DIAGNOSIS — R30.0 DYSURIA: ICD-10-CM

## 2023-09-14 DIAGNOSIS — K21.9 GASTROESOPHAGEAL REFLUX DISEASE, UNSPECIFIED WHETHER ESOPHAGITIS PRESENT: ICD-10-CM

## 2023-09-14 DIAGNOSIS — R10.9 FLANK PAIN: ICD-10-CM

## 2023-09-14 DIAGNOSIS — F41.1 GAD (GENERALIZED ANXIETY DISORDER): ICD-10-CM

## 2023-09-14 PROBLEM — E66.01 SEVERE OBESITY (BMI 35.0-39.9) WITH COMORBIDITY (HCC): Chronic | Status: ACTIVE | Noted: 2023-09-14

## 2023-09-14 LAB
BILIRUBIN: NEGATIVE
GLUCOSE (URINE DIPSTICK): NEGATIVE MG/DL
KETONES (URINE DIPSTICK): NEGATIVE MG/DL
MULTISTIX LOT#: ABNORMAL NUMERIC
NITRITE, URINE: NEGATIVE
PH, URINE: 5.5 (ref 4.5–8)
PROTEIN (URINE DIPSTICK): NEGATIVE MG/DL
SPECIFIC GRAVITY: 1.02 (ref 1–1.03)
URINE-COLOR: YELLOW
UROBILINOGEN,SEMI-QN: 0.2 MG/DL (ref 0–1.9)

## 2023-09-14 PROCEDURE — 3074F SYST BP LT 130 MM HG: CPT | Performed by: INTERNAL MEDICINE

## 2023-09-14 PROCEDURE — 87086 URINE CULTURE/COLONY COUNT: CPT | Performed by: INTERNAL MEDICINE

## 2023-09-14 PROCEDURE — 1159F MED LIST DOCD IN RCRD: CPT | Performed by: INTERNAL MEDICINE

## 2023-09-14 PROCEDURE — 99214 OFFICE O/P EST MOD 30 MIN: CPT | Performed by: INTERNAL MEDICINE

## 2023-09-14 PROCEDURE — 3078F DIAST BP <80 MM HG: CPT | Performed by: INTERNAL MEDICINE

## 2023-09-14 PROCEDURE — 81003 URINALYSIS AUTO W/O SCOPE: CPT | Performed by: INTERNAL MEDICINE

## 2023-09-14 PROCEDURE — 1160F RVW MEDS BY RX/DR IN RCRD: CPT | Performed by: INTERNAL MEDICINE

## 2023-09-14 RX ORDER — PANTOPRAZOLE SODIUM 40 MG/1
40 TABLET, DELAYED RELEASE ORAL
Qty: 180 TABLET | Refills: 3 | Status: CANCELLED | OUTPATIENT
Start: 2023-09-14

## 2023-09-14 RX ORDER — ALPRAZOLAM 0.25 MG/1
0.25 TABLET ORAL 2 TIMES DAILY PRN
Qty: 30 TABLET | Refills: 0 | Status: SHIPPED | OUTPATIENT
Start: 2023-09-14

## 2023-09-14 RX ORDER — CIPROFLOXACIN 500 MG/1
500 TABLET, FILM COATED ORAL 2 TIMES DAILY
Qty: 14 TABLET | Refills: 0 | Status: SHIPPED | OUTPATIENT
Start: 2023-09-14 | End: 2023-09-21

## 2023-09-14 RX ORDER — METHYLPREDNISOLONE 4 MG/1
TABLET ORAL
Qty: 1 EACH | Refills: 0 | Status: SHIPPED | OUTPATIENT
Start: 2023-09-14

## 2023-09-14 RX ORDER — PANTOPRAZOLE SODIUM 40 MG/1
40 TABLET, DELAYED RELEASE ORAL
Qty: 90 TABLET | Refills: 0 | Status: SHIPPED | OUTPATIENT
Start: 2023-09-14

## 2023-09-15 ENCOUNTER — TELEPHONE (OUTPATIENT)
Dept: ORTHOPEDICS CLINIC | Facility: CLINIC | Age: 68
End: 2023-09-15

## 2023-09-21 ENCOUNTER — OFFICE VISIT (OUTPATIENT)
Dept: ORTHOPEDICS CLINIC | Facility: CLINIC | Age: 68
End: 2023-09-21
Payer: MEDICARE

## 2023-09-21 VITALS — BODY MASS INDEX: 33.32 KG/M2 | HEIGHT: 65 IN | WEIGHT: 200 LBS

## 2023-09-21 DIAGNOSIS — M18.0 PRIMARY OSTEOARTHRITIS OF BOTH FIRST CARPOMETACARPAL JOINTS: Primary | ICD-10-CM

## 2023-09-21 PROCEDURE — 1159F MED LIST DOCD IN RCRD: CPT | Performed by: PHYSICIAN ASSISTANT

## 2023-09-21 PROCEDURE — 99213 OFFICE O/P EST LOW 20 MIN: CPT | Performed by: PHYSICIAN ASSISTANT

## 2023-09-21 PROCEDURE — 1126F AMNT PAIN NOTED NONE PRSNT: CPT | Performed by: PHYSICIAN ASSISTANT

## 2023-09-21 PROCEDURE — 1160F RVW MEDS BY RX/DR IN RCRD: CPT | Performed by: PHYSICIAN ASSISTANT

## 2023-09-21 PROCEDURE — 3008F BODY MASS INDEX DOCD: CPT | Performed by: PHYSICIAN ASSISTANT

## 2023-09-22 ENCOUNTER — HOSPITAL ENCOUNTER (OUTPATIENT)
Dept: CT IMAGING | Age: 68
Discharge: HOME | End: 2023-09-22
Attending: INTERNAL MEDICINE
Payer: MEDICARE

## 2023-09-22 DIAGNOSIS — R10.9 FLANK PAIN: ICD-10-CM

## 2023-09-22 DIAGNOSIS — Z87.442 HISTORY OF RENAL STONE: ICD-10-CM

## 2023-09-22 DIAGNOSIS — R31.9 HEMATURIA, UNSPECIFIED TYPE: ICD-10-CM

## 2023-09-22 PROCEDURE — 74176 CT ABD & PELVIS W/O CONTRAST: CPT | Performed by: INTERNAL MEDICINE

## 2023-09-28 NOTE — TELEPHONE ENCOUNTER
Please advise if patient should be seen sooner or referred to podiatry? 5-Fu Counseling: 5-Fluorouracil Counseling:  I discussed with the patient the risks of 5-fluorouracil including but not limited to erythema, scaling, itching, weeping, crusting, and pain.

## 2023-10-02 ENCOUNTER — OFFICE VISIT (OUTPATIENT)
Dept: FAMILY MEDICINE CLINIC | Facility: CLINIC | Age: 68
End: 2023-10-02
Payer: MEDICARE

## 2023-10-02 VITALS
HEART RATE: 98 BPM | OXYGEN SATURATION: 98 % | DIASTOLIC BLOOD PRESSURE: 80 MMHG | TEMPERATURE: 99 F | HEIGHT: 65 IN | BODY MASS INDEX: 34.16 KG/M2 | WEIGHT: 205 LBS | SYSTOLIC BLOOD PRESSURE: 140 MMHG

## 2023-10-02 DIAGNOSIS — J06.9 UPPER RESPIRATORY TRACT INFECTION, UNSPECIFIED TYPE: Primary | ICD-10-CM

## 2023-10-02 DIAGNOSIS — H65.193 ACUTE MEE (MIDDLE EAR EFFUSION), BILATERAL: ICD-10-CM

## 2023-10-02 DIAGNOSIS — E66.01 SEVERE OBESITY (BMI 35.0-39.9) WITH COMORBIDITY (HCC): ICD-10-CM

## 2023-10-02 LAB
CONTROL LINE PRESENT WITH A CLEAR BACKGROUND (YES/NO): YES YES/NO
KIT LOT #: NORMAL NUMERIC
OPERATOR ID: NORMAL
POCT LOT NUMBER: NORMAL
RAPID SARS-COV-2 BY PCR: NOT DETECTED

## 2023-10-02 PROCEDURE — 99213 OFFICE O/P EST LOW 20 MIN: CPT | Performed by: PHYSICIAN ASSISTANT

## 2023-10-02 PROCEDURE — U0002 COVID-19 LAB TEST NON-CDC: HCPCS | Performed by: PHYSICIAN ASSISTANT

## 2023-10-02 PROCEDURE — 87880 STREP A ASSAY W/OPTIC: CPT | Performed by: PHYSICIAN ASSISTANT

## 2023-10-02 RX ORDER — ALBUTEROL SULFATE 90 UG/1
AEROSOL, METERED RESPIRATORY (INHALATION)
Qty: 1 EACH | Refills: 0 | Status: SHIPPED | OUTPATIENT
Start: 2023-10-02

## 2023-10-02 RX ORDER — FLUTICASONE PROPIONATE 50 MCG
2 SPRAY, SUSPENSION (ML) NASAL DAILY
Qty: 1 EACH | Refills: 0 | Status: SHIPPED | OUTPATIENT
Start: 2023-10-02 | End: 2023-10-16

## 2023-10-27 RX ORDER — FLUTICASONE PROPIONATE 50 MCG
2 SPRAY, SUSPENSION (ML) NASAL DAILY
Qty: 16 G | Refills: 0 | OUTPATIENT
Start: 2023-10-27

## 2023-11-22 ENCOUNTER — HOSPITAL ENCOUNTER (OUTPATIENT)
Dept: MAMMOGRAPHY | Age: 68
Discharge: HOME | End: 2023-11-22
Attending: INTERNAL MEDICINE
Payer: MEDICARE

## 2023-11-22 DIAGNOSIS — Z12.31 SCREENING MAMMOGRAM FOR BREAST CANCER: ICD-10-CM

## 2023-11-22 PROCEDURE — 77063 BREAST TOMOSYNTHESIS BI: CPT | Performed by: INTERNAL MEDICINE

## 2023-11-22 PROCEDURE — 77067 SCR MAMMO BI INCL CAD: CPT | Performed by: INTERNAL MEDICINE

## 2023-12-18 ENCOUNTER — OFFICE VISIT (OUTPATIENT)
Dept: INTERNAL MEDICINE CLINIC | Facility: CLINIC | Age: 68
End: 2023-12-18
Payer: MEDICARE

## 2023-12-18 ENCOUNTER — LAB ENCOUNTER (OUTPATIENT)
Dept: LAB | Age: 68
End: 2023-12-18
Attending: INTERNAL MEDICINE
Payer: MEDICARE

## 2023-12-18 VITALS
WEIGHT: 205.63 LBS | TEMPERATURE: 97 F | HEART RATE: 79 BPM | OXYGEN SATURATION: 98 % | DIASTOLIC BLOOD PRESSURE: 80 MMHG | BODY MASS INDEX: 34 KG/M2 | SYSTOLIC BLOOD PRESSURE: 146 MMHG | RESPIRATION RATE: 16 BRPM

## 2023-12-18 DIAGNOSIS — M25.551 PAIN OF RIGHT HIP: Primary | ICD-10-CM

## 2023-12-18 DIAGNOSIS — R79.89 ABNORMAL TSH: ICD-10-CM

## 2023-12-18 DIAGNOSIS — R79.89 LOW VITAMIN D LEVEL: ICD-10-CM

## 2023-12-18 DIAGNOSIS — M79.604 PAIN OF RIGHT LOWER EXTREMITY: ICD-10-CM

## 2023-12-18 DIAGNOSIS — M25.561 RIGHT KNEE PAIN, UNSPECIFIED CHRONICITY: ICD-10-CM

## 2023-12-18 DIAGNOSIS — E05.90 SUBCLINICAL HYPERTHYROIDISM: ICD-10-CM

## 2023-12-18 LAB
T3FREE SERPL-MCNC: 2.55 PG/ML (ref 2.4–4.2)
T4 FREE SERPL-MCNC: 1.2 NG/DL (ref 0.8–1.7)
TSI SER-ACNC: 0.28 MIU/ML (ref 0.36–3.74)
VIT D+METAB SERPL-MCNC: 18.6 NG/ML (ref 30–100)

## 2023-12-18 PROCEDURE — 82306 VITAMIN D 25 HYDROXY: CPT

## 2023-12-18 PROCEDURE — 99214 OFFICE O/P EST MOD 30 MIN: CPT | Performed by: INTERNAL MEDICINE

## 2023-12-18 PROCEDURE — 3077F SYST BP >= 140 MM HG: CPT | Performed by: INTERNAL MEDICINE

## 2023-12-18 PROCEDURE — 84439 ASSAY OF FREE THYROXINE: CPT

## 2023-12-18 PROCEDURE — 84481 FREE ASSAY (FT-3): CPT

## 2023-12-18 PROCEDURE — 84443 ASSAY THYROID STIM HORMONE: CPT

## 2023-12-18 PROCEDURE — 1159F MED LIST DOCD IN RCRD: CPT | Performed by: INTERNAL MEDICINE

## 2023-12-18 PROCEDURE — 1160F RVW MEDS BY RX/DR IN RCRD: CPT | Performed by: INTERNAL MEDICINE

## 2023-12-18 PROCEDURE — 3079F DIAST BP 80-89 MM HG: CPT | Performed by: INTERNAL MEDICINE

## 2023-12-18 PROCEDURE — 36415 COLL VENOUS BLD VENIPUNCTURE: CPT

## 2023-12-18 RX ORDER — MELOXICAM 7.5 MG/1
7.5 TABLET ORAL DAILY PRN
Qty: 30 TABLET | Refills: 0 | Status: SHIPPED | OUTPATIENT
Start: 2023-12-18

## 2023-12-20 ENCOUNTER — HOSPITAL ENCOUNTER (OUTPATIENT)
Dept: GENERAL RADIOLOGY | Age: 68
Discharge: HOME | End: 2023-12-20
Attending: SURGERY
Payer: MEDICARE

## 2023-12-20 ENCOUNTER — HOSPITAL ENCOUNTER (OUTPATIENT)
Dept: GENERAL RADIOLOGY | Age: 68
Discharge: HOME | End: 2023-12-20
Attending: INTERNAL MEDICINE
Payer: MEDICARE

## 2023-12-20 DIAGNOSIS — M25.551 PAIN OF RIGHT HIP: ICD-10-CM

## 2023-12-20 DIAGNOSIS — M79.604 PAIN OF RIGHT LOWER EXTREMITY: ICD-10-CM

## 2023-12-20 DIAGNOSIS — M25.561 RIGHT KNEE PAIN, UNSPECIFIED CHRONICITY: ICD-10-CM

## 2023-12-20 DIAGNOSIS — Z01.89 ENCOUNTER FOR LOWER EXTREMITY COMPARISON IMAGING STUDY: ICD-10-CM

## 2023-12-20 PROCEDURE — 73562 X-RAY EXAM OF KNEE 3: CPT | Performed by: INTERNAL MEDICINE

## 2023-12-20 PROCEDURE — 73502 X-RAY EXAM HIP UNI 2-3 VIEWS: CPT | Performed by: INTERNAL MEDICINE

## 2023-12-20 PROCEDURE — 73560 X-RAY EXAM OF KNEE 1 OR 2: CPT | Performed by: SURGERY

## 2023-12-20 PROCEDURE — 72114 X-RAY EXAM L-S SPINE BENDING: CPT | Performed by: INTERNAL MEDICINE

## 2023-12-27 ENCOUNTER — TELEPHONE (OUTPATIENT)
Dept: INTERNAL MEDICINE CLINIC | Facility: CLINIC | Age: 68
End: 2023-12-27

## 2023-12-27 NOTE — TELEPHONE ENCOUNTER
Relayed xr lumbar results and during call discussed lab results.     Patient states she is still taking Calcitriol. She has an endo doctor already and will f/u with them.     DV result note  Results reviewed. Please inform patient labs show that patient is vitamin D deficient. Please confirm she is taking calcitriol.  Plan to repeat labs in 3 months.  TSH not at goal; appears to have subclinical hyperthyroidism. Should see endo; referral placed.

## 2023-12-27 NOTE — TELEPHONE ENCOUNTER
----- Message from Isabela Adams MD sent at 12/20/2023  1:08 PM CST -----  Results reviewed. Please inform patient that hip xray without acute findings and knee xray stable. Lumbar xray with DJD. To try PT as discussed in clinic.

## 2024-01-11 ENCOUNTER — OFFICE VISIT (OUTPATIENT)
Dept: INTERNAL MEDICINE CLINIC | Facility: CLINIC | Age: 69
End: 2024-01-11
Payer: MEDICARE

## 2024-01-11 VITALS
HEIGHT: 65 IN | RESPIRATION RATE: 16 BRPM | TEMPERATURE: 98 F | DIASTOLIC BLOOD PRESSURE: 72 MMHG | OXYGEN SATURATION: 98 % | HEART RATE: 74 BPM | WEIGHT: 206.19 LBS | BODY MASS INDEX: 34.35 KG/M2 | SYSTOLIC BLOOD PRESSURE: 120 MMHG

## 2024-01-11 DIAGNOSIS — K21.9 GASTROESOPHAGEAL REFLUX DISEASE, UNSPECIFIED WHETHER ESOPHAGITIS PRESENT: ICD-10-CM

## 2024-01-11 DIAGNOSIS — J01.90 ACUTE RHINOSINUSITIS: Primary | ICD-10-CM

## 2024-01-11 DIAGNOSIS — F33.0 MILD EPISODE OF RECURRENT MAJOR DEPRESSIVE DISORDER (HCC): Chronic | ICD-10-CM

## 2024-01-11 DIAGNOSIS — D32.9 MENINGIOMA (HCC): Chronic | ICD-10-CM

## 2024-01-11 DIAGNOSIS — R51.9 RIGHT-SIDED HEADACHE: ICD-10-CM

## 2024-01-11 DIAGNOSIS — I10 PRIMARY HYPERTENSION: Chronic | ICD-10-CM

## 2024-01-11 DIAGNOSIS — E66.01 SEVERE OBESITY (BMI 35.0-39.9) WITH COMORBIDITY (HCC): Chronic | ICD-10-CM

## 2024-01-11 PROCEDURE — 1170F FXNL STATUS ASSESSED: CPT | Performed by: INTERNAL MEDICINE

## 2024-01-11 PROCEDURE — 1160F RVW MEDS BY RX/DR IN RCRD: CPT | Performed by: INTERNAL MEDICINE

## 2024-01-11 PROCEDURE — 3008F BODY MASS INDEX DOCD: CPT | Performed by: INTERNAL MEDICINE

## 2024-01-11 PROCEDURE — 1159F MED LIST DOCD IN RCRD: CPT | Performed by: INTERNAL MEDICINE

## 2024-01-11 PROCEDURE — 3074F SYST BP LT 130 MM HG: CPT | Performed by: INTERNAL MEDICINE

## 2024-01-11 PROCEDURE — 99214 OFFICE O/P EST MOD 30 MIN: CPT | Performed by: INTERNAL MEDICINE

## 2024-01-11 PROCEDURE — 3078F DIAST BP <80 MM HG: CPT | Performed by: INTERNAL MEDICINE

## 2024-01-11 RX ORDER — CALCITRIOL 0.5 UG/1
0.5 CAPSULE, LIQUID FILLED ORAL DAILY
Qty: 90 CAPSULE | Refills: 1 | Status: SHIPPED | OUTPATIENT
Start: 2024-01-11

## 2024-01-11 RX ORDER — AMOXICILLIN AND CLAVULANATE POTASSIUM 875; 125 MG/1; MG/1
1 TABLET, FILM COATED ORAL 2 TIMES DAILY
Qty: 14 TABLET | Refills: 0 | Status: SHIPPED | OUTPATIENT
Start: 2024-01-11 | End: 2024-01-18

## 2024-01-11 RX ORDER — PANTOPRAZOLE SODIUM 40 MG/1
40 TABLET, DELAYED RELEASE ORAL
Qty: 90 TABLET | Refills: 1 | Status: SHIPPED | OUTPATIENT
Start: 2024-01-11

## 2024-01-11 RX ORDER — FLUTICASONE PROPIONATE 50 MCG
2 SPRAY, SUSPENSION (ML) NASAL DAILY
Qty: 1 EACH | Refills: 0 | Status: SHIPPED | OUTPATIENT
Start: 2024-01-11

## 2024-01-11 NOTE — PROGRESS NOTES
Jacqui Martínez is a 68 year old female.   HPI:   Patient presents to discuss several issues.    She has been dealing with intermittent right-sided headaches for two weeks.  A little better this week.  She felt pulsatile pain with heartbeat on right side.  Intermittent pain throughout the day.  Constant dull aching, sometimes with intermittent sharp pain.  No nasal drip/drainage.  She has had a cough for 1 month.  No blurry vision/vision symptoms.  No nausea/emesis.  Does have meningioma but is has been stable on MRI imaging.    Other chronic issues:  Hypertension - at goal blood pressure.  GERD - on pantoprazole therapy, needs refill.  Obesity - Body mass index is 34.31 kg/m².  Depression - stable mood.    Past medical, family, surgical and social history were reviewed as listed in the chart, and are unchanged from previous visit on 12/18/2023  REVIEW OF SYSTEMS:   GENERAL/ const: no fevers/chills, no unintentional weight loss  EYES:no vision problems  HEENT: cough; denies sinus pain or sinus tenderness  LUNGS: denies shortness of breath   CARDIOVASCULAR: denies chest pain  GI: denies nausea/emesis/ abdominal pain diarrhea constipation  : denies dysuria   NEURO: right-sided headaches  PSYCHIATRIC: depression, stable.  ENDOCRINE: no hot/cold intolerance  ALLERGY: No Known Allergies  PAST HISTORY:     Current Outpatient Medications:     calcitRIOL 0.5 MCG Oral Cap, Take 1 capsule (0.5 mcg total) by mouth daily., Disp: 90 capsule, Rfl: 1    pantoprazole 40 MG Oral Tab EC, Take 1 tablet (40 mg total) by mouth every morning before breakfast., Disp: 90 tablet, Rfl: 1    fluticasone propionate 50 MCG/ACT Nasal Suspension, 2 sprays by Each Nare route daily., Disp: 1 each, Rfl: 0    amoxicillin clavulanate 875-125 MG Oral Tab, Take 1 tablet by mouth 2 (two) times daily for 7 days., Disp: 14 tablet, Rfl: 0    Meloxicam 7.5 MG Oral Tab, Take 1 tablet (7.5 mg total) by mouth daily as needed for Pain. Do NOT take other NSAIDs  with this medication., Disp: 30 tablet, Rfl: 0    ALPRAZolam 0.25 MG Oral Tab, Take 1 tablet (0.25 mg total) by mouth 2 (two) times daily as needed for Anxiety., Disp: 30 tablet, Rfl: 0    losartan 100 MG Oral Tab, Take 1 tablet (100 mg total) by mouth daily., Disp: 90 tablet, Rfl: 3    levothyroxine 150 MCG Oral Tab, Take 1 tablet (150 mcg total) by mouth daily., Disp: , Rfl:   Medical:  has a past medical history of Abdominal pain, Anemia, Anxiety, Arthritis, Benign paroxysmal positional vertigo due to bilateral vestibular disorder (02/20/2018), Bloating, Calculus of kidney, Cancer (HCC), Constipation, Diarrhea, unspecified, Essential hypertension, Feeling lonely, Glaucoma, Heartburn, History of depression, Hypothyroidism, Irregular bowel habits, Kidney stone, Loss of appetite, Normal delivery, Personal history of malignant neoplasm of thyroid (11/17/2011), PONV (postoperative nausea and vomiting), Right leg pain (08/19/2020), Stress, Thyroid disease, Visual impairment, Wears glasses, and Weight gain.  Surgical:  has a past surgical history that includes cystoscopy,+ureteroscopy (Left, 5/4/17); other surgical history (1/1/2005); other surgical history (05/2019); valeria localization wire 1 site left (cpt=19281) (AGE 25); total knee replacement (Bilateral); knee replacement surgery (Left, 4/18/16); knee replacement surgery (Right, 03/22/2019); and knee replacement surgery (Right, 2020).  Family: family history includes Breast Cancer in her sister and another family member; Cancer in her daughter; Heart Disease in her father and mother.  Social:  reports that she has never smoked. She has never used smokeless tobacco. She reports current alcohol use. She reports that she does not use drugs.  Wt Readings from Last 6 Encounters:   01/11/24 206 lb 3.2 oz (93.5 kg)   12/18/23 205 lb 9.6 oz (93.3 kg)   10/02/23 205 lb (93 kg)   09/21/23 200 lb (90.7 kg)   09/14/23 204 lb (92.5 kg)   05/30/23 200 lb 9.6 oz (91 kg)     EXAM:    /72 (BP Location: Right arm, Patient Position: Sitting, Cuff Size: large)   Pulse 74   Temp 98.1 °F (36.7 °C) (Temporal)   Resp 16   Ht 5' 5\" (1.651 m)   Wt 206 lb 3.2 oz (93.5 kg)   SpO2 98%   Breastfeeding No   BMI 34.31 kg/m²   GENERAL: Alert and oriented, well developed, well nourished,in no apparent distress  HEENT: atraumatic, PERRLA, EOMI, normal lid and conjunctiva; congestion behind right tympanic membrane; tenderness right maxillary and frontal sinuses  NECK: supple, no jvd, no thyromegaly; tender right-sided cervical lymphadenopathy.  LUNGS: clear to auscultation bilaterally, no wheezing/rubs  CARDIO: RRR without murmurs.  No clubbing, cyanosis or edema.  GI: soft non tender nondistended no hepatosplenomegaly, bowel sounds throughout  PSYCH: pleasant, appropriate mood and affect  ASSESSMENT AND PLAN:   1. Acute rhinosinusitis  2. Right-sided headache  3. Meningioma (HCC)  Patient with right-sided headaches for two weeks, getting better.  No associated neurological symptoms.  No visual symptoms.  On examination she has right-sided sinus tenderness, tender right-sided cervical lymphadenopathy, congestion behind right tympanic membrane.  Has meningioma but this has been stable on imaging.  Will start on fluticasone nasal spray, Augmentin.  Advised patient to contact us if symptoms persist - may have patient follow up with Neurology at that point.  - fluticasone propionate 50 MCG/ACT Nasal Suspension; 2 sprays by Each Nare route daily.  Dispense: 1 each; Refill: 0  - amoxicillin clavulanate 875-125 MG Oral Tab; Take 1 tablet by mouth 2 (two) times daily for 7 days.  Dispense: 14 tablet; Refill: 0    4. Primary hypertension  At goal blood pressure.  Continue losartan 100 mg every day.    5. Gastroesophageal reflux disease, unspecified whether esophagitis present  On pantoprazole; refilled as below.  - pantoprazole 40 MG Oral Tab EC; Take 1 tablet (40 mg total) by mouth every morning before  breakfast.  Dispense: 90 tablet; Refill: 1    6. Severe obesity (BMI 35.0-39.9) with comorbidity (HCC)  Body mass index is 34.31 kg/m².  Focus on lifestyle modification.    7. Mild episode of recurrent major depressive disorder (HCC)  Stable, on PRN alprazolam, more for anxiety.    Patient Care Team:  Traci Lang MD as PCP - General (Internal Medicine)  Geno Naqvi MD as Consulting Physician (ENDOCRINOLOGY)  Peter Serna as Consulting Physician (OPHTHALMOLOGY)  Raissa Isaacs MD as Consulting Physician (NEUROLOGY)  An Russell PT as Physical Therapist (Physical Therapy)  Jocelynn Chin APRN (Nurse Practitioner)  The patient indicates understanding of these issues and agrees to the plan.  The patient is asked to return to clinic in 3-6 months for follow up on chronic issues/MA Supervisit, or earlier if acute issues arise.    Traci Lang MD

## 2024-01-31 NOTE — TELEPHONE ENCOUNTER
[facility] calling regarding PA . Questions answered. Approval or denial on PA will come shortly. Shower only

## 2024-02-15 ENCOUNTER — PATIENT MESSAGE (OUTPATIENT)
Dept: INTERNAL MEDICINE CLINIC | Facility: CLINIC | Age: 69
End: 2024-02-15

## 2024-02-15 DIAGNOSIS — E05.90 SUBCLINICAL HYPERTHYROIDISM: Primary | ICD-10-CM

## 2024-02-15 DIAGNOSIS — I10 BENIGN ESSENTIAL HTN: ICD-10-CM

## 2024-02-15 RX ORDER — LOSARTAN POTASSIUM 100 MG/1
100 TABLET ORAL DAILY
Qty: 90 TABLET | Refills: 3 | OUTPATIENT
Start: 2024-02-15

## 2024-02-15 NOTE — TELEPHONE ENCOUNTER
From: Jacqui Martínez  To: Traci Barry  Sent: 2/15/2024 10:17 AM CST  Subject: Dr crystal barry    good morning please I need a referral for the thyroid doctor

## 2024-02-15 NOTE — TELEPHONE ENCOUNTER
From: Jacqui Martínez  To: Traci Lang  Sent: 2/15/2024 1:02 PM CST  Subject: Traci fajardo    Good afternoon, I need a referral for Dr. Fannie Rodriguez, the phone number is [phone], thank you.

## 2024-03-14 ENCOUNTER — TELEPHONE (OUTPATIENT)
Dept: INTERNAL MEDICINE CLINIC | Facility: CLINIC | Age: 69
End: 2024-03-14

## 2024-03-14 DIAGNOSIS — K21.9 GASTROESOPHAGEAL REFLUX DISEASE, UNSPECIFIED WHETHER ESOPHAGITIS PRESENT: Primary | ICD-10-CM

## 2024-03-14 NOTE — TELEPHONE ENCOUNTER
Patient called the office to request a referral to see Dr Webb in GI, patient states that she has an appointment in that office on 03/20. Please advise.

## 2024-04-04 ENCOUNTER — OFFICE VISIT (OUTPATIENT)
Dept: INTERNAL MEDICINE CLINIC | Facility: CLINIC | Age: 69
End: 2024-04-04
Payer: MEDICARE

## 2024-04-04 VITALS
HEIGHT: 64.5 IN | WEIGHT: 207.38 LBS | TEMPERATURE: 98 F | DIASTOLIC BLOOD PRESSURE: 60 MMHG | HEART RATE: 75 BPM | OXYGEN SATURATION: 96 % | RESPIRATION RATE: 15 BRPM | SYSTOLIC BLOOD PRESSURE: 120 MMHG | BODY MASS INDEX: 34.98 KG/M2

## 2024-04-04 DIAGNOSIS — M15.9 PRIMARY OSTEOARTHRITIS INVOLVING MULTIPLE JOINTS: Chronic | ICD-10-CM

## 2024-04-04 DIAGNOSIS — G57.12 MERALGIA PARESTHETICA OF LEFT SIDE: ICD-10-CM

## 2024-04-04 DIAGNOSIS — Z86.73 HISTORY OF ARTERIAL ISCHEMIC STROKE: ICD-10-CM

## 2024-04-04 DIAGNOSIS — N64.4 CHRONIC BREAST PAIN: ICD-10-CM

## 2024-04-04 DIAGNOSIS — L98.9 MULTIPLE LESIONS OF FACE: ICD-10-CM

## 2024-04-04 DIAGNOSIS — E66.01 SEVERE OBESITY (BMI 35.0-39.9) WITH COMORBIDITY (HCC): Chronic | ICD-10-CM

## 2024-04-04 DIAGNOSIS — K21.9 GASTROESOPHAGEAL REFLUX DISEASE, UNSPECIFIED WHETHER ESOPHAGITIS PRESENT: Chronic | ICD-10-CM

## 2024-04-04 DIAGNOSIS — M85.89 OSTEOPENIA OF MULTIPLE SITES: Chronic | ICD-10-CM

## 2024-04-04 DIAGNOSIS — F41.1 GAD (GENERALIZED ANXIETY DISORDER): Chronic | ICD-10-CM

## 2024-04-04 DIAGNOSIS — M25.50 POLYARTHRALGIA: ICD-10-CM

## 2024-04-04 DIAGNOSIS — E03.9 HYPOTHYROIDISM, UNSPECIFIED TYPE: Chronic | ICD-10-CM

## 2024-04-04 DIAGNOSIS — Z00.00 ENCOUNTER FOR ANNUAL WELLNESS VISIT (AWV) IN MEDICARE PATIENT: Primary | ICD-10-CM

## 2024-04-04 DIAGNOSIS — R06.09 DOE (DYSPNEA ON EXERTION): ICD-10-CM

## 2024-04-04 DIAGNOSIS — D32.9 MENINGIOMA (HCC): Chronic | ICD-10-CM

## 2024-04-04 DIAGNOSIS — M21.371 RIGHT FOOT DROP: ICD-10-CM

## 2024-04-04 DIAGNOSIS — Z96.651 HISTORY OF REVISION OF TOTAL REPLACEMENT OF RIGHT KNEE JOINT: ICD-10-CM

## 2024-04-04 DIAGNOSIS — G89.29 CHRONIC BREAST PAIN: ICD-10-CM

## 2024-04-04 DIAGNOSIS — E55.9 VITAMIN D DEFICIENCY: ICD-10-CM

## 2024-04-04 DIAGNOSIS — I10 PRIMARY HYPERTENSION: Chronic | ICD-10-CM

## 2024-04-04 DIAGNOSIS — M25.551 PAIN OF RIGHT HIP: ICD-10-CM

## 2024-04-04 DIAGNOSIS — H40.9 GLAUCOMA OF BOTH EYES, UNSPECIFIED GLAUCOMA TYPE: ICD-10-CM

## 2024-04-04 PROBLEM — K64.8 INTERNAL HEMORRHOIDS: Status: RESOLVED | Noted: 2021-08-19 | Resolved: 2024-04-04

## 2024-04-04 PROBLEM — R10.9 ABDOMINAL PAIN: Status: RESOLVED | Noted: 2023-04-18 | Resolved: 2024-04-04

## 2024-04-04 PROBLEM — K29.70 GASTRITIS WITHOUT BLEEDING, UNSPECIFIED CHRONICITY, UNSPECIFIED GASTRITIS TYPE: Status: RESOLVED | Noted: 2023-03-03 | Resolved: 2024-04-04

## 2024-04-04 PROBLEM — K57.30 DIVERTICULOSIS OF COLON: Status: RESOLVED | Noted: 2021-08-19 | Resolved: 2024-04-04

## 2024-04-04 PROBLEM — F33.0 MILD EPISODE OF RECURRENT MAJOR DEPRESSIVE DISORDER: Chronic | Status: RESOLVED | Noted: 2021-03-03 | Resolved: 2024-04-04

## 2024-04-04 PROBLEM — R18.8 OTHER ASCITES: Status: RESOLVED | Noted: 2023-03-02 | Resolved: 2024-04-04

## 2024-04-04 PROBLEM — F33.0 MILD EPISODE OF RECURRENT MAJOR DEPRESSIVE DISORDER (HCC): Chronic | Status: RESOLVED | Noted: 2021-03-03 | Resolved: 2024-04-04

## 2024-04-04 PROBLEM — Z86.010 PERSONAL HISTORY OF COLONIC POLYPS: Status: RESOLVED | Noted: 2021-08-19 | Resolved: 2024-04-04

## 2024-04-04 PROBLEM — I88.0 MESENTERIC ADENITIS: Status: RESOLVED | Noted: 2023-03-03 | Resolved: 2024-04-04

## 2024-04-04 PROBLEM — Z86.0100 PERSONAL HISTORY OF COLONIC POLYPS: Status: RESOLVED | Noted: 2021-08-19 | Resolved: 2024-04-04

## 2024-04-04 PROBLEM — F51.04 CHRONIC INSOMNIA: Status: RESOLVED | Noted: 2017-10-03 | Resolved: 2024-04-04

## 2024-04-04 RX ORDER — PANTOPRAZOLE SODIUM 40 MG/1
40 TABLET, DELAYED RELEASE ORAL
Qty: 90 TABLET | Refills: 3 | Status: SHIPPED | OUTPATIENT
Start: 2024-04-04

## 2024-04-04 NOTE — PROGRESS NOTES
Subjective:   Jacqui Martínez is a 68 year old female who presents for a MA (Medicare Advantage) Supervisit (Once per calendar year) and the following:     Hypothyroidism- Abnormal TSH- to repeat labs and see anaid (sees Dr. Geno Naqvi). Reports last seeing her in 3/2024. States she had recent dose increase in levothyroxine.   Has been on levothyroxine  Requested records    Vitamin d deficiency-supplements (not consistent with meds) and repeat lab     Hx of CVA/meralgia paresthetica of left side/R foot drop-still with symptoms. Stable. Declines PT.   Reports going to gym.     GERD-on ppi but still has symptoms. Has appt with GI.      OA/osteopenia s/p R knee replacement-   Repots joint pain.   Is not always consistent with medication.   Last DEXA 8/2023    Obesity- working on diet/exercise     JUANCHO- controlled.     HTN-controlled   Reports continued MADRID- had normal stress test in 1/2023. Reports no worsening since before.   Discussed seeing cardiology     Meningioma- to check MRI brain     Had colonoscopy 8/2021- recommendations to repeat in 10 years  Mammogram-UTD 11/2023; due 11/2024  Reports chronic breast pain on L for over 10 years. Could be from prior I&D that was very painful -localized to that area.   Has had mammogram and US.     History/Other:   Fall Risk Assessment:   She has been screened for Falls and is High Risk. Fall Prevention information provided to patient in After Visit Summary.    Do you feel unsteady when standing or walking?: Yes  Do you worry about falling?: Yes  Have you fallen in the past year?: Yes  How many times have you fallen?: 2  Were you injured?: Yes     Cognitive Assessment:   She had a completely normal cognitive assessment - see flowsheet entries       Functional Ability/Status:   Jacqui Martínez has some abnormal functions as listed below:  She has Vision problems based on screening of functional status. She has problems with Daily Activities based on screening of  functional status.       Depression Screening (PHQ-2/PHQ-9): PHQ-2 SCORE: 0  , done 4/4/2024   Last [place] Suicide Screening on 4/4/2024 was No Risk.         Advanced Directives:   She does NOT have a Living Will. [Do you have a living will?: No]  She does NOT have a Power of  for Health Care. 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    Current Medications:  Outpatient Medications Marked as Taking for the 4/4/24 encounter (Office Visit) with Isabela Allen MD   Medication Sig    pantoprazole 40 MG Oral Tab EC Take 1 tablet (40 mg total) by mouth every morning before breakfast.    calcitRIOL 0.5 MCG Oral Cap Take 1 capsule (0.5 mcg total) by mouth daily.    fluticasone propionate 50 MCG/ACT Nasal Suspension 2 sprays by Each Nare route daily.    Meloxicam 7.5 MG Oral Tab Take 1 tablet (7.5 mg total) by mouth daily as needed for Pain. Do NOT take other NSAIDs with this medication.    ALPRAZolam 0.25 MG Oral Tab Take 1 tablet (0.25 mg total) by mouth 2 (two) times daily as needed for Anxiety.    losartan 100 MG Oral Tab Take 1 tablet (100 mg total) by mouth daily.    levothyroxine 150 MCG Oral Tab Take 1 tablet (150 mcg total) by mouth daily.       Medical History:  She  has a past medical history of Abdominal pain, Anemia, Anxiety, Arthritis, Benign paroxysmal positional vertigo due to bilateral  vestibular disorder (02/20/2018), Bloating, Calculus of kidney, Cancer (HCC), Constipation, Diarrhea, unspecified, Essential hypertension, Feeling lonely, Glaucoma, Heartburn, History of depression, Hypothyroidism, Irregular bowel habits, Kidney stone, Loss of appetite, Mesenteric adenitis, Mild episode of recurrent major depressive disorder (HCC), Normal delivery (HCC), Personal history of malignant neoplasm of thyroid (11/17/2011), PONV (postoperative nausea and vomiting), Right leg pain (08/19/2020), Stress, Thyroid disease, Visual impairment, Wears glasses, and Weight gain.  Surgical History:  She  has a past surgical history that includes cystoscopy,+ureteroscopy (Left, 5/4/17); other surgical history (1/1/2005); other surgical history (05/2019); valeria localization wire 1 site left (cpt=19281) (AGE 25); total knee replacement (Bilateral); knee replacement surgery (Left, 4/18/16); knee replacement surgery (Right, 03/22/2019); and knee replacement surgery (Right, 2020).   Family History:  Her family history includes Breast Cancer in her sister and another family member; Cancer in her daughter; Heart Disease in her father and mother.  Social History:  She  reports that she has never smoked. She has never used smokeless tobacco. She reports that she does not currently use alcohol. She reports that she does not use drugs.    Tobacco:  She has never smoked tobacco.    CAGE Alcohol Screen:   CAGE screening score of 0 on 4/4/2024, showing low risk of alcohol abuse.      Patient Care Team:  Traci Lang MD as PCP - General (Internal Medicine)  Geno Naqvi MD as Consulting Physician (ENDOCRINOLOGY)  Peter Serna as Consulting Physician (OPHTHALMOLOGY)  Raissa Isaacs MD as Consulting Physician (NEUROLOGY)  An Russell PT as Physical Therapist (Physical Therapy)  Jocelynn Chin APRN (Nurse Practitioner)    Review of Systems  As in HPI    Objective:   Physical Exam  PHYSICAL EXAM:   General: no  acute distress   Eyes: pupils equal and reactive; EOMI; sclera normal; conjunctiva normal   ENT:without erythema or exudate  Neck: trachea midline; no adenopathy;  Hematologic/lymphatic:no cervical lymphadenopathy; no supraclavicular adenopathy   Respiratory: no increased work of breathing; good air exchange; CTAB; no crackles or wheezing   Cardiovascular: RRR; S1, S2; no murmurs; no carotid bruits; no edema   Gastrointestinal: normal bowel sounds; soft; non-distended;  Neurological: awake, alert, oriented x3; CNII-XII grossly intact;  MSK: pain in R hip. Reports BL hand pain with gripping-reports following with ortho.   Behavioral/Psych: euthymic; appropriate affect   Skin: papules on face  Breasts: symmetrical; no masses or nipple discharge or rashes/lesions noted   : deferred         /60 (BP Location: Right arm, Patient Position: Sitting, Cuff Size: adult)   Pulse 75   Temp 98.1 °F (36.7 °C) (Temporal)   Resp 15   Ht 5' 4.5\" (1.638 m)   Wt 207 lb 6.4 oz (94.1 kg)   SpO2 96%   BMI 35.05 kg/m²  Estimated body mass index is 35.05 kg/m² as calculated from the following:    Height as of this encounter: 5' 4.5\" (1.638 m).    Weight as of this encounter: 207 lb 6.4 oz (94.1 kg).    Medicare Hearing Assessment:   Hearing Screening    Time taken: 4/4/2024  1:03 PM  Entry User: Jerry Coffman MA  Screening Method: Finger Rub  Finger Rub Result: Fail         Visual Acuity:   Right Eye Visual Acuity: Corrected Right Eye Chart Acuity: 20/70   Left Eye Visual Acuity: Corrected Left Eye Chart Acuity: 20/70   Both Eyes Visual Acuity: Corrected Both Eyes Chart Acuity: 20/40   Able To Tolerate Visual Acuity: Yes        Assessment & Plan:   Jacqui Martínez is a 68 year old female who presents for a Medicare Assessment.   Jacqui was seen today for wellness visit.    Diagnoses and all orders for this visit:    Encounter for annual wellness visit (AWV) in Medicare patient  -     Vitamin D; Future  -     CBC  With Differential With Platelet; Future  -     Comp Metabolic Panel (14); Future  -     Hemoglobin A1C; Future  -     Lipid Panel; Future    Vitamin D deficiency  -     Vitamin D; Future  -     CBC With Differential With Platelet; Future  -     Comp Metabolic Panel (14); Future  -     Hemoglobin A1C; Future  -     Lipid Panel; Future    Hypothyroidism, unspecified type  -     Vitamin D; Future  -     CBC With Differential With Platelet; Future  -     Comp Metabolic Panel (14); Future  -     Hemoglobin A1C; Future  -     Lipid Panel; Future  -levothyroxine as per endo. Reports recent labs and med changes. Records requested.     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  -also reports follow-up with GI coming up     Multiple lesions of face  -     Derm Referral - In Network    Severe obesity (BMI 35.0-39.9) with comorbidity (HCC)  -diet/exercise    JUANCHO (generalized anxiety disorder)  -reports stable   Meningioma (HCC)  -     MRI BRAIN (W+WO) (CPT=70553); Future    MADRID (dyspnea on exertion)  -had stress test wnl   -     [facility] CARDIOLOG[facility] EXTERNAL      Pain of right hip  -     XR Hip (non-replaced) right w or wo pelvis - [facility] Ortho Consult Only; Future    Chronic breast pain  Comments:  L; has hag mammogram and US  Orders:  -     Surgery Referral - In Network    Glaucoma- follows with  ophthalmology         The patient indicates understanding of these issues and agrees to the plan.  Reinforced healthy diet, lifestyle, and exercise.      No follow-ups on file.     Isabela Romano MD, 4/4/2024     Supplementary Documentation:   General Health:  In the past six months, have you lost more than 10 pounds without trying?: 2 - No  Has your appetite been poor?: No  Type of Diet: Balanced  How does the patient maintain a good energy level?: Daily Walks;Stretching;Other  How would you describe your daily physical activity?: Moderate  How would you describe your current health state?: Good  How do you maintain positive mental well-being?: Visiting Family  On a scale of 0 to 10, with 0 being no pain and 10 being severe pain, what is your pain level?: 5 - (Moderate)  In the past six months, have you experienced urine leakage?: 1-Yes  At any time do you feel concerned for the safety/well-being of yourself and/or your children, in your home or elsewhere?: Yes  Have you had any immunizations at another office such as Influenza, Hepatitis B, Tetanus, or Pneumococcal?: Yes       Jacqui Martínez's SCREENING SCHEDULE   Tests on this list are recommended by your physician but may not be covered, or covered at this frequency, by your insurer.   Please check with your insurance carrier before scheduling to verify coverage.   PREVENTATIVE SERVICES FREQUENCY &  COVERAGE DETAILS LAST COMPLETION DATE   Diabetes Screening    Fasting Blood Sugar /  Glucose    One screening every 12 months if never tested or if previously tested but not diagnosed with pre-diabetes   One screening every 6 months if diagnosed with pre-diabetes Lab Results   Component Value Date    GLU 89 05/30/2023        Cardiovascular Disease Screening    Lipid Panel  Cholesterol  Lipoprotein (HDL)  Triglycerides Covered every 5 years for all Medicare beneficiaries without apparent signs or symptoms of cardiovascular disease Lab Results   Component Value  Date    CHOLEST 162 09/21/2022    HDL 59 09/21/2022    LDL 88 09/21/2022    TRIG 81 09/21/2022         Electrocardiogram (EKG)   Covered if needed at Welcome to Medicare, and non-screening if indicated for medical reasons 02/18/2023      Ultrasound Screening for Abdominal Aortic Aneurysm (AAA) Covered once in a lifetime for one of the following risk factors    Men who are 65-75 years old and have ever smoked    Anyone with a family history -     Colorectal Cancer Screening  Covered for ages 50-85; only need ONE of the following:    Colonoscopy   Covered every 10 years    Covered every 2 years if patient is at high risk or previous colonoscopy was abnormal 08/19/2021    Health Maintenance   Topic Date Due    Colorectal Cancer Screening  08/19/2031       Flexible Sigmoidoscopy   Covered every 4 years -    Fecal Occult Blood Test Covered annually -   Bone Density Screening    Bone density screening    Covered every 2 years after age 65 if diagnosed with risk of osteoporosis or estrogen deficiency.    Covered yearly for long-term glucocorticoid medication use (Steroids) Last Dexa Scan:    XR DEXA BONE DENSITOMETRY (CPT=77080) 08/25/2023      No recommendations at this time   Pap and Pelvic    Pap   Covered every 2 years for women at normal risk; Annually if at high risk -  No recommendations at this time    Chlamydia Annually if high risk -  No recommendations at this time   Screening Mammogram    Mammogram     Recommend annually for all female patients aged 40 and older    One baseline mammogram covered for patients aged 35-39 11/22/2023    Health Maintenance   Topic Date Due    Mammogram  11/22/2024       Immunizations    Influenza Covered once per flu season  Please get every year -  No recommendations at this time    Pneumococcal Each vaccine (Rgutvmr09 & Hyqyrbiac85) covered once after 65 Prevnar 13: 10/10/2020    [id number]: 10/25/2021     No recommendations at this time    Hepatitis B One screening covered for  patients with certain risk factors   -  No recommendations at this time    Tetanus Toxoid Not covered by Medicare Part B unless medically necessary (cut with metal); may be covered with your pharmacy prescription benefits 06/23/2008    Tetanus, Diptheria and Pertusis TD and TDaP Not covered by Medicare Part B -  No recommendations at this time    Zoster Not covered by Medicare Part B; may be covered with your pharmacy  prescription benefits -  Zoster Vaccines(1 of 2) Never done     Annual Monitoring of Persistent Medications (ACE/ARB, digoxin diuretics, anticonvulsants)    Potassium Annually Lab Results   Component Value Date    K 3.9 05/30/2023         Creatinine   Annually Lab Results   Component Value Date    CREATSERUM 0.64 05/30/2023         BUN Annually Lab Results   Component Value Date    BUN 18 05/30/2023       Drug Serum Conc Annually No results found for: \"DIGOXIN\", \"DIG\", \"VALP\"

## 2024-04-08 ENCOUNTER — LAB ENCOUNTER (OUTPATIENT)
Dept: LAB | Age: 69
End: 2024-04-08
Attending: INTERNAL MEDICINE
Payer: MEDICARE

## 2024-04-08 DIAGNOSIS — E55.9 VITAMIN D DEFICIENCY: ICD-10-CM

## 2024-04-08 DIAGNOSIS — Z00.00 ENCOUNTER FOR ANNUAL WELLNESS VISIT (AWV) IN MEDICARE PATIENT: ICD-10-CM

## 2024-04-08 DIAGNOSIS — E03.9 HYPOTHYROIDISM, UNSPECIFIED TYPE: ICD-10-CM

## 2024-04-08 DIAGNOSIS — M25.50 POLYARTHRALGIA: ICD-10-CM

## 2024-04-08 LAB
ALBUMIN SERPL-MCNC: 4.4 G/DL (ref 3.2–4.8)
ALBUMIN/GLOB SERPL: 1.5 {RATIO} (ref 1–2)
ALP LIVER SERPL-CCNC: 69 U/L
ALT SERPL-CCNC: 23 U/L
ANION GAP SERPL CALC-SCNC: 7 MMOL/L (ref 0–18)
AST SERPL-CCNC: 20 U/L (ref ?–34)
BASOPHILS # BLD AUTO: 0.03 X10(3) UL (ref 0–0.2)
BASOPHILS NFR BLD AUTO: 0.4 %
BILIRUB SERPL-MCNC: 0.6 MG/DL (ref 0.2–1.1)
BUN BLD-MCNC: 12 MG/DL (ref 9–23)
BUN/CREAT SERPL: 17.1 (ref 10–20)
CALCIUM BLD-MCNC: 9.7 MG/DL (ref 8.7–10.4)
CHLORIDE SERPL-SCNC: 106 MMOL/L (ref 98–112)
CHOLEST SERPL-MCNC: 147 MG/DL (ref ?–200)
CO2 SERPL-SCNC: 27 MMOL/L (ref 21–32)
CREAT BLD-MCNC: 0.7 MG/DL
DEPRECATED RDW RBC AUTO: 40.5 FL (ref 35.1–46.3)
EGFRCR SERPLBLD CKD-EPI 2021: 94 ML/MIN/1.73M2 (ref 60–?)
EOSINOPHIL # BLD AUTO: 0.84 X10(3) UL (ref 0–0.7)
EOSINOPHIL NFR BLD AUTO: 11.5 %
ERYTHROCYTE [DISTWIDTH] IN BLOOD BY AUTOMATED COUNT: 12.5 % (ref 11–15)
EST. AVERAGE GLUCOSE BLD GHB EST-MCNC: 100 MG/DL (ref 68–126)
FASTING PATIENT LIPID ANSWER: YES
FASTING STATUS PATIENT QL REPORTED: YES
GLOBULIN PLAS-MCNC: 2.9 G/DL (ref 2.8–4.4)
GLUCOSE BLD-MCNC: 91 MG/DL (ref 70–99)
HBA1C MFR BLD: 5.1 % (ref ?–5.7)
HCT VFR BLD AUTO: 38.5 %
HDLC SERPL-MCNC: 48 MG/DL (ref 40–59)
HGB BLD-MCNC: 13.1 G/DL
IMM GRANULOCYTES # BLD AUTO: 0.02 X10(3) UL (ref 0–1)
IMM GRANULOCYTES NFR BLD: 0.3 %
LDLC SERPL CALC-MCNC: 84 MG/DL (ref ?–100)
LYMPHOCYTES # BLD AUTO: 2.24 X10(3) UL (ref 1–4)
LYMPHOCYTES NFR BLD AUTO: 30.6 %
MCH RBC QN AUTO: 29.8 PG (ref 26–34)
MCHC RBC AUTO-ENTMCNC: 34 G/DL (ref 31–37)
MCV RBC AUTO: 87.7 FL
MONOCYTES # BLD AUTO: 0.45 X10(3) UL (ref 0.1–1)
MONOCYTES NFR BLD AUTO: 6.2 %
NEUTROPHILS # BLD AUTO: 3.73 X10 (3) UL (ref 1.5–7.7)
NEUTROPHILS # BLD AUTO: 3.73 X10(3) UL (ref 1.5–7.7)
NEUTROPHILS NFR BLD AUTO: 51 %
NONHDLC SERPL-MCNC: 99 MG/DL (ref ?–130)
OSMOLALITY SERPL CALC.SUM OF ELEC: 289 MOSM/KG (ref 275–295)
PLATELET # BLD AUTO: 330 10(3)UL (ref 150–450)
POTASSIUM SERPL-SCNC: 4.2 MMOL/L (ref 3.5–5.1)
PROT SERPL-MCNC: 7.3 G/DL (ref 5.7–8.2)
RBC # BLD AUTO: 4.39 X10(6)UL
SODIUM SERPL-SCNC: 140 MMOL/L (ref 136–145)
TRIGL SERPL-MCNC: 78 MG/DL (ref 30–149)
VIT D+METAB SERPL-MCNC: 17.5 NG/ML (ref 30–100)
VLDLC SERPL CALC-MCNC: 12 MG/DL (ref 0–30)
WBC # BLD AUTO: 7.3 X10(3) UL (ref 4–11)

## 2024-04-08 PROCEDURE — 86039 ANTINUCLEAR ANTIBODIES (ANA): CPT

## 2024-04-08 PROCEDURE — 82306 VITAMIN D 25 HYDROXY: CPT

## 2024-04-08 PROCEDURE — 85025 COMPLETE CBC W/AUTO DIFF WBC: CPT

## 2024-04-08 PROCEDURE — 80053 COMPREHEN METABOLIC PANEL: CPT

## 2024-04-08 PROCEDURE — 86038 ANTINUCLEAR ANTIBODIES: CPT

## 2024-04-08 PROCEDURE — 36415 COLL VENOUS BLD VENIPUNCTURE: CPT

## 2024-04-08 PROCEDURE — 83036 HEMOGLOBIN GLYCOSYLATED A1C: CPT

## 2024-04-08 PROCEDURE — 80061 LIPID PANEL: CPT

## 2024-04-09 ENCOUNTER — TELEPHONE (OUTPATIENT)
Dept: INTERNAL MEDICINE CLINIC | Facility: CLINIC | Age: 69
End: 2024-04-09

## 2024-04-09 LAB
ANA NUCLEOLAR TITR SER IF: 80 {TITER}
NUCLEAR IGG TITR SER IF: POSITIVE {TITER}

## 2024-04-09 NOTE — TELEPHONE ENCOUNTER
Call from Eula at Dr. Snider office.  Concerned new referral auto-authorized without being reviewed by insurance.  Eula asked to resubmit with note asking to have insurance actually view and approve and to add the following office visit code [id number].      In-basket message sent to [facility] Referral Pool informing of above. Awaiting referral department response.

## 2024-04-17 ENCOUNTER — HOSPITAL ENCOUNTER (OUTPATIENT)
Dept: GENERAL RADIOLOGY | Age: 69
Discharge: HOME | End: 2024-04-17
Attending: INTERNAL MEDICINE
Payer: MEDICARE

## 2024-04-17 DIAGNOSIS — M25.551 PAIN OF RIGHT HIP: ICD-10-CM

## 2024-04-18 ENCOUNTER — HOSPITAL ENCOUNTER (OUTPATIENT)
Dept: GENERAL RADIOLOGY | Age: 69
Discharge: HOME | End: 2024-04-18
Attending: INTERNAL MEDICINE
Payer: MEDICARE

## 2024-04-18 PROCEDURE — 73502 X-RAY EXAM HIP UNI 2-3 VIEWS: CPT | Performed by: INTERNAL MEDICINE

## 2024-04-23 RX ORDER — CALCITRIOL 0.5 UG/1
0.5 CAPSULE, LIQUID FILLED ORAL DAILY
Qty: 90 CAPSULE | Refills: 1 | Status: SHIPPED | OUTPATIENT
Start: 2024-04-23

## 2024-04-23 NOTE — TELEPHONE ENCOUNTER
Advance Refill Approval Request for CALCITRIOL 0.5 MCG CAPSULES    LOV: 4/4/2024 with Dr. Adams  RTC: no follow-up on file  Last Relevant Labs: 4/8/2024  Filled: 1/11/2024    #90 with 1 refill    Future Appointments   Date Time Provider Department Center   5/28/2024  6:00 PM  MR RM2 (1.5T WIDE)  [facility]   6/4/2024  9:40 AM Isabela Allen MD [facility] [place]   6/12/2024 10:00 AM Jo Ann Snider MD [facility] [address]   6/12/2024  1:30 PM Jocelynn Chin APRN [facility]P [facility] GI   8/13/2024 11:00 AM Rizwana Hess MD [facility] [address]

## 2024-04-29 ENCOUNTER — OFFICE VISIT (OUTPATIENT)
Dept: INTERNAL MEDICINE CLINIC | Facility: CLINIC | Age: 69
End: 2024-04-29
Payer: MEDICARE

## 2024-04-29 VITALS
HEART RATE: 81 BPM | RESPIRATION RATE: 16 BRPM | HEIGHT: 64 IN | SYSTOLIC BLOOD PRESSURE: 136 MMHG | WEIGHT: 210.81 LBS | BODY MASS INDEX: 35.99 KG/M2 | OXYGEN SATURATION: 98 % | TEMPERATURE: 98 F | DIASTOLIC BLOOD PRESSURE: 80 MMHG

## 2024-04-29 DIAGNOSIS — M25.512 ACUTE PAIN OF LEFT SHOULDER: ICD-10-CM

## 2024-04-29 DIAGNOSIS — I10 PRIMARY HYPERTENSION: Chronic | ICD-10-CM

## 2024-04-29 DIAGNOSIS — F41.1 GAD (GENERALIZED ANXIETY DISORDER): Chronic | ICD-10-CM

## 2024-04-29 DIAGNOSIS — M79.641 BILATERAL HAND PAIN: Primary | ICD-10-CM

## 2024-04-29 DIAGNOSIS — M79.642 BILATERAL HAND PAIN: Primary | ICD-10-CM

## 2024-04-29 PROCEDURE — 1159F MED LIST DOCD IN RCRD: CPT | Performed by: INTERNAL MEDICINE

## 2024-04-29 PROCEDURE — 3008F BODY MASS INDEX DOCD: CPT | Performed by: INTERNAL MEDICINE

## 2024-04-29 PROCEDURE — 3079F DIAST BP 80-89 MM HG: CPT | Performed by: INTERNAL MEDICINE

## 2024-04-29 PROCEDURE — 99214 OFFICE O/P EST MOD 30 MIN: CPT | Performed by: INTERNAL MEDICINE

## 2024-04-29 PROCEDURE — 1160F RVW MEDS BY RX/DR IN RCRD: CPT | Performed by: INTERNAL MEDICINE

## 2024-04-29 PROCEDURE — 3075F SYST BP GE 130 - 139MM HG: CPT | Performed by: INTERNAL MEDICINE

## 2024-04-29 NOTE — PROGRESS NOTES
Jacqui Martínez is a 68 year old female.   HPI:   Patient presents with several issues.  She has been dealing with pain in both hands, also in left shoulder.  Intermittent - some days are better than others.  Shoulder pain is more constant.  Worse when rotating/moving left arm.  For past 1-2 months.  No specific injury to these areas.      The primary encounter diagnosis was Bilateral hand pain. Diagnoses of Acute pain of left shoulder, Primary hypertension, and JUANCHO (generalized anxiety disorder) were also pertinent to this visit.    Other chronic issues:  Hypertension - systolic blood pressure mildly elevated.  She notes she usually feels/gets headaches when blood pressures are running higher.  Generalized anxiety disorder - stable symptoms.  However due to her anxiety and depressive symptoms she reports she is unable to work.    Past medical, family, surgical and social history were reviewed as listed in the chart, and are unchanged from previous visit on 4/4/2024  REVIEW OF SYSTEMS:   GENERAL/ const: no fevers/chills, no unintentional weight loss  LUNGS: denies shortness of breath   CARDIOVASCULAR: denies chest pain  GI: denies nausea/emesis/ abdominal pain diarrhea constipation  : denies dysuria   MUSCULOSKELETAL: bilateral hand pain, left shoulder pain  NEURO: denies headaches  PSYCHIATRIC: anxiety and depression  ENDOCRINE: no hot/cold intolerance  ALLERGY: No Known Allergies  PAST HISTORY:     Current Outpatient Medications:     calcitRIOL 0.5 MCG Oral Cap, Take 1 capsule (0.5 mcg total) by mouth daily., Disp: 90 capsule, Rfl: 1    famotidine (PEPCID) 40 MG Oral Tab, Take 1 tablet (40 mg total) by mouth daily., Disp: 90 tablet, Rfl: 0    pantoprazole 40 MG Oral Tab EC, Take 1 tablet (40 mg total) by mouth every morning before breakfast., Disp: 90 tablet, Rfl: 3    fluticasone propionate 50 MCG/ACT Nasal Suspension, 2 sprays by Each Nare route daily., Disp: 1 each, Rfl: 0    Meloxicam 7.5 MG Oral Tab, Take 1  tablet (7.5 mg total) by mouth daily as needed for Pain. Do NOT take other NSAIDs with this medication., Disp: 30 tablet, Rfl: 0    ALPRAZolam 0.25 MG Oral Tab, Take 1 tablet (0.25 mg total) by mouth 2 (two) times daily as needed for Anxiety., Disp: 30 tablet, Rfl: 0    losartan 100 MG Oral Tab, Take 1 tablet (100 mg total) by mouth daily., Disp: 90 tablet, Rfl: 3    levothyroxine 150 MCG Oral Tab, Take 1 tablet (150 mcg total) by mouth daily., Disp: , Rfl:   Medical:  has a past medical history of Abdominal distention, Abdominal pain, Anemia, Anxiety, Arthritis, Back pain, Benign paroxysmal positional vertigo due to bilateral vestibular disorder (02/20/2018), Bloating, Calculus of kidney, Cancer (HCC), Constipation, Diarrhea, unspecified, Essential hypertension, Feeling lonely, Glaucoma, Heartburn, History of depression, Hypothyroidism, Indigestion, Irregular bowel habits, Kidney stone, Loss of appetite, Mesenteric adenitis, Mild episode of recurrent major depressive disorder (HCC), Normal delivery (HCC), Pain in joints, Personal history of malignant neoplasm of thyroid (11/17/2011), PONV (postoperative nausea and vomiting), Right leg pain (08/19/2020), Stress, Thyroid disease, Visual impairment, Wears glasses, and Weight gain.  Surgical:  has a past surgical history that includes cystoscopy,+ureteroscopy (Left, 5/4/17); other surgical history (1/1/2005); other surgical history (05/2019); valeria localization wire 1 site left (cpt=19281) (AGE 25); total knee replacement (Bilateral); knee replacement surgery (Left, 4/18/16); knee replacement surgery (Right, 03/22/2019); and knee replacement surgery (Right, 2020).  Family: family history includes Breast Cancer in her sister and another family member; Cancer in her daughter; Heart Disease in her father and mother.  Social:  reports that she has never smoked. She has never used smokeless tobacco. She reports that she does not currently use alcohol. She reports that she  does not use drugs.  Wt Readings from Last 6 Encounters:   04/29/24 210 lb 12.8 oz (95.6 kg)   04/17/24 210 lb 3.2 oz (95.3 kg)   04/04/24 207 lb 6.4 oz (94.1 kg)   01/11/24 206 lb 3.2 oz (93.5 kg)   12/18/23 205 lb 9.6 oz (93.3 kg)   10/02/23 205 lb (93 kg)     EXAM:   /80 (BP Location: Right arm, Patient Position: Sitting, Cuff Size: adult)   Pulse 81   Temp 98.2 °F (36.8 °C) (Temporal)   Resp 16   Ht 5' 4\" (1.626 m)   Wt 210 lb 12.8 oz (95.6 kg)   SpO2 98%   Breastfeeding No   BMI 36.18 kg/m²   GENERAL: Alert and oriented, well developed, well nourished,in no apparent distress  HEENT: atraumatic, PERRLA, EOMI, normal lid and conjunctiva  LUNGS: clear to auscultation bilaterally, no wheezing/rubs  CARDIO: RRR without murmurs.  No clubbing, cyanosis or edema.  GI: soft non tender nondistended no hepatosplenomegaly, bowel sounds throughout  NEURO: CN II-XII intact, 5/5 strength all extremities  MS: Full ROM, pain in left shoulder with most movements of left arm, significant shoulder pain with movement of left arm against resistance.  No pain in hands with handgrip.  PSYCH: pleasant, appropriate mood and affect  ASSESSMENT AND PLAN:   1. Bilateral hand pain  2. Acute pain of left shoulder  Patient with bilateral hand pain, left shoulder pain for a few months now.  Hand pain more intermittent, left shoulder pain more consistent.  Her ELVIN test was positive.  She scheduled with Rheumatology but first available appointment with [facility] is in August.  Information provided for other local groups to see if they can see her sooner.  Advised her to contact us if she schedules with someone else so new referral order can be entered.  Will check x-rays as well. May consider MRI shoulder if shoulder pain persists.  - XR HAND BILAT (MIN 3 VIEWS) (CPT=73130-50); Future  - XR SHOULDER, COMPLETE (MIN 2 VIEWS), LEFT (CPT=73030); Future    3. Primary hypertension  Repeat reading at goal.  Continue losartan 100  mg every day.    4. JUANCHO (generalized anxiety disorder)  Stable but chronic symptoms.  She reports she is unable to work     Patient Care Team:  Isabela Allen MD as PCP - General (Internal Medicine)  Geno Naqvi MD as Consulting Physician (ENDOCRINOLOGY)  Peter Serna as Consulting Physician (OPHTHALMOLOGY)  Raissa Isaacs MD as Consulting Physician (NEUROLOGY)  An Russell PT as Physical Therapist (Physical Therapy)  Jocelynn Chin APRN (Nurse Practitioner)  The patient indicates understanding of these issues and agrees to the plan.  The patient is asked to return to clinic in 6-12 months with Dr. Adams for follow up on chronic issues, or earlier if acute issues arise.    Traci Lang MD

## 2024-05-03 ENCOUNTER — HOSPITAL ENCOUNTER (OUTPATIENT)
Dept: GENERAL RADIOLOGY | Age: 69
Discharge: HOME | End: 2024-05-03
Attending: INTERNAL MEDICINE
Payer: MEDICARE

## 2024-05-03 DIAGNOSIS — M79.642 BILATERAL HAND PAIN: ICD-10-CM

## 2024-05-03 DIAGNOSIS — M79.641 BILATERAL HAND PAIN: ICD-10-CM

## 2024-05-03 DIAGNOSIS — M25.512 ACUTE PAIN OF LEFT SHOULDER: ICD-10-CM

## 2024-05-03 PROCEDURE — 73030 X-RAY EXAM OF SHOULDER: CPT | Performed by: INTERNAL MEDICINE

## 2024-05-03 PROCEDURE — 73130 X-RAY EXAM OF HAND: CPT | Performed by: INTERNAL MEDICINE

## 2024-05-05 DIAGNOSIS — M25.512 ACUTE PAIN OF LEFT SHOULDER: ICD-10-CM

## 2024-05-05 DIAGNOSIS — M79.642 BILATERAL HAND PAIN: Primary | ICD-10-CM

## 2024-05-05 DIAGNOSIS — M79.641 BILATERAL HAND PAIN: Primary | ICD-10-CM

## 2024-05-21 DIAGNOSIS — I10 BENIGN ESSENTIAL HTN: ICD-10-CM

## 2024-05-21 RX ORDER — LOSARTAN POTASSIUM 100 MG/1
100 TABLET ORAL DAILY
Qty: 90 TABLET | Refills: 3 | Status: SHIPPED | OUTPATIENT
Start: 2024-05-21

## 2024-05-21 NOTE — TELEPHONE ENCOUNTER
Requesting    losartan 100 MG Oral Tab         Sig: Take 1 tablet (100 mg total) by mouth daily.    Disp: 90 tablet    Refills: 3    Start: 5/21/2024    Class: Normal    Non-formulary For: Benign essential HTN    Last ordered: 11 months ago (5/30/2023) by Isabela Romano MD    Patient comment: Refills    Hypertension Medications Protocol [id number]/21/2024 09:23 AM   Protocol Details CMP or BMP in past 12 months    Last BP reading less than 140/90    In person appointment or virtual visit in the past 12 mos or appointment in next 3 mos    EGFRCR or GFRNAA > 50      To be filled at: [id number] - [place], [place] - [address] AT [facility] [address], [phone], [phone]        LOV: 04/29/24 w/ RP   RTC: 6-12 months   Last Relevant Labs:04/2024  Filled: 05/30/23 #90 with 3 refills    Future Appointments   Date Time Provider Department Center   5/28/2024  6:00 PM  MR RM2 (1.5T WIDE) [facility]   6/12/2024 10:00 AM Jo Ann Snider MD [facility] [address]   6/12/2024  1:30 PM Jocelynn Chin APRN SGINP [facility] SUB GI   8/13/2024 11:00 AM Rizwana Hess MD [facility] [address]

## 2024-06-10 NOTE — CONSULTS
Breast Surgery New Patient Consultation    Jacqui Martínez is a 69 year old patient, referred by Dr. Adams, who presents for evaluation of left breast pain.    History of Present Illness:   Ms. Jacqui Martínez is a 69 year old woman with a past history significant for thyroid cancer who presents for evaluation of left breast pain.    She has had left breast pain for about 40 years. She states when she was 20 she had an abscess drained and since then she has gotten intermittent pain in the left breast. She is unsure how often it bothers her, but it is not daily. She sometimes wears a bra but does not like anything tight on her body.     In April 2023 she underwent imaging to evaluate left breast and axillary pain. There was no correlate. Her last screening mammogram was on 11/23/2023, it showed density B breast tissue, and it was classified as BI-RADS 2.    She denies any palpable masses, nipple discharge, skin changes, or axillary adenopathy. She does not have significant past history for breast diseases or breast biopsy. She does not have a family history of breast cancer.  She may have a half sister with breast cancer, but is unsure as her father had 17 children and she does not keep in contact with all of them. She does not have a history of genetic testing.            Past Medical History:    Abdominal distention    Abdominal pain    Anemia    Anxiety    Arthritis    Back pain    Benign paroxysmal positional vertigo due to bilateral vestibular disorder    Bloating    Calculus of kidney    Cancer (HCC)    thyroid CA 10 yrs ago    Constipation    Diarrhea, unspecified    Essential hypertension    Feeling lonely    Glaucoma    Heartburn    History of depression    Hypothyroidism    Indigestion    Irregular bowel habits    Kidney stone    Loss of appetite    Mesenteric adenitis    Mild episode of recurrent major depressive disorder (HCC)    Normal delivery (HCC)    x3    Pain in joints    Personal history of  malignant neoplasm of thyroid    PONV (postoperative nausea and vomiting)    Right leg pain    Stress    Thyroid disease    Visual impairment    glasses    Wears glasses    Weight gain       Past Surgical History:   Procedure Laterality Date    Cystoscopy,+ureteroscopy Left 17    L URS with BL RPG, passed stone, Dr. Mejia, [facility]    Knee replacement surgery Left 16    Performed by Dr. Jayden Richmond @ [facility]    Knee replacement surgery Right 2019    Dr. Stuart at [facility]    Knee replacement surgery Right     REVISION R TKA - Dr. Mitchell    [facility] localization wire 1 site left (cpt=19281)  AGE 25    CLOGGED DUCT    Other surgical history  2005    thyroid surgery XRT    Other surgical history  2019    R knee CONNIE    Total knee replacement Bilateral        Gynecological History:  Menarche at age 15  Pt is a   Pt was 23 years old at time of first pregnancy.    She has cumulative breastfeeding history of 3 months  Age of Menopause: 49    She denies any history of hormone replacement therapy   She has history of oral contraceptive use for 4 years   She denies infertility treatment to achieve pregnancy.    Medications:     losartan 100 MG Oral Tab Take 1 tablet (100 mg total) by mouth daily. 90 tablet 3    calcitRIOL 0.5 MCG Oral Cap Take 1 capsule (0.5 mcg total) by mouth daily. 90 capsule 1    famotidine (PEPCID) 40 MG Oral Tab Take 1 tablet (40 mg total) by mouth daily. 90 tablet 0    pantoprazole 40 MG Oral Tab EC Take 1 tablet (40 mg total) by mouth every morning before breakfast. 90 tablet 3    fluticasone propionate 50 MCG/ACT Nasal Suspension 2 sprays by Each Nare route daily. 1 each 0    Meloxicam 7.5 MG Oral Tab Take 1 tablet (7.5 mg total) by mouth daily as needed for Pain. Do NOT take other NSAIDs with this medication. 30 tablet 0    ALPRAZolam 0.25 MG Oral Tab Take 1 tablet (0.25 mg total) by mouth 2 (two) times daily as needed for Anxiety. 30 tablet 0     levothyroxine 150 MCG Oral Tab Take 1 tablet (150 mcg total) by mouth daily.         Allergies:    No Known Allergies    Family History:   Family History   Problem Relation Age of Onset    Heart Disease Mother     Heart Disease Father     Cancer Daughter         Daughter       She is not of Ashkenazi Adventism ancestry.    Social History:  History   Alcohol Use Not Currently     Comment: occ       History   Smoking Status    Never   Smokeless Tobacco    Never       Review of Systems:    Review of Systems   Constitutional:  Negative for activity change, appetite change, chills, fatigue and unexpected weight change.   HENT:  Negative for ear pain, hearing loss, nosebleeds, sore throat, trouble swallowing and voice change.    Eyes:  Negative for pain and visual disturbance.   Respiratory:  Negative for cough, chest tightness and shortness of breath.    Cardiovascular:  Negative for chest pain, palpitations and leg swelling.   Gastrointestinal:  Negative for nausea, vomiting, abdominal pain, diarrhea, constipation and blood in stool.   Endocrine: Negative for cold intolerance and heat intolerance.   Genitourinary:  Negative for dysuria, hematuria and difficulty urinating.   Musculoskeletal:  Negative for back pain, joint swelling, joint pain and neck pain.   Skin:  Negative for color change, rash and wound.   Allergic/Immunologic: Negative for environmental allergies.   Neurological:  Negative for tremors, syncope, facial asymmetry, speech difficulty and weakness.   Hematological:  Negative for adenopathy. Does not bruise/bleed easily.   Psychiatric/Behavioral:  Negative for hallucinations, behavioral problems, confusion, agitation and depressed mood.       Otherwise as per HPI.    Physical Exam:    /82 (BP Location: Right arm, Patient Position: Sitting, Cuff Size: adult)   Pulse 76   Temp 96.8 °F (36 °C) (Tympanic)   Resp 16   Wt 94.8 kg (209 lb)   SpO2 96%   BMI 35.87 kg/m²     Physical Exam  Vitals reviewed.    Constitutional:       Appearance: Normal appearance.   HENT:      Head: Normocephalic and atraumatic.   Eyes:      Extraocular Movements: Extraocular movements intact.      Pupils: Pupils are equal, round, and reactive to light.   Cardiovascular:      Rate and Rhythm: Normal rate.   Pulmonary:      Effort: Pulmonary effort is normal.   Chest:   Breasts:     Right: Normal. No inverted nipple, mass, nipple discharge, skin change or tenderness.      Left: Normal. No inverted nipple, mass, nipple discharge, skin change or tenderness.          Comments: Area of increased density unilaterally when patient supine, no discreet mass. Dense area tender to deep palpation  Abdominal:      General: Abdomen is flat.      Palpations: Abdomen is soft.   Musculoskeletal:         General: Normal range of motion.      Cervical back: Normal range of motion and neck supple.   Lymphadenopathy:      Upper Body:      Right upper body: No supraclavicular or axillary adenopathy.      Left upper body: No supraclavicular or axillary adenopathy.   Skin:     General: Skin is warm and dry.   Neurological:      General: No focal deficit present.      Mental Status: She is alert and oriented to person, place, and time.   Psychiatric:         Mood and Affect: Mood normal.          Bra size: 40DD    Labs/imaging: reviewed in EPIC    Impression:   Ms. Jacqui Martínez is a 69 year old woman that presents for evaluation of breast pain    Discussion and Plan:  I had a discussion with the Patient regarding her breast exam. On exam today I found no evidence of disease. I personally reviewed her recent imaging and we discussed this.    We discussed the patient's breast pain. I explained that breast pain is usually secondary to hormonal shifts and not usually a sign of worrisome pathology. There were no masses on physical exam and her most recent imaging is reassuring. I explained that most of the time breast pain will dissipate with time. We also  discussed abstaining from caffeine. We discussed wearing a good, supportive, well-fitted bra in the correct size. If needed, the bra can be worn 24/7 for support. The patient may use NSAIDs as needed and primrose oil may be helpful.     Further screening:   Mammogram: next screening mammogram 11/2024 (ordered)    Return to clinic:   1 year for breast exam or as needed  See PCP/OB/GYN at least annually for additional breast exam    She was given ample opportunity for questions and those questions were answered to her satisfaction. She has been encouraged to contact the office with any questions or concerns prior to her next appointment. This encounter lasted a total of 30 minutes, more than 50% of which was dedicated to the discussion of management options.     Jo Ann Snider MD  Breast Surgical Oncology    CC: Dr. Angie Lang

## 2024-06-12 ENCOUNTER — OFFICE VISIT (OUTPATIENT)
Facility: CLINIC | Age: 69
End: 2024-06-12
Payer: MEDICARE

## 2024-06-12 VITALS
DIASTOLIC BLOOD PRESSURE: 82 MMHG | OXYGEN SATURATION: 96 % | WEIGHT: 209 LBS | SYSTOLIC BLOOD PRESSURE: 144 MMHG | RESPIRATION RATE: 16 BRPM | BODY MASS INDEX: 36 KG/M2 | TEMPERATURE: 97 F | HEART RATE: 76 BPM

## 2024-06-12 DIAGNOSIS — Z12.31 ENCOUNTER FOR SCREENING MAMMOGRAM FOR MALIGNANT NEOPLASM OF BREAST: Primary | ICD-10-CM

## 2024-06-12 PROCEDURE — 3077F SYST BP >= 140 MM HG: CPT | Performed by: SURGERY

## 2024-06-12 PROCEDURE — 99203 OFFICE O/P NEW LOW 30 MIN: CPT | Performed by: SURGERY

## 2024-06-12 PROCEDURE — 1160F RVW MEDS BY RX/DR IN RCRD: CPT | Performed by: SURGERY

## 2024-06-12 PROCEDURE — 1159F MED LIST DOCD IN RCRD: CPT | Performed by: SURGERY

## 2024-06-12 PROCEDURE — 3079F DIAST BP 80-89 MM HG: CPT | Performed by: SURGERY

## 2024-07-11 DIAGNOSIS — M79.604 PAIN OF RIGHT LOWER EXTREMITY: ICD-10-CM

## 2024-07-11 DIAGNOSIS — K21.9 GASTROESOPHAGEAL REFLUX DISEASE, UNSPECIFIED WHETHER ESOPHAGITIS PRESENT: Chronic | ICD-10-CM

## 2024-07-11 DIAGNOSIS — M25.551 PAIN OF RIGHT HIP: ICD-10-CM

## 2024-07-12 RX ORDER — PANTOPRAZOLE SODIUM 40 MG/1
40 TABLET, DELAYED RELEASE ORAL
Qty: 90 TABLET | Refills: 3 | OUTPATIENT
Start: 2024-07-12

## 2024-07-12 RX ORDER — MELOXICAM 7.5 MG/1
7.5 TABLET ORAL DAILY PRN
Qty: 30 TABLET | Refills: 0 | Status: SHIPPED | OUTPATIENT
Start: 2024-07-12

## 2024-07-12 NOTE — TELEPHONE ENCOUNTER
Meloxicam 7.5 mg  Filled 12-18-23  Qty 30  0 refills  Future Appointments   Date Time Provider Department Center   7/15/2024 10:00 AM Isabela Allen MD EMG 8 [facility] [place]   8/2/2024 12:15 PM  MR RM4 (3T WIDE)  [facility]   LOV 4-4-24 DV

## 2024-07-15 ENCOUNTER — OFFICE VISIT (OUTPATIENT)
Dept: INTERNAL MEDICINE CLINIC | Facility: CLINIC | Age: 69
End: 2024-07-15
Payer: MEDICARE

## 2024-07-15 VITALS
TEMPERATURE: 98 F | BODY MASS INDEX: 36 KG/M2 | WEIGHT: 211 LBS | OXYGEN SATURATION: 98 % | RESPIRATION RATE: 15 BRPM | HEART RATE: 78 BPM | DIASTOLIC BLOOD PRESSURE: 72 MMHG | SYSTOLIC BLOOD PRESSURE: 126 MMHG

## 2024-07-15 DIAGNOSIS — I10 BENIGN ESSENTIAL HTN: Primary | ICD-10-CM

## 2024-07-15 DIAGNOSIS — E03.9 HYPOTHYROIDISM, UNSPECIFIED TYPE: ICD-10-CM

## 2024-07-15 DIAGNOSIS — R79.89 ABNORMAL TSH: ICD-10-CM

## 2024-07-15 DIAGNOSIS — E55.9 VITAMIN D DEFICIENCY: ICD-10-CM

## 2024-07-15 DIAGNOSIS — M25.50 POLYARTHRALGIA: ICD-10-CM

## 2024-07-15 DIAGNOSIS — R76.8 ANA POSITIVE: ICD-10-CM

## 2024-07-15 PROCEDURE — 1159F MED LIST DOCD IN RCRD: CPT | Performed by: INTERNAL MEDICINE

## 2024-07-15 PROCEDURE — 3078F DIAST BP <80 MM HG: CPT | Performed by: INTERNAL MEDICINE

## 2024-07-15 PROCEDURE — 99214 OFFICE O/P EST MOD 30 MIN: CPT | Performed by: INTERNAL MEDICINE

## 2024-07-15 PROCEDURE — 1160F RVW MEDS BY RX/DR IN RCRD: CPT | Performed by: INTERNAL MEDICINE

## 2024-07-15 PROCEDURE — G2211 COMPLEX E/M VISIT ADD ON: HCPCS | Performed by: INTERNAL MEDICINE

## 2024-07-15 PROCEDURE — 3074F SYST BP LT 130 MM HG: CPT | Performed by: INTERNAL MEDICINE

## 2024-07-15 RX ORDER — AMLODIPINE BESYLATE 10 MG/1
10 TABLET ORAL DAILY
Qty: 90 TABLET | Refills: 3 | Status: SHIPPED | OUTPATIENT
Start: 2024-07-15

## 2024-07-15 NOTE — PROGRESS NOTES
Subjective:   Jacqui Martínez is a 69 year old female  who presents for Blood Pressure (Follow Up)       HTN-states that in the afternoon her BP goes to 160s/90s  No acute symptoms.   Today wnl x2.     Hypothyroidism/abnormal TSH- has been referred to endo in but doesn't have appt until next week.   To recheck labs since it has been over 6 months.     Polyarthralgia -diffuse. Had appt with rheum but it was cancelled. To make appt    Vitamin d deficiency- supplements and check labs   History/Other:    Chief Complaint Reviewed and Verified  Nursing Notes Reviewed and   Verified  Tobacco Reviewed  Allergies Reviewed  Medications Reviewed    Medical History Reviewed  Surgical History Reviewed  OB Status Reviewed    Family History Reviewed  Social History Reviewed         Current Outpatient Medications   Medication Sig Dispense Refill    Meloxicam 7.5 MG Oral Tab Take 1 tablet (7.5 mg total) by mouth daily as needed for Pain. Do NOT take other NSAIDs with this medication. 30 tablet 0    amLODIPine 5 MG Oral Tab Take 1 tablet (5 mg total) by mouth daily. 90 tablet 0    losartan 100 MG Oral Tab Take 1 tablet (100 mg total) by mouth daily. 90 tablet 3    calcitRIOL 0.5 MCG Oral Cap Take 1 capsule (0.5 mcg total) by mouth daily. 90 capsule 1    famotidine (PEPCID) 40 MG Oral Tab Take 1 tablet (40 mg total) by mouth daily. 90 tablet 0    pantoprazole 40 MG Oral Tab EC Take 1 tablet (40 mg total) by mouth every morning before breakfast. 90 tablet 3    ALPRAZolam 0.25 MG Oral Tab Take 1 tablet (0.25 mg total) by mouth 2 (two) times daily as needed for Anxiety. 30 tablet 0    levothyroxine 150 MCG Oral Tab Take 1 tablet (150 mcg total) by mouth daily.         Review of Systems:  Pertinent items are noted in HPI.  A comprehensive 10 point review of systems was completed.  Pertinent positives and negatives noted in the the HPI.        Objective:   /60 (BP Location: Left arm, Patient Position: Sitting, Cuff Size:  adult)   Pulse 78   Temp 98 °F (36.7 °C) (Temporal)   Resp 15   Wt 211 lb (95.7 kg)   SpO2 98%   BMI 36.22 kg/m²  Estimated body mass index is 36.22 kg/m² as calculated from the following:    Height as of 4/29/24: 5' 4\" (1.626 m).    Weight as of this encounter: 211 lb (95.7 kg).  PHYSICAL EXAM:   General: no acute distress   Respiratory: no increased work of breathing; good air exchange; CTAB; no crackles or wheezing   Cardiovascular: RRR; S1, S2; no murmurs; no edema   Neurological: awake, alert, oriented x3; CNII-XII grossly intact;   Behavioral/Psych: euthymic; appropriate affect       Assessment & Plan:   Jacqui was seen today for blood pressure.    Diagnoses and all orders for this visit:    Benign essential HTN  -  given her reported hx- increase to   amLODIPine 10 MG Oral Tab; Take 1 tablet (10 mg total) by mouth daily.  -continue losartan  -check thyroid labs again. May need dose adjustment with endo    Vitamin D deficiency  -     Vitamin D [E]; Future    Abnormal TSH  Hypothyroidism, unspecified type  -     TSH and Free T4 [E]; Future  -levothyroxine   -to follow-up with her endocrinologist     Polyarthralgia  ELVIN positive  -     Rheumatology Referral - In Network                Isabela Romano MD

## 2024-07-16 ENCOUNTER — LAB ENCOUNTER (OUTPATIENT)
Dept: LAB | Age: 69
End: 2024-07-16
Attending: INTERNAL MEDICINE
Payer: MEDICARE

## 2024-07-16 DIAGNOSIS — E55.9 VITAMIN D DEFICIENCY: ICD-10-CM

## 2024-07-16 DIAGNOSIS — R79.89 ABNORMAL TSH: ICD-10-CM

## 2024-07-16 DIAGNOSIS — E03.9 HYPOTHYROIDISM, UNSPECIFIED TYPE: ICD-10-CM

## 2024-07-16 DIAGNOSIS — R79.89 LOW VITAMIN D LEVEL: ICD-10-CM

## 2024-07-16 LAB
T4 FREE SERPL-MCNC: 1.6 NG/DL (ref 0.8–1.7)
TSI SER-ACNC: 0.83 MIU/ML (ref 0.55–4.78)
VIT D+METAB SERPL-MCNC: 15 NG/ML (ref 30–100)

## 2024-07-16 PROCEDURE — 84443 ASSAY THYROID STIM HORMONE: CPT

## 2024-07-16 PROCEDURE — 82306 VITAMIN D 25 HYDROXY: CPT

## 2024-07-16 PROCEDURE — 84439 ASSAY OF FREE THYROXINE: CPT

## 2024-07-16 PROCEDURE — 36415 COLL VENOUS BLD VENIPUNCTURE: CPT

## 2024-07-29 RX ORDER — LEVOTHYROXINE SODIUM 0.15 MG/1
150 TABLET ORAL DAILY
Qty: 90 TABLET | Refills: 3 | Status: SHIPPED | OUTPATIENT
Start: 2024-07-29

## 2024-07-29 NOTE — TELEPHONE ENCOUNTER
Requesting    levothyroxine 150 MCG Oral Tab         Sig: Take 1 tablet (150 mcg total) by mouth daily.    Disp: Not specified    Refills: 0    Start: 7/29/2024    Class: Normal    Non-formulary    Last ordered: 1 year ago (5/17/2023) by Reported External/Patient    Thyroid Medication Protocol [id number]/29/2024 10:25 AM   Protocol Details TSH in past 12 months    Last TSH value is normal    In person appointment or virtual visit in the past 12 mos or appointment in next 3 mos      To be filled at: [facility] #[id number] - [place], [place] - [address] AT [facility] [address], [phone], [phone]     LOV: 07/15/24 w/ DVF  RTC: 08/19/24   Last Relevant Labs: 04/08/24    Future Appointments   Date Time Provider Department Center   8/2/2024 12:15 PM E[facility] MR RM4 (3T WIDE)  [facility]   8/19/2024 10:20 AM Isabela Allen MD EMG 8 [facility] [place]   10/22/2024 12:00 PM Rubina Harris MD [facility]   10/29/2024 11:40 AM Rizwana Hess MD [facility] [address]   11/22/2024 11:20 AM PF BRAD RM2 PF [facility] [place]

## 2024-08-07 ENCOUNTER — MED REC SCAN ONLY (OUTPATIENT)
Dept: INTERNAL MEDICINE CLINIC | Facility: CLINIC | Age: 69
End: 2024-08-07

## 2024-08-08 DIAGNOSIS — I10 BENIGN ESSENTIAL HTN: ICD-10-CM

## 2024-08-08 RX ORDER — LOSARTAN POTASSIUM 100 MG/1
100 TABLET ORAL DAILY
Qty: 90 TABLET | Refills: 3 | Status: SHIPPED | OUTPATIENT
Start: 2024-08-08

## 2024-08-08 NOTE — TELEPHONE ENCOUNTER
Requesting    losartan 100 MG Oral Tab         Sig: Take 1 tablet (100 mg total) by mouth daily.    Disp: 90 tablet    Refills: 3    Start: 8/8/2024    Class: Normal    Non-formulary For: Benign essential HTN    Last ordered: 2 months ago (5/21/2024) by Isabela Romano MD    Hypertension Medications Protocol Babrjq1708/08/2024 12:51 PM   Protocol Details CMP or BMP in past 12 months    Last BP reading less than 140/90    In person appointment or virtual visit in the past 12 mos or appointment in next 3 mos    EGFRCR or GFRNAA > 50      To be filled at: [facility] #[id number] - [place], [place] - [address] AT [facility] [address], [phone], [phone]     LOV: 07/15/24 w/ DVF   RTC: 08/19/24  Last Relevant Labs: 04/09/24  Filled: 05/21/24 #90 with 3 refills    Future Appointments   Date Time Provider Department Center   8/19/2024 10:20 AM Isabela Allen MD EMG 8 [facility] [place]   9/14/2024  3:30 PM EH MR RM2 (1.5T WIDE) [facility]   10/22/2024 12:00 PM Rubina Harris MD [facility]   10/29/2024 11:40 AM Rizwana Hess MD [facility] EM[facility] [address]   11/22/2024 11:20 AM PF BRAD RM2 PF [facility] [place]

## 2024-08-09 DIAGNOSIS — M79.604 PAIN OF RIGHT LOWER EXTREMITY: ICD-10-CM

## 2024-08-09 DIAGNOSIS — M25.551 PAIN OF RIGHT HIP: ICD-10-CM

## 2024-08-09 RX ORDER — MELOXICAM 7.5 MG/1
TABLET ORAL
Qty: 30 TABLET | Refills: 0 | Status: SHIPPED | OUTPATIENT
Start: 2024-08-09

## 2024-08-09 NOTE — TELEPHONE ENCOUNTER
Meloxicam 7.5 mg  Filled 7-12-24  Qty 30  0 refills  Future Appointments   Date Time Provider Department Center   8/19/2024 10:20 AM Isabela Allen MD EMG 8 [facility] [place]   9/14/2024  3:30 PM  MR RM2 (1.5T WIDE) [facility]   10/22/2024 12:00 PM Rubina Harris MD [facility]   10/29/2024 11:40 AM Rizwana Hess MD EMGR[facility]UM[facility] EMG [address]   11/22/2024 11:20 AM PF BRAD RM2 PF [facility] 7-15-24 DV

## 2024-08-13 RX ORDER — CALCITRIOL 0.5 UG/1
0.5 CAPSULE, LIQUID FILLED ORAL DAILY
Qty: 90 CAPSULE | Refills: 1 | Status: SHIPPED | OUTPATIENT
Start: 2024-08-13 | End: 2024-08-19

## 2024-08-13 NOTE — TELEPHONE ENCOUNTER
LOV: 7/15/24   RTC: 5 weeks   Labs: 4/8/24   Filled: 4/23/24 #90 1 refill   Future Appointments   Date Time Provider Department Center   8/19/2024 10:20 AM Isabela Allen MD EMG 8 [facility] [place]   9/14/2024  3:30 PM  MR RM2 (1.5T WIDE)  [facility]   10/22/2024 12:00 PM [facility]   11/22/2024 11:20 AM [facility] [place]

## 2024-08-15 RX ORDER — CALCITRIOL 0.5 UG/1
0.5 CAPSULE, LIQUID FILLED ORAL DAILY
Qty: 90 CAPSULE | Refills: 1 | Status: CANCELLED | OUTPATIENT
Start: 2024-08-15

## 2024-08-19 ENCOUNTER — OFFICE VISIT (OUTPATIENT)
Dept: INTERNAL MEDICINE CLINIC | Facility: CLINIC | Age: 69
End: 2024-08-19
Payer: MEDICARE

## 2024-08-19 VITALS
OXYGEN SATURATION: 95 % | SYSTOLIC BLOOD PRESSURE: 130 MMHG | WEIGHT: 210.38 LBS | BODY MASS INDEX: 36 KG/M2 | HEART RATE: 74 BPM | DIASTOLIC BLOOD PRESSURE: 70 MMHG | RESPIRATION RATE: 15 BRPM | TEMPERATURE: 98 F

## 2024-08-19 DIAGNOSIS — R79.81 BORDERLINE LOW OXYGEN SATURATION LEVEL: ICD-10-CM

## 2024-08-19 DIAGNOSIS — M25.50 POLYARTHRALGIA: ICD-10-CM

## 2024-08-19 DIAGNOSIS — E03.9 HYPOTHYROIDISM, UNSPECIFIED TYPE: Chronic | ICD-10-CM

## 2024-08-19 DIAGNOSIS — Z98.890 HISTORY OF PARATHYROID SURGERY: ICD-10-CM

## 2024-08-19 DIAGNOSIS — R76.8 POSITIVE ANA (ANTINUCLEAR ANTIBODY): ICD-10-CM

## 2024-08-19 DIAGNOSIS — E55.9 VITAMIN D DEFICIENCY: ICD-10-CM

## 2024-08-19 DIAGNOSIS — I10 HYPERTENSION, UNSPECIFIED TYPE: Primary | ICD-10-CM

## 2024-08-19 PROCEDURE — 99214 OFFICE O/P EST MOD 30 MIN: CPT | Performed by: INTERNAL MEDICINE

## 2024-08-19 PROCEDURE — 3078F DIAST BP <80 MM HG: CPT | Performed by: INTERNAL MEDICINE

## 2024-08-19 PROCEDURE — 3075F SYST BP GE 130 - 139MM HG: CPT | Performed by: INTERNAL MEDICINE

## 2024-08-19 PROCEDURE — 1160F RVW MEDS BY RX/DR IN RCRD: CPT | Performed by: INTERNAL MEDICINE

## 2024-08-19 PROCEDURE — G2211 COMPLEX E/M VISIT ADD ON: HCPCS | Performed by: INTERNAL MEDICINE

## 2024-08-19 PROCEDURE — 1159F MED LIST DOCD IN RCRD: CPT | Performed by: INTERNAL MEDICINE

## 2024-08-19 RX ORDER — HYDROCHLOROTHIAZIDE 12.5 MG/1
12.5 CAPSULE, GELATIN COATED ORAL DAILY
Qty: 90 CAPSULE | Refills: 0 | Status: SHIPPED | OUTPATIENT
Start: 2024-08-19

## 2024-08-19 RX ORDER — BLOOD PRESSURE TEST KIT
KIT MISCELLANEOUS
Qty: 1 KIT | Refills: 0 | Status: SHIPPED | OUTPATIENT
Start: 2024-08-19

## 2024-08-19 RX ORDER — CALCITRIOL 0.5 UG/1
0.5 CAPSULE, LIQUID FILLED ORAL DAILY
Qty: 90 CAPSULE | Refills: 0 | Status: SHIPPED | OUTPATIENT
Start: 2024-08-19

## 2024-08-19 NOTE — PROGRESS NOTES
Subjective:   Jacqui Martínez is a 69 year old female  who presents for Blood Pressure (Follow Up)       HTN-states that when she checks it; about. SBP 130s-140  Denies cp/sob/palpitations   Hypothyroidism-labs now wnl; on levothyroxine.   Vitamin d deficiency- supplements   To see rheum about polyarthralgia.   SpO2 borderline- pt wo symptoms.     History/Other:    Chief Complaint Reviewed and Verified  Nursing Notes Reviewed and   Verified  Tobacco Reviewed  Allergies Reviewed  Medications Reviewed    OB Status Reviewed         Current Outpatient Medications   Medication Sig Dispense Refill    calcitRIOL 0.5 MCG Oral Cap Take 1 capsule (0.5 mcg total) by mouth daily. 90 capsule 0    hydroCHLOROthiazide 12.5 MG Oral Cap Take 1 capsule (12.5 mg total) by mouth daily. 90 capsule 0    MELOXICAM 7.5 MG Oral Tab TAKE 1 TABLET(7.5 MG) BY MOUTH DAILY AS NEEDED FOR PAIN. DO NOT TAKE OTHER NSAIDS WITH THIS MEDICATION 30 tablet 0    losartan 100 MG Oral Tab Take 1 tablet (100 mg total) by mouth daily. 90 tablet 3    levothyroxine 150 MCG Oral Tab Take 1 tablet (150 mcg total) by mouth daily. 90 tablet 3    famotidine 40 MG Oral Tab Take 1 tablet (40 mg total) by mouth daily. PATIENT NEEDS OFFICE VISIT FOR FURTHER REFILLS 60 tablet 1    amLODIPine 10 MG Oral Tab Take 1 tablet (10 mg total) by mouth daily. 90 tablet 3    pantoprazole 40 MG Oral Tab EC Take 1 tablet (40 mg total) by mouth every morning before breakfast. 90 tablet 3    ALPRAZolam 0.25 MG Oral Tab Take 1 tablet (0.25 mg total) by mouth 2 (two) times daily as needed for Anxiety. 30 tablet 0       Review of Systems:  Pertinent items are noted in HPI.  A comprehensive 10 point review of systems was completed.  Pertinent positives and negatives noted in the the HPI.        Objective:   /70 (BP Location: Left arm, Patient Position: Sitting, Cuff Size: adult)   Pulse 74   Temp 98.3 °F (36.8 °C) (Temporal)   Resp 15   Wt 210 lb 6.4 oz (95.4 kg)   SpO2  95%   BMI 36.12 kg/m²  Estimated body mass index is 36.12 kg/m² as calculated from the following:    Height as of 4/29/24: 5' 4\" (1.626 m).    Weight as of this encounter: 210 lb 6.4 oz (95.4 kg).  PHYSICAL EXAM:   General: no acute distress   Respiratory: no increased work of breathing; good air exchange; CTAB; no crackles or wheezing   Cardiovascular: RRR; S1, S2; no murmurs;no edema   Neurological: awake, alert, oriented x3; CNII-XII grossly intact  Behavioral/Psych: euthymic; appropriate affect       Assessment & Plan:   Jacqui was seen today for blood pressure.    Diagnoses and all orders for this visit:    Hypertension, unspecified type  -add     hydroCHLOROthiazide 12.5 MG Oral Cap; Take 1 capsule (12.5 mg total) by mouth daily.  -continue losartan and amlodipine  -     Basic Metabolic Panel (8) [E]; Future  -     Blood Pressure Does not apply Kit; Check blood pressure daily  Close follow-up     Hypothyroidism, unspecified type  -     Endocrine Referral - In Network  -levothyroxine     History of parathyroid surgery  -     Endocrine Referral - In Network    Vitamin D deficiency  -     calcitRIOL 0.5 MCG Oral Cap; Take 1 capsule (0.5 mcg total) by mouth daily.  -     Endocrine Referral - In Network    Borderline low oxygen saturation level  -     XR CHEST PA + LAT CHEST (CPT=71046); Future    Positive ELVIN (antinuclear antibody)  Polyarthralgia  -     Rheumatology Referral - In Network              Isabela Romano MD

## 2024-09-18 RX ORDER — AMLODIPINE BESYLATE 5 MG/1
5 TABLET ORAL DAILY
Qty: 90 TABLET | Refills: 0 | OUTPATIENT
Start: 2024-09-18

## 2024-09-18 NOTE — TELEPHONE ENCOUNTER
Is pt taking 5mg or 10mg?    Pass    Lov 8/19/24    Hypertension, unspecified type  -add     hydroCHLOROthiazide 12.5 MG Oral Cap; Take 1 capsule (12.5 mg total) by mouth daily.  -continue losartan and amlodipine

## 2024-09-23 ENCOUNTER — OFFICE VISIT (OUTPATIENT)
Dept: INTERNAL MEDICINE CLINIC | Facility: CLINIC | Age: 69
End: 2024-09-23
Payer: MEDICARE

## 2024-09-23 ENCOUNTER — LAB ENCOUNTER (OUTPATIENT)
Dept: LAB | Age: 69
End: 2024-09-23
Attending: INTERNAL MEDICINE
Payer: MEDICARE

## 2024-09-23 VITALS
TEMPERATURE: 98 F | DIASTOLIC BLOOD PRESSURE: 68 MMHG | BODY MASS INDEX: 37 KG/M2 | SYSTOLIC BLOOD PRESSURE: 126 MMHG | RESPIRATION RATE: 15 BRPM | HEART RATE: 69 BPM | WEIGHT: 213 LBS | OXYGEN SATURATION: 97 %

## 2024-09-23 DIAGNOSIS — Z98.890 HISTORY OF PARATHYROID SURGERY: ICD-10-CM

## 2024-09-23 DIAGNOSIS — I10 HYPERTENSION, UNSPECIFIED TYPE: Primary | ICD-10-CM

## 2024-09-23 DIAGNOSIS — R06.09 DOE (DYSPNEA ON EXERTION): ICD-10-CM

## 2024-09-23 DIAGNOSIS — E03.9 HYPOTHYROIDISM, UNSPECIFIED TYPE: Chronic | ICD-10-CM

## 2024-09-23 DIAGNOSIS — L80 VITILIGO: ICD-10-CM

## 2024-09-23 DIAGNOSIS — I10 HYPERTENSION, UNSPECIFIED TYPE: ICD-10-CM

## 2024-09-23 DIAGNOSIS — D32.9 MENINGIOMA (HCC): Chronic | ICD-10-CM

## 2024-09-23 DIAGNOSIS — E55.9 VITAMIN D DEFICIENCY: ICD-10-CM

## 2024-09-23 LAB
ALBUMIN SERPL-MCNC: 4.1 G/DL (ref 3.2–4.8)
ALBUMIN/GLOB SERPL: 1.2 {RATIO} (ref 1–2)
ALP LIVER SERPL-CCNC: 68 U/L
ALT SERPL-CCNC: 23 U/L
ANION GAP SERPL CALC-SCNC: 4 MMOL/L (ref 0–18)
AST SERPL-CCNC: 24 U/L (ref ?–34)
BILIRUB SERPL-MCNC: 0.6 MG/DL (ref 0.2–1.1)
BUN BLD-MCNC: 14 MG/DL (ref 9–23)
CALCIUM BLD-MCNC: 9.8 MG/DL (ref 8.7–10.4)
CHLORIDE SERPL-SCNC: 107 MMOL/L (ref 98–112)
CO2 SERPL-SCNC: 25 MMOL/L (ref 21–32)
CREAT BLD-MCNC: 0.67 MG/DL
EGFRCR SERPLBLD CKD-EPI 2021: 95 ML/MIN/1.73M2 (ref 60–?)
FASTING STATUS PATIENT QL REPORTED: NO
GLOBULIN PLAS-MCNC: 3.5 G/DL (ref 2–3.5)
GLUCOSE BLD-MCNC: 81 MG/DL (ref 70–99)
OSMOLALITY SERPL CALC.SUM OF ELEC: 282 MOSM/KG (ref 275–295)
POTASSIUM SERPL-SCNC: 3.8 MMOL/L (ref 3.5–5.1)
PROT SERPL-MCNC: 7.6 G/DL (ref 5.7–8.2)
SODIUM SERPL-SCNC: 136 MMOL/L (ref 136–145)
T4 FREE SERPL-MCNC: 1.7 NG/DL (ref 0.8–1.7)
TSI SER-ACNC: 0.74 MIU/ML (ref 0.55–4.78)
VIT D+METAB SERPL-MCNC: 22 NG/ML (ref 30–100)

## 2024-09-23 PROCEDURE — 99214 OFFICE O/P EST MOD 30 MIN: CPT | Performed by: INTERNAL MEDICINE

## 2024-09-23 PROCEDURE — 3074F SYST BP LT 130 MM HG: CPT | Performed by: INTERNAL MEDICINE

## 2024-09-23 PROCEDURE — G2211 COMPLEX E/M VISIT ADD ON: HCPCS | Performed by: INTERNAL MEDICINE

## 2024-09-23 PROCEDURE — 84443 ASSAY THYROID STIM HORMONE: CPT

## 2024-09-23 PROCEDURE — 1160F RVW MEDS BY RX/DR IN RCRD: CPT | Performed by: INTERNAL MEDICINE

## 2024-09-23 PROCEDURE — 3078F DIAST BP <80 MM HG: CPT | Performed by: INTERNAL MEDICINE

## 2024-09-23 PROCEDURE — 80053 COMPREHEN METABOLIC PANEL: CPT

## 2024-09-23 PROCEDURE — 36415 COLL VENOUS BLD VENIPUNCTURE: CPT

## 2024-09-23 PROCEDURE — 82306 VITAMIN D 25 HYDROXY: CPT

## 2024-09-23 PROCEDURE — 1159F MED LIST DOCD IN RCRD: CPT | Performed by: INTERNAL MEDICINE

## 2024-09-23 PROCEDURE — 84439 ASSAY OF FREE THYROXINE: CPT

## 2024-09-23 RX ORDER — HYDRALAZINE HYDROCHLORIDE 50 MG/1
50 TABLET, FILM COATED ORAL 2 TIMES DAILY
Qty: 180 TABLET | Refills: 0 | Status: SHIPPED | OUTPATIENT
Start: 2024-09-23

## 2024-09-23 NOTE — PROGRESS NOTES
Subjective:   Jacqui Martínez is a 69 year old female  who presents for Follow - Up     HTN-controlled today.   Reports that in sometimes her BP goes up to 160s. Does not have her readings. Does not check BP daily. Discussed need to check daily and bring in report so we can make adjustments.   Some ankle swelling- will change amlodipine      Hx of MADRID with unremarkable - due for follow-up with cardiology   No acute symptoms in clinic     Hypothyroidism-labs now wnl; on levothyroxine.   Hx of parathyroid surgery- to follow-up with endo- goes to [place]   Vitamin d deficiency- repeat labs    History/Other:    Chief Complaint Reviewed and Verified  No Further Nursing Notes to   Review  Tobacco Reviewed  Medications Reviewed  OB Status Reviewed         Current Outpatient Medications   Medication Sig Dispense Refill    hydrALAZINE 50 MG Oral Tab Take 1 tablet (50 mg total) by mouth in the morning and 1 tablet (50 mg total) before bedtime. 180 tablet 0    calcitRIOL 0.5 MCG Oral Cap Take 1 capsule (0.5 mcg total) by mouth daily. 90 capsule 0    hydroCHLOROthiazide 12.5 MG Oral Cap Take 1 capsule (12.5 mg total) by mouth daily. 90 capsule 0    Blood Pressure Does not apply Kit Check blood pressure daily 1 kit 0    MELOXICAM 7.5 MG Oral Tab TAKE 1 TABLET(7.5 MG) BY MOUTH DAILY AS NEEDED FOR PAIN. DO NOT TAKE OTHER NSAIDS WITH THIS MEDICATION 30 tablet 0    losartan 100 MG Oral Tab Take 1 tablet (100 mg total) by mouth daily. 90 tablet 3    levothyroxine 150 MCG Oral Tab Take 1 tablet (150 mcg total) by mouth daily. 90 tablet 3    famotidine 40 MG Oral Tab Take 1 tablet (40 mg total) by mouth daily. PATIENT NEEDS OFFICE VISIT FOR FURTHER REFILLS 60 tablet 1    pantoprazole 40 MG Oral Tab EC Take 1 tablet (40 mg total) by mouth every morning before breakfast. 90 tablet 3    ALPRAZolam 0.25 MG Oral Tab Take 1 tablet (0.25 mg total) by mouth 2 (two) times daily as needed for Anxiety. 30 tablet 0       Review of  Systems:  Pertinent items are noted in HPI.  A comprehensive 10 point review of systems was completed.  Pertinent positives and negatives noted in the the HPI.        Objective:   /68 (BP Location: Right arm, Patient Position: Sitting, Cuff Size: adult)   Pulse 69   Temp 98 °F (36.7 °C) (Temporal)   Resp 15   Wt 213 lb (96.6 kg)   SpO2 97%   BMI 36.56 kg/m²  Estimated body mass index is 36.56 kg/m² as calculated from the following:    Height as of 4/29/24: 5' 4\" (1.626 m).    Weight as of this encounter: 213 lb (96.6 kg).  PHYSICAL EXAM:   General: no acute distress   Respiratory: no increased work of breathing; good air exchange; CTAB; no crackles or wheezing   Cardiovascular: RRR; S1, S2; no murmurs; no edema   Neurological: awake, alert, oriented x3; CNII-XII grossly intact;  Behavioral/Psych: euthymic; appropriate affect     Assessment & Plan:   Jacqui was seen today for follow - up.    Diagnoses and all orders for this visit:    Hypertension, unspecified type  - stop amlodipine.   -start    hydrALAZINE 50 MG Oral Tab; Take 1 tablet (50 mg total) by mouth in the morning and 1 tablet (50 mg total) before bedtime.  -continue losartan and hydrochlorothiazide   -     TSH and Free T4 [E]; Future  -     [facility] CARDIOLOGY EXTERNAL    Vitamin D deficiency  -check labs   History of parathyroid surgery  Hypothyroidism, unspecified type  -     TSH and Free T4 [E]; Future given reported vitiligo and more dry skin  -follow-up with endo   -on calcitrioll and levothyroxine     Vitiligo  -     TSH and Free T4 [E]; Future    MADRID (dyspnea on exertion)  -     [facility] CARDIOLOGY EXTERNAL  Discussed when to seek urgent medical attention; voiced understanding       Meningioma - scheduled for MRI          Isabela Romano MD

## 2024-09-23 NOTE — PATIENT INSTRUCTIONS
-para de lissa amlodipine  -debe lissa hydralazine, hydrochlorothiazide y losartan    Debe lissa bowen presion todos los mahmood y escribir en un cuaderno. Debe traer bowen cuaderno a bowen proxima praveen.     Llame se la presion esta arriba de 150/80

## 2024-09-24 ENCOUNTER — TELEPHONE (OUTPATIENT)
Dept: INTERNAL MEDICINE CLINIC | Facility: CLINIC | Age: 69
End: 2024-09-24

## 2024-09-24 NOTE — TELEPHONE ENCOUNTER
I faxed ROR per DVF to [facility] for patient office consultations STATUS CP   Original form sent to scan copy placed in accordion POD 1

## 2024-10-22 ENCOUNTER — LAB ENCOUNTER (OUTPATIENT)
Dept: LAB | Facility: HOSPITAL | Age: 69
End: 2024-10-22
Attending: INTERNAL MEDICINE
Payer: MEDICARE

## 2024-10-22 ENCOUNTER — HOSPITAL ENCOUNTER (OUTPATIENT)
Dept: GENERAL RADIOLOGY | Facility: HOSPITAL | Age: 69
Discharge: HOME | End: 2024-10-22
Attending: INTERNAL MEDICINE
Payer: MEDICARE

## 2024-10-22 ENCOUNTER — OFFICE VISIT (OUTPATIENT)
Dept: RHEUMATOLOGY | Facility: CLINIC | Age: 69
End: 2024-10-22

## 2024-10-22 VITALS
RESPIRATION RATE: 16 BRPM | HEART RATE: 67 BPM | DIASTOLIC BLOOD PRESSURE: 68 MMHG | WEIGHT: 212.13 LBS | SYSTOLIC BLOOD PRESSURE: 142 MMHG | BODY MASS INDEX: 36.22 KG/M2 | HEIGHT: 64 IN

## 2024-10-22 DIAGNOSIS — M54.50 CHRONIC BILATERAL LOW BACK PAIN WITHOUT SCIATICA: ICD-10-CM

## 2024-10-22 DIAGNOSIS — M25.50 POLYARTHRALGIA: ICD-10-CM

## 2024-10-22 DIAGNOSIS — G89.29 CHRONIC BILATERAL LOW BACK PAIN WITHOUT SCIATICA: ICD-10-CM

## 2024-10-22 DIAGNOSIS — R76.8 POSITIVE ANA (ANTINUCLEAR ANTIBODY): ICD-10-CM

## 2024-10-22 DIAGNOSIS — M79.10 MYALGIA: ICD-10-CM

## 2024-10-22 DIAGNOSIS — M25.50 POLYARTHRALGIA: Primary | ICD-10-CM

## 2024-10-22 LAB
CK SERPL-CCNC: 63 U/L
CRP SERPL-MCNC: <0.4 MG/DL (ref ?–1)
DEPRECATED RDW RBC AUTO: 39.7 FL (ref 35.1–46.3)
ERYTHROCYTE [DISTWIDTH] IN BLOOD BY AUTOMATED COUNT: 12.7 % (ref 11–15)
ERYTHROCYTE [SEDIMENTATION RATE] IN BLOOD: 39 MM/HR
HCT VFR BLD AUTO: 39.9 %
HGB BLD-MCNC: 13.3 G/DL
MCH RBC QN AUTO: 28.6 PG (ref 26–34)
MCHC RBC AUTO-ENTMCNC: 33.3 G/DL (ref 31–37)
MCV RBC AUTO: 85.8 FL
PLATELET # BLD AUTO: 335 10(3)UL (ref 150–450)
RBC # BLD AUTO: 4.65 X10(6)UL
RHEUMATOID FACT SERPL-ACNC: <3.5 IU/ML (ref ?–14)
URATE SERPL-MCNC: 5.1 MG/DL
WBC # BLD AUTO: 8.7 X10(3) UL (ref 4–11)

## 2024-10-22 PROCEDURE — 82085 ASSAY OF ALDOLASE: CPT

## 2024-10-22 PROCEDURE — 86225 DNA ANTIBODY NATIVE: CPT

## 2024-10-22 PROCEDURE — 86039 ANTINUCLEAR ANTIBODIES (ANA): CPT

## 2024-10-22 PROCEDURE — 82164 ANGIOTENSIN I ENZYME TEST: CPT

## 2024-10-22 PROCEDURE — 84550 ASSAY OF BLOOD/URIC ACID: CPT

## 2024-10-22 PROCEDURE — 3008F BODY MASS INDEX DOCD: CPT | Performed by: INTERNAL MEDICINE

## 2024-10-22 PROCEDURE — 99204 OFFICE O/P NEW MOD 45 MIN: CPT | Performed by: INTERNAL MEDICINE

## 2024-10-22 PROCEDURE — 86200 CCP ANTIBODY: CPT

## 2024-10-22 PROCEDURE — 86140 C-REACTIVE PROTEIN: CPT

## 2024-10-22 PROCEDURE — 3078F DIAST BP <80 MM HG: CPT | Performed by: INTERNAL MEDICINE

## 2024-10-22 PROCEDURE — 85027 COMPLETE CBC AUTOMATED: CPT

## 2024-10-22 PROCEDURE — 86038 ANTINUCLEAR ANTIBODIES: CPT

## 2024-10-22 PROCEDURE — 3077F SYST BP >= 140 MM HG: CPT | Performed by: INTERNAL MEDICINE

## 2024-10-22 PROCEDURE — 73523 X-RAY EXAM HIPS BI 5/> VIEWS: CPT | Performed by: INTERNAL MEDICINE

## 2024-10-22 PROCEDURE — 85652 RBC SED RATE AUTOMATED: CPT

## 2024-10-22 PROCEDURE — 36415 COLL VENOUS BLD VENIPUNCTURE: CPT

## 2024-10-22 PROCEDURE — 1125F AMNT PAIN NOTED PAIN PRSNT: CPT | Performed by: INTERNAL MEDICINE

## 2024-10-22 PROCEDURE — 86431 RHEUMATOID FACTOR QUANT: CPT

## 2024-10-22 PROCEDURE — 82550 ASSAY OF CK (CPK): CPT

## 2024-10-22 NOTE — PROGRESS NOTES
Jacqui Martínez is a 69 year old female who presents for   Chief Complaint   Patient presents with    Consult     Dx: Positive MOHSEN (antinuclear antibody) (R76.8) Polyarthralgia (M25.50)     .   HPI:     I had the pleasure of seeing Jacqui Martínez on 10/22/2024 for evaluation.     She is a pleasant 69 year old has polyarthralgia, and myalgia with postiive MOHSEN 1:80 . She was referred by DR. Angie Romano.   She had hx of bilater knee replacements , hx of thyroid cancer 20 years ago   She has hx fo hand pain and leg pain.   She feels the pain gradually has increased - over the last 2 years.   She didn't pay attention to it til recenlty. She gets swelling in base of wrist - harleen left wrist. Her ring size is increasdd.   She feels pain in her knuckles.   She used to do phsyical therapy in the past and doenst' like it.   She did it for her lower back, neck , knees and hands.   Back pain increases at night but is all the time.       She had labs in 4/2024 - mohsen 1:80 nucleoloar  She had covid this year - she didn't know she had it - she was screened b/janiya sister had it - no sympotms     She denes  Raynaud's -   her left thumb and fingers get dermatiits   She started using bay leafves for her raynaud's -s eh jsut started it. She has been gettnig skin has itchigin more.   She gets pachy dry skin over her back and over her left ear -   She stopped the bay leaves b/c of the itching. But she feels the pain swelling in her left hand is better.     She gets dry eyes - she has glaucoma,   Hx of chronic back pain - for several years -     Doesn't take pills for pain   She had taken melxoicam after her knee replacements and that helps so she uses this sometiems.   Last night she took it   She has about 5-6/10 pain.   She gets pain in upper back, lower back, hands and legs   Including her knees - she had right knee repalcement twice   Her feet doenst' hurts     Denies peripheral neuropathy  Has baseline sob and cough. Told it was not  cardiac.     Hx of kidney stones.   No Hx of gout -   Hx of mesenteric adenitis noted in the past - recent ct abd pelvis for kidney stone in 2023 - was negative for this.         Wt Readings from Last 2 Encounters:   10/22/24 212 lb 1.6 oz (96.2 kg)   09/23/24 213 lb (96.6 kg)     Body mass index is 36.41 kg/m².      Current Outpatient Medications   Medication Sig Dispense Refill    hydrALAZINE 50 MG Oral Tab Take 1 tablet (50 mg total) by mouth in the morning and 1 tablet (50 mg total) before bedtime. 180 tablet 0    calcitRIOL 0.5 MCG Oral Cap Take 1 capsule (0.5 mcg total) by mouth daily. 90 capsule 0    hydroCHLOROthiazide 12.5 MG Oral Cap Take 1 capsule (12.5 mg total) by mouth daily. 90 capsule 0    Blood Pressure Does not apply Kit Check blood pressure daily 1 kit 0    MELOXICAM 7.5 MG Oral Tab TAKE 1 TABLET(7.5 MG) BY MOUTH DAILY AS NEEDED FOR PAIN. DO NOT TAKE OTHER NSAIDS WITH THIS MEDICATION 30 tablet 0    losartan 100 MG Oral Tab Take 1 tablet (100 mg total) by mouth daily. 90 tablet 3    levothyroxine 150 MCG Oral Tab Take 1 tablet (150 mcg total) by mouth daily. 90 tablet 3    famotidine 40 MG Oral Tab Take 1 tablet (40 mg total) by mouth daily. PATIENT NEEDS OFFICE VISIT FOR FURTHER REFILLS 60 tablet 1    pantoprazole 40 MG Oral Tab EC Take 1 tablet (40 mg total) by mouth every morning before breakfast. 90 tablet 3    ALPRAZolam 0.25 MG Oral Tab Take 1 tablet (0.25 mg total) by mouth 2 (two) times daily as needed for Anxiety. 30 tablet 0      Past Medical History:    Abdominal distention    Abdominal pain    Anemia    Anxiety    Arthritis    Back pain    Benign paroxysmal positional vertigo due to bilateral vestibular disorder    Bloating    Calculus of kidney    Cancer (HCC)    thyroid CA 10 yrs ago    Constipation    Diarrhea, unspecified    Essential hypertension    Feeling lonely    Glaucoma    Heartburn    History of depression    Hypothyroidism    Indigestion    Irregular bowel habits    Kidney  stone    Loss of appetite    Mesenteric adenitis    Mild episode of recurrent major depressive disorder (HCC)    Normal delivery (HCC)    x3    Pain in joints    Personal history of malignant neoplasm of thyroid    PONV (postoperative nausea and vomiting)    Right leg pain    Stress    Thyroid disease    Visual impairment    glasses    Wears glasses    Weight gain      Past Surgical History:   Procedure Laterality Date    Cystoscopy,+ureteroscopy Left 5/4/17    L URS with BL RPG, passed stone, Dr. Mejia, EDW    Knee replacement surgery Left 4/18/16    Performed by Dr. Jayden Richmond @ [facility]    Knee replacement surgery Right 03/22/2019    Dr. Stuart at [facility]    Knee replacement surgery Right 2020    REVISION R TKA - Dr. Mitchell    [facility] localization wire 1 site left (cpt=19281)  AGE 25    CLOGGED DUCT    Other surgical history  1/1/2005    thyroid surgery XRT    Other surgical history  05/2019    R knee CONNIE    Total knee replacement Bilateral       Family History   Problem Relation Age of Onset    Heart Disease Mother     Heart Disease Father     Cancer Daughter         Daughter      Social History: mom had arthriits - 6 siblings,   Social History     Socioeconomic History    Marital status:    Tobacco Use    Smoking status: Never    Smokeless tobacco: Never   Vaping Use    Vaping status: Never Used   Substance and Sexual Activity    Alcohol use: Not Currently     Alcohol/week: 0.0 - 1.0 standard drinks of alcohol     Comment: occ    Drug use: No   Other Topics Concern    Caffeine Concern Yes     Comment: 1-2 cups of coffee daily    Stress Concern Yes    Weight Concern Yes    Special Diet No    Exercise No    Seat Belt Yes      3 children,   Not working -      REVIEW OF SYSTEMS:   Review Of Systems:  Fatigue  Constitutional:No fever, no change in weight or appetitie  Derm: No rashes, no oral ulcers, no alopecia, no photosensitivity, no psoriasis  HEENT: No dry eyes, no dry mouth, no  Raynaud's, no nasal ulcers, no parotid swelling, no neck pain, no jaw pain, no temple pain  Eyes: No visual changes,   CVS: No chest pain, no heart disease  RS:  SOB, Cough, No Pleurtic pain,   GI: No nausea, no vomiiting, no abominal pain, no hx of ulcer, no gastritis, intermitetnt.  heartburn, no dysphagia, no BRBPR or melena  : no dysuria, no hx of miscarriages, no DVT Hx, no hx of OCP,   Neuro: No numbness or tingling, no headache, no hx of seizures,   Psych: no hx of anxiety or depression  ENDO: no hx of thyroid disease, no hx of DM  Joint/Muscluskeltal: see HPI,   All other ROS are negative.     EXAM:   /68   Pulse 67   Resp 16   Ht 5' 4\" (1.626 m)   Wt 212 lb 1.6 oz (96.2 kg)   BMI 36.41 kg/m²   HEENT: Clear oropharynx, no oral ulcers, EOM intact, clear sclera, PERRLA, pleasant, no acute distress, no CAD,   No rashes  CVS: RRR, no murmurs  RS: CTAB, no crackles, no rhonchi  ABD: Soft Non tender, no HSM felt, BS positive  Joint exam:   no neck tendnerness, good ROM,   Kodak hand tender in 1-5th mcps and pips - , can close both hands   Left elbow tender   Upper .   Lower    And b/l si joint tender   B/l ihp bursitis tender   Tender in b/l ankels , no swelling   Squeeze test negative in feet  EXTREMITIES: no cyanosis, clubbing or edema  NEURO: intact touch, 5/5 ue and le strength    Component      Latest Ref Rng 4/8/2024 7/16/2024 9/23/2024   WBC      4.0 - 11.0 x10(3) uL 7.3      RBC      3.80 - 5.30 x10(6)uL 4.39      Hemoglobin      12.0 - 16.0 g/dL 13.1      Hematocrit      35.0 - 48.0 % 38.5      MCV      80.0 - 100.0 fL 87.7      MCH      26.0 - 34.0 pg 29.8      MCHC      31.0 - 37.0 g/dL 34.0      RDW-SD      35.1 - 46.3 fL 40.5      RDW      11.0 - 15.0 % 12.5      Platelet Count      150.0 - 450.0 10(3)uL 330.0      Prelim Neutrophil Abs      1.50 - 7.70 x10 (3) uL 3.73      Neutrophils Absolute      1.50 - 7.70 x10(3) uL 3.73      Lymphocytes Absolute      1.00 - 4.00  x10(3) uL 2.24      Monocytes Absolute      0.10 - 1.00 x10(3) uL 0.45      Eosinophils Absolute      0.00 - 0.70 x10(3) uL 0.84 (H)      Basophils Absolute      0.00 - 0.20 x10(3) uL 0.03      Immature Granulocyte Absolute      0.00 - 1.00 x10(3) uL 0.02      Neutrophils %      % 51.0      Lymphocytes %      % 30.6      Monocytes %      % 6.2      Eosinophils %      % 11.5      Basophils %      % 0.4      Immature Granulocyte %      % 0.3      Glucose      70 - 99 mg/dL 91   81    Sodium      136 - 145 mmol/L 140   136    Potassium      3.5 - 5.1 mmol/L 4.2   3.8    Chloride      98 - 112 mmol/L 106   107    Carbon Dioxide, Total      21.0 - 32.0 mmol/L 27.0   25.0    ANION GAP      0 - 18 mmol/L 7   4    BUN      9 - 23 mg/dL 12   14    CREATININE      0.55 - 1.02 mg/dL 0.70   0.67    BUN/CREATININE RATIO      10.0 - 20.0  17.1      CALCIUM      8.7 - 10.4 mg/dL 9.7   9.8    CALCULATED OSMOLALITY      275 - 295 mOsm/kg 289   282    EGFR      >=60 mL/min/1.73m2 94   95    ALT (SGPT)      10 - 49 U/L 23   23    AST (SGOT)      <34 U/L 20   24    ALKALINE PHOSPHATASE      55 - 142 U/L 69   68    Total Bilirubin      0.2 - 1.1 mg/dL 0.6   0.6    PROTEIN, TOTAL      5.7 - 8.2 g/dL 7.3   7.6    Albumin      3.2 - 4.8 g/dL 4.4   4.1    Globulin      2.0 - 3.5 g/dL 2.9   3.5    A/G Ratio      1.0 - 2.0  1.5   1.2    Patient Fasting for CMP? Yes   No    Cholesterol, Total      <200 mg/dL 147      HDL Cholesterol      40 - 59 mg/dL 48      Triglycerides      30 - 149 mg/dL 78      LDL Cholesterol Calc      <100 mg/dL 84      VLDL      0 - 30 mg/dL 12      NON-HDL CHOLESTEROL      <130 mg/dL 99      Patient Fasting for Lipid? Yes      HEMOGLOBIN A1c      <5.7 % 5.1      ESTIMATED AVERAGE GLUCOSE      68 - 126 mg/dL 100      ELVIN Titer/Pattern      <80  80 !      Reviewed By: Joao Garcia M.D.      T4,Free (Direct)      0.8 - 1.7 ng/dL  1.6  1.7    TSH      0.550 - 4.780 mIU/mL  0.827  0.738    ELVIN SCREEN      Negative   Positive !      VITAMIN D, 25-OH, TOTAL      30.0 - 100.0 ng/mL 17.5 (L)  15.0 (L)  22.0 (L)       Component      Latest Ref Rng 4/7/2014 1/20/2020 4/1/2021 3/1/2023   MOHSEN Titer/Pattern      <80        Reviewed By:       MOHSEN SCREEN      Negative  NEGATIVE       SED RATE      0 - 25 mm/Hr  23  24     C-REACTIVE PROTEIN      <0.30 mg/dL  <0.29  <0.29  0.69 (H)       Legend:  (H) High  5/3/2024 - right  and left hand xra y   1. There is mild diffuse osteoarthritis.   2. There is subtle periarticular erosion radial aspect of the 2nd distal interphalangeal joint.  This is not a typical location for joint erosion from rheumatoid arthritis although can be seen in the setting of psoriatic arthritis.  This is too small   finding for additional characterization.     5/3/2024 - shoulder xray , left     CONCLUSION:  Calcification near rotator cuff tendon insertion on humeral head most likely indicates calcific tendinopathy or calcific bursitis.     4/18/2024 - bilat hip xray     CONCLUSION:  No acute osseous findings.  Mild osteoarthritis.        12/20/2023 - lumbar xray   Slight lower lumbar levoscoliosis.  Mild anterolisthesis of L2 with respect L3 without significant change despite flexion and extension maneuvers.  No spondylolysis.  Lower lumbar facet joint arthropathy, particularly involves lumbar sacral left   facet.  Multilevel disc space narrowing with marginal osteophytes is most consistent with degenerative change.         ASSESSMENT AND PLAN:   Jacqui Martínez is a 69 year old female who presents for   Chief Complaint   Patient presents with    Consult     Dx: Positive MOHSEN (antinuclear antibody) (R76.8) Polyarthralgia (M25.50)       Polyarthralgia, myalgia , borderline positive mohsen 1:80 -   - Check labs to evaluate for inflammatory arthritis and/or connective tissue disease   - rtc in 4-6 weeks.     2. Chronic lower back pain   -- check bilat hip xray - and pelvic xray - eval   -   Summary:  Check labs   Check bilat  hip xray -   Return to clinic in 4-6 weeks.       Rubina Harris MD  10/22/2024  12:28 PM

## 2024-10-23 ENCOUNTER — OFFICE VISIT (OUTPATIENT)
Dept: INTERNAL MEDICINE CLINIC | Facility: CLINIC | Age: 69
End: 2024-10-23
Payer: MEDICARE

## 2024-10-23 VITALS
OXYGEN SATURATION: 96 % | HEART RATE: 78 BPM | SYSTOLIC BLOOD PRESSURE: 126 MMHG | RESPIRATION RATE: 15 BRPM | TEMPERATURE: 98 F | BODY MASS INDEX: 36 KG/M2 | WEIGHT: 211.81 LBS | DIASTOLIC BLOOD PRESSURE: 70 MMHG

## 2024-10-23 DIAGNOSIS — D32.9 MENINGIOMA (HCC): Chronic | ICD-10-CM

## 2024-10-23 DIAGNOSIS — E03.9 HYPOTHYROIDISM, UNSPECIFIED TYPE: Chronic | ICD-10-CM

## 2024-10-23 DIAGNOSIS — Z23 IMMUNIZATION DUE: ICD-10-CM

## 2024-10-23 DIAGNOSIS — R79.89 LOW VITAMIN D LEVEL: ICD-10-CM

## 2024-10-23 DIAGNOSIS — I10 HYPERTENSION, UNSPECIFIED TYPE: Primary | ICD-10-CM

## 2024-10-23 LAB
ALDOLASE: 5.5 U/L
CCP IGG SERPL-ACNC: 1.3 U/ML (ref 0–6.9)
DSDNA IGG SERPL IA-ACNC: 1.1 IU/ML
ENA AB SER QL IA: 0.1 UG/L
ENA AB SER QL IA: NEGATIVE
NUCLEAR IGG TITR SER IF: POSITIVE {TITER}

## 2024-10-23 PROCEDURE — G0008 ADMIN INFLUENZA VIRUS VAC: HCPCS | Performed by: INTERNAL MEDICINE

## 2024-10-23 PROCEDURE — 1159F MED LIST DOCD IN RCRD: CPT | Performed by: INTERNAL MEDICINE

## 2024-10-23 PROCEDURE — 3074F SYST BP LT 130 MM HG: CPT | Performed by: INTERNAL MEDICINE

## 2024-10-23 PROCEDURE — 3078F DIAST BP <80 MM HG: CPT | Performed by: INTERNAL MEDICINE

## 2024-10-23 PROCEDURE — 1160F RVW MEDS BY RX/DR IN RCRD: CPT | Performed by: INTERNAL MEDICINE

## 2024-10-23 PROCEDURE — 99214 OFFICE O/P EST MOD 30 MIN: CPT | Performed by: INTERNAL MEDICINE

## 2024-10-23 PROCEDURE — 90662 IIV NO PRSV INCREASED AG IM: CPT | Performed by: INTERNAL MEDICINE

## 2024-10-23 RX ORDER — HYDROCHLOROTHIAZIDE 12.5 MG/1
12.5 CAPSULE ORAL DAILY
Qty: 90 CAPSULE | Refills: 2 | Status: SHIPPED | OUTPATIENT
Start: 2024-10-23

## 2024-10-23 RX ORDER — LEVOTHYROXINE SODIUM 150 UG/1
150 TABLET ORAL DAILY
Qty: 90 TABLET | Refills: 3 | Status: SHIPPED | OUTPATIENT
Start: 2024-10-23

## 2024-10-23 RX ORDER — LOSARTAN POTASSIUM 100 MG/1
100 TABLET ORAL DAILY
Qty: 90 TABLET | Refills: 3 | Status: SHIPPED | OUTPATIENT
Start: 2024-10-23

## 2024-10-23 NOTE — PROGRESS NOTES
Subjective:   Jacqui Martínez is a 69 year old female  who presents for Follow - Up       HTN-per last appt amlodipine was changed to hydralazine but per cardiology notes from 10/7/24 pt has not been taking medications as prescribed. She was told to discontinue hydralazine and amlodipine and continue losartan and hydrochlorothiazide. She was asked to follow-up in 1 month.  Denies cp/sob/angina  BP controlled    Meningoma - she was scheduled for MRI but no showed to that appt. Needs to reschedule.   Denies tunnel vision or HA    Low vitamin d- supplements     Hypothyroidism- on levothyroxine and controlled     History/Other:    Chief Complaint Reviewed and Verified  No Further Nursing Notes to   Review  Tobacco Reviewed  Allergies Reviewed  Medications Reviewed    Problem List Reviewed  Medical History Reviewed  Surgical History   Reviewed  Family History Reviewed         Current Outpatient Medications   Medication Sig Dispense Refill    hydroCHLOROthiazide 12.5 MG Oral Cap Take 1 capsule (12.5 mg total) by mouth daily. 90 capsule 2    levothyroxine 150 MCG Oral Tab Take 1 tablet (150 mcg total) by mouth daily. 90 tablet 3    losartan 100 MG Oral Tab Take 1 tablet (100 mg total) by mouth daily. 90 tablet 3    calcitRIOL 0.5 MCG Oral Cap Take 1 capsule (0.5 mcg total) by mouth daily. 90 capsule 0    Blood Pressure Does not apply Kit Check blood pressure daily 1 kit 0    MELOXICAM 7.5 MG Oral Tab TAKE 1 TABLET(7.5 MG) BY MOUTH DAILY AS NEEDED FOR PAIN. DO NOT TAKE OTHER NSAIDS WITH THIS MEDICATION 30 tablet 0    famotidine 40 MG Oral Tab Take 1 tablet (40 mg total) by mouth daily. PATIENT NEEDS OFFICE VISIT FOR FURTHER REFILLS 60 tablet 1    pantoprazole 40 MG Oral Tab EC Take 1 tablet (40 mg total) by mouth every morning before breakfast. 90 tablet 3    ALPRAZolam 0.25 MG Oral Tab Take 1 tablet (0.25 mg total) by mouth 2 (two) times daily as needed for Anxiety. 30 tablet 0       Review of Systems:  Pertinent  items are noted in HPI.  A comprehensive 10 point review of systems was completed.  Pertinent positives and negatives noted in the the HPI.        Objective:   /70 (BP Location: Right arm, Patient Position: Sitting, Cuff Size: adult)   Pulse 78   Temp 98 °F (36.7 °C) (Temporal)   Resp 15   Wt 211 lb 12.8 oz (96.1 kg)   SpO2 96%   BMI 36.36 kg/m²  Estimated body mass index is 36.36 kg/m² as calculated from the following:    Height as of 10/22/24: 5' 4\" (1.626 m).    Weight as of this encounter: 211 lb 12.8 oz (96.1 kg).  PHYSICAL EXAM:   General: no acute distress   Respiratory: no increased work of breathing; good air exchange; CTAB; no crackles or wheezing   Cardiovascular: RRR; S1, S2; no murmurs; no edema   Neurological: awake, alert, oriented x3; CNII-XII grossly intact;  Behavioral/Psych: euthymic; appropriate affect       Assessment & Plan:   Jacqui was seen today for follow - up.    Diagnoses and all orders for this visit:    Hypertension, unspecified type  -     hydroCHLOROthiazide 12.5 MG Oral Cap; Take 1 capsule (12.5 mg total) by mouth daily.  -     losartan 100 MG Oral Tab; Take 1 tablet (100 mg total) by mouth daily.    Meningioma (HCC)  -schedule MRI     Hypothyroidism, unspecified type  -     levothyroxine 150 MCG Oral Tab; Take 1 tablet (150 mcg total) by mouth daily.    Low vitamin D level  -supplements     Immunization due  -     High Dose Fluzone trivalent influenza, 65 yrs+ PFS [[id number]]                Isabela Romano MD

## 2024-10-24 LAB
ACE: 35 U/L
ANA NUCLEOLAR TITR SER IF: 80 {TITER}

## 2024-11-21 ENCOUNTER — OFFICE VISIT (OUTPATIENT)
Dept: RHEUMATOLOGY | Facility: CLINIC | Age: 69
End: 2024-11-21

## 2024-11-21 VITALS
SYSTOLIC BLOOD PRESSURE: 118 MMHG | HEART RATE: 75 BPM | DIASTOLIC BLOOD PRESSURE: 60 MMHG | HEIGHT: 64 IN | WEIGHT: 212 LBS | BODY MASS INDEX: 36.19 KG/M2

## 2024-11-21 DIAGNOSIS — R70.0 ELEVATED SED RATE: ICD-10-CM

## 2024-11-21 DIAGNOSIS — M25.50 POLYARTHRALGIA: ICD-10-CM

## 2024-11-21 DIAGNOSIS — M15.0 PRIMARY OSTEOARTHRITIS INVOLVING MULTIPLE JOINTS: Chronic | ICD-10-CM

## 2024-11-21 DIAGNOSIS — R76.8 POSITIVE ANA (ANTINUCLEAR ANTIBODY): Primary | ICD-10-CM

## 2024-11-21 DIAGNOSIS — M79.604 PAIN OF RIGHT LOWER EXTREMITY: ICD-10-CM

## 2024-11-21 DIAGNOSIS — M25.551 PAIN OF RIGHT HIP: ICD-10-CM

## 2024-11-21 PROCEDURE — 3008F BODY MASS INDEX DOCD: CPT | Performed by: INTERNAL MEDICINE

## 2024-11-21 PROCEDURE — 99214 OFFICE O/P EST MOD 30 MIN: CPT | Performed by: INTERNAL MEDICINE

## 2024-11-21 PROCEDURE — 3078F DIAST BP <80 MM HG: CPT | Performed by: INTERNAL MEDICINE

## 2024-11-21 PROCEDURE — 1160F RVW MEDS BY RX/DR IN RCRD: CPT | Performed by: INTERNAL MEDICINE

## 2024-11-21 PROCEDURE — 1159F MED LIST DOCD IN RCRD: CPT | Performed by: INTERNAL MEDICINE

## 2024-11-21 PROCEDURE — 3074F SYST BP LT 130 MM HG: CPT | Performed by: INTERNAL MEDICINE

## 2024-11-21 PROCEDURE — G2211 COMPLEX E/M VISIT ADD ON: HCPCS | Performed by: INTERNAL MEDICINE

## 2024-11-21 PROCEDURE — 1126F AMNT PAIN NOTED NONE PRSNT: CPT | Performed by: INTERNAL MEDICINE

## 2024-11-21 RX ORDER — METHYLPREDNISOLONE 4 MG/1
TABLET ORAL
Qty: 1 EACH | Refills: 0 | Status: SHIPPED | OUTPATIENT
Start: 2024-11-21

## 2024-11-21 RX ORDER — MELOXICAM 7.5 MG/1
7.5 TABLET ORAL DAILY PRN
Qty: 90 TABLET | Refills: 1 | Status: SHIPPED | OUTPATIENT
Start: 2024-11-21

## 2024-11-21 NOTE — PROGRESS NOTES
Jacqui Martínez is a 69 year old female who presents for   Chief Complaint   Patient presents with    Joint Pain   .   HPI:     I had the pleasure of seeing Jacqui Martínez on 10/22/2024 for evaluation.     She is a pleasant 69 year old has polyarthralgia, and myalgia with postiive MOHSEN 1:80 . She was referred by DR. Angie Romano.   She had hx of bilater knee replacements , hx of thyroid cancer 20 years ago   She has hx fo hand pain and leg pain.   She feels the pain gradually has increased - over the last 2 years.   She didn't pay attention to it til recenlty. She gets swelling in base of wrist - harleen left wrist. Her ring size is increasdd.   She feels pain in her knuckles.   She used to do phsyical therapy in the past and doenst' like it.   She did it for her lower back, neck , knees and hands.   Back pain increases at night but is all the time.       She had labs in 4/2024 - mohsen 1:80 nucleoloar  She had covid this year - she didn't know she had it - she was screened b/janiya sister had it - no sympotms     She denes  Raynaud's -   her left thumb and fingers get dermatiits   She started using bay leafves for her raynaud's -s eh jsut started it. She has been gettnig skin has itchigin more.   She gets pachy dry skin over her back and over her left ear -   She stopped the bay leaves b/c of the itching. But she feels the pain swelling in her left hand is better.     She gets dry eyes - she has glaucoma,   Hx of chronic back pain - for several years -     Doesn't take pills for pain   She had taken melxoicam after her knee replacements and that helps so she uses this sometiems.   Last night she took it   She has about 5-6/10 pain.   She gets pain in upper back, lower back, hands and legs   Including her knees - she had right knee repalcement twice   Her feet doenst' hurts     Denies peripheral neuropathy  Has baseline sob and cough. Told it was not cardiac.     Hx of kidney stones.   No Hx of gout -   Hx of mesenteric  adenitis noted in the past - recent ct abd pelvis for kidney stone in 2023 - was negative for this.     11/21/2024  She had bad pain in her hands  but she took the melxoicam yesterday and it helped her pain today.   But it bothers her stomach.  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   It's her hands and her shoulders -   It's her legs with her right knee repalcement x 2 - and it's always swollen -   Sometiems her hips bother her - this was boethering her a lot - now it doesn's tbother her.     Her sed rates is 39mm/hr. -     She is supposed to get an MRI brain - b/c she was told she had a mild stroke - blamed when her son passed away and hilario mcgowan came to her house - she feels it must have been at that time - never knew she had a stroke - going to see a neurolgoist.   Her father passed and missed her appt.     Wt Readings from Last 2 Encounters:   11/21/24 212 lb (96.2 kg)   10/23/24 211 lb 12.8 oz (96.1 kg)     Body mass index is 36.39 kg/m².      Current Outpatient Medications   Medication Sig Dispense Refill    hydroCHLOROthiazide 12.5 MG Oral Cap Take 1 capsule (12.5 mg total) by mouth daily. 90 capsule 2    levothyroxine 150 MCG Oral Tab Take 1 tablet (150 mcg total) by mouth daily. 90 tablet 3    losartan 100 MG Oral Tab Take 1 tablet (100 mg total) by mouth daily. 90 tablet 3    calcitRIOL 0.5 MCG Oral Cap Take 1 capsule (0.5 mcg total) by mouth daily. 90 capsule 0    Blood Pressure Does not apply Kit Check blood pressure daily 1 kit 0    MELOXICAM 7.5 MG Oral Tab TAKE 1 TABLET(7.5 MG) BY MOUTH DAILY AS NEEDED FOR PAIN. DO NOT TAKE OTHER NSAIDS WITH THIS MEDICATION 30 tablet 0    famotidine 40 MG Oral Tab Take 1 tablet (40 mg total) by mouth daily. PATIENT NEEDS OFFICE VISIT FOR FURTHER REFILLS 60 tablet 1    pantoprazole 40 MG Oral Tab EC Take 1 tablet (40 mg total) by mouth every morning  before breakfast. 90 tablet 3    ALPRAZolam 0.25 MG Oral Tab Take 1 tablet (0.25 mg total) by mouth 2 (two) times daily as needed for Anxiety. 30 tablet 0      Past Medical History:    Abdominal distention    Abdominal pain    Anemia    Anxiety    Arthritis    Back pain    Benign paroxysmal positional vertigo due to bilateral vestibular disorder    Bloating    Calculus of kidney    Cancer (HCC)    thyroid CA 10 yrs ago    Constipation    Diarrhea, unspecified    Essential hypertension    Feeling lonely    Glaucoma    Heartburn    History of depression    Hypothyroidism    Indigestion    Irregular bowel habits    Kidney stone    Loss of appetite    Mesenteric adenitis    Mild episode of recurrent major depressive disorder (HCC)    Normal delivery (HCC)    x3    Pain in joints    Personal history of malignant neoplasm of thyroid    PONV (postoperative nausea and vomiting)    Right leg pain    Stress    Thyroid disease    Visual impairment    glasses    Wears glasses    Weight gain      Past Surgical History:   Procedure Laterality Date    Cystoscopy,+ureteroscopy Left 5/4/17    L URS with BL RPG, passed stone, Dr. Mejia, [facility]    Knee replacement surgery Left 4/18/16    Performed by Dr. Jayden Richmond @ [facility]    Knee replacement surgery Right 03/22/2019    Dr. Stuart at [facility]    Knee replacement surgery Right 2020    REVISION R TKA - Dr. Mitchell    [facility] localization wire 1 site left (cpt=[id number])  AGE 25    CLOGGED DUCT    Other surgical history  1/1/2005    thyroid surgery XRT    Other surgical history  05/2019    R knee CONNIE    Total knee replacement Bilateral       Family History   Problem Relation Age of Onset    Heart Disease Mother     Heart Disease Father     Cancer Daughter         Daughter      Social History: mom had arthriits - 6 siblings,   Social History     Socioeconomic History    Marital status:    Tobacco Use    Smoking status: Never    Smokeless tobacco: Never   Vaping Use     Vaping status: Never Used   Substance and Sexual Activity    Alcohol use: Not Currently     Alcohol/week: 0.0 - 1.0 standard drinks of alcohol     Comment: occ    Drug use: No   Other Topics Concern    Caffeine Concern Yes     Comment: 1-2 cups of coffee daily    Stress Concern Yes    Weight Concern Yes    Special Diet No    Exercise No    Seat Belt Yes      3 children,   Not working -      REVIEW OF SYSTEMS:   Review Of Systems:  Fatigue  Constitutional:No fever, no change in weight or appetitie  Derm: No rashes, no oral ulcers, no alopecia, no photosensitivity, no psoriasis  HEENT: No dry eyes, no dry mouth, no Raynaud's, no nasal ulcers, no parotid swelling, no neck pain, no jaw pain, no temple pain  Eyes: No visual changes,   CVS: No chest pain, no heart disease  RS:  SOB, Cough, No Pleurtic pain,   GI: No nausea, no vomiiting, no abominal pain, no hx of ulcer, no gastritis, intermitetnt.  heartburn, no dysphagia, no BRBPR or melena  : no dysuria, no hx of miscarriages, no DVT Hx, no hx of OCP,   Neuro: No numbness or tingling, no headache, no hx of seizures,   Psych: no hx of anxiety or depression  ENDO: no hx of thyroid disease, no hx of DM  Joint/Muscluskeltal: see HPI,   All other ROS are negative.     EXAM:   /60 (BP Location: Left arm, Patient Position: Sitting, Cuff Size: large)   Pulse 75   Ht 5' 4\" (1.626 m)   Wt 212 lb (96.2 kg)   BMI 36.39 kg/m²   HEENT: Clear oropharynx, no oral ulcers, EOM intact, clear sclera, PERRLA, pleasant, no acute distress, no CAD,   No rashes  CVS: RRR, no murmurs  RS: CTAB, no crackles, no rhonchi  ABD: Soft Non tender, no HSM felt, BS positive  Joint exam:   no neck tendnerness, good ROM,   Kodak hand tender in 1-5th mcps and pips - , can close both hands   Left elbow tender   Upper .   Lower    And b/l si joint tender   B/l ihp bursitis tender   Tender in b/l ankels , no swelling   Squeeze test negative in feet  EXTREMITIES: no cyanosis,  clubbing or edema  NEURO: intact touch, 5/5 ue and le strength    Component      Latest Ref Rng 4/8/2024 7/16/2024 9/23/2024   WBC      4.0 - 11.0 x10(3) uL 7.3      RBC      3.80 - 5.30 x10(6)uL 4.39      Hemoglobin      12.0 - 16.0 g/dL 13.1      Hematocrit      35.0 - 48.0 % 38.5      MCV      80.0 - 100.0 fL 87.7      MCH      26.0 - 34.0 pg 29.8      MCHC      31.0 - 37.0 g/dL 34.0      RDW-SD      35.1 - 46.3 fL 40.5      RDW      11.0 - 15.0 % 12.5      Platelet Count      150.0 - 450.0 10(3)uL 330.0      Prelim Neutrophil Abs      1.50 - 7.70 x10 (3) uL 3.73      Neutrophils Absolute      1.50 - 7.70 x10(3) uL 3.73      Lymphocytes Absolute      1.00 - 4.00 x10(3) uL 2.24      Monocytes Absolute      0.10 - 1.00 x10(3) uL 0.45      Eosinophils Absolute      0.00 - 0.70 x10(3) uL 0.84 (H)      Basophils Absolute      0.00 - 0.20 x10(3) uL 0.03      Immature Granulocyte Absolute      0.00 - 1.00 x10(3) uL 0.02      Neutrophils %      % 51.0      Lymphocytes %      % 30.6      Monocytes %      % 6.2      Eosinophils %      % 11.5      Basophils %      % 0.4      Immature Granulocyte %      % 0.3      Glucose      70 - 99 mg/dL 91   81    Sodium      136 - 145 mmol/L 140   136    Potassium      3.5 - 5.1 mmol/L 4.2   3.8    Chloride      98 - 112 mmol/L 106   107    Carbon Dioxide, Total      21.0 - 32.0 mmol/L 27.0   25.0    ANION GAP      0 - 18 mmol/L 7   4    BUN      9 - 23 mg/dL 12   14    CREATININE      0.55 - 1.02 mg/dL 0.70   0.67    BUN/CREATININE RATIO      10.0 - 20.0  17.1      CALCIUM      8.7 - 10.4 mg/dL 9.7   9.8    CALCULATED OSMOLALITY      275 - 295 mOsm/kg 289   282    EGFR      >=60 mL/min/1.73m2 94   95    ALT (SGPT)      10 - 49 U/L 23   23    AST (SGOT)      <34 U/L 20   24    ALKALINE PHOSPHATASE      55 - 142 U/L 69   68    Total Bilirubin      0.2 - 1.1 mg/dL 0.6   0.6    PROTEIN, TOTAL      5.7 - 8.2 g/dL 7.3   7.6    Albumin      3.2 - 4.8 g/dL 4.4   4.1    Globulin      2.0 - 3.5  g/dL 2.9   3.5    A/G Ratio      1.0 - 2.0  1.5   1.2    Patient Fasting for CMP? Yes   No    Cholesterol, Total      <200 mg/dL 147      HDL Cholesterol      40 - 59 mg/dL 48      Triglycerides      30 - 149 mg/dL 78      LDL Cholesterol Calc      <100 mg/dL 84      VLDL      0 - 30 mg/dL 12      NON-HDL CHOLESTEROL      <130 mg/dL 99      Patient Fasting for Lipid? Yes      HEMOGLOBIN A1c      <5.7 % 5.1      ESTIMATED AVERAGE GLUCOSE      68 - 126 mg/dL 100      ELVIN Titer/Pattern      <80  80 !      Reviewed By: Joao Garcia M.D.      T4,Free (Direct)      0.8 - 1.7 ng/dL  1.6  1.7    TSH      0.550 - 4.780 mIU/mL  0.827  0.738    ELVIN SCREEN      Negative  Positive !      VITAMIN D, 25-OH, TOTAL      30.0 - 100.0 ng/mL 17.5 (L)  15.0 (L)  22.0 (L)       Component      Latest Ref Rng 4/7/2014 1/20/2020 4/1/2021 3/1/2023   ELVIN Titer/Pattern      <80        Reviewed By:       ELVIN SCREEN      Negative  NEGATIVE       SED RATE      0 - 25 mm/Hr  23  24     C-REACTIVE PROTEIN      <0.30 mg/dL  <0.29  <0.29  0.69 (H)         Component      Latest Ref [facility] 10/22/2024   WBC      4.0 - 11.0 x10(3) uL 8.7    RBC      3.80 - 5.30 x10(6)uL 4.65    Hemoglobin      12.0 - 16.0 g/dL 13.3    Hematocrit      35.0 - 48.0 % 39.9    MCV      80.0 - 100.0 fL 85.8    MCH      26.0 - 34.0 pg 28.6    MCHC      31.0 - 37.0 g/dL 33.3    RDW      11.0 - 15.0 % 12.7    RDW-SD      35.1 - 46.3 fL 39.7    Platelet Count      150.0 - 450.0 10(3)uL 335.0    Expanded RICHELLE Antibody Screen, IGG      <0.7 ug/l 0.10    Anti-dsDNA antibody      <10 IU/mL 1.1    Connective Tissue Disease Screen Interpretation      Negative  Negative    ELVIN Titer/Pattern      <80  80 !    Reviewed By: Ayleen Brackney, M.D.    ACE      14 - 82 U/L 35    Aldolase      3.3 - 10.3 U/L 5.5    CK       U/L U/L 63    URIC ACID      3.1 - 7.8 mg/dL 5.1    C-REACTIVE PROTEIN      <1.00 mg/dL <0.40    SED RATE      0 - 30 mm/Hr 39 (H)    ELVIN SCREEN      Negative  Positive  !    C-Citrullinated Peptide IgG AB      0.0 - 6.9 U/mL 1.3    RHEUMATOID FACTOR      <14.0 IU/mL <3.5       Legend:  ! Abnormal  (H) High  5/3/2024 - right  and left hand xra y   1. There is mild diffuse osteoarthritis.   2. There is subtle periarticular erosion radial aspect of the 2nd distal interphalangeal joint.  This is not a typical location for joint erosion from rheumatoid arthritis although can be seen in the setting of psoriatic arthritis.  This is too small   finding for additional characterization.     5/3/2024 - shoulder xray , left     CONCLUSION:  Calcification near rotator cuff tendon insertion on humeral head most likely indicates calcific tendinopathy or calcific bursitis.     4/18/2024 - bilat hip xray     CONCLUSION:  No acute osseous findings.  Mild osteoarthritis.        12/20/2023 - lumbar xray   Slight lower lumbar levoscoliosis.  Mild anterolisthesis of L2 with respect L3 without significant change despite flexion and extension maneuvers.  No spondylolysis.  Lower lumbar facet joint arthropathy, particularly involves lumbar sacral left   facet.  Multilevel disc space narrowing with marginal osteophytes is most consistent with degenerative change.       10/22/2024 - bilat hip xray     Mild bilateral hip osteoarthritis without acute fracture or dislocation.       ASSESSMENT AND PLAN:   Jacqui Martínez is a 69 year old female who presents for   Chief Complaint   Patient presents with    Joint Pain     Polyarthralgia, myalgia , borderline positive mohsen 1:80 -   And sed rate sis elevated at 39mm/hr.   - other labs negative for inflammatory arthritis and/or connective tissue disease - no SLE  Seems like she has osteoarthritsi more than inflammatory arthrit s  - her hips are better - not likely PMR   Will repeat sed rate in 3months.   Has thumb pain and back pain   Melxoicam prn  - rtc in 34  months.     2. Chronic lower back pain   -- mildosteoarthrits  bilat hip xray - and pelvic xray - - no  sacroillitis   - melxoicam pprn   -   Summary:  Check labs in 3months   Cont. Meloxicam 7.5mg every other day - as needed - to daily   If back pain gets severe - can take medrol jay   Return to clinic in 4months.     Rubina Harris MD  11/21/2024   12:59 PM     is applicable because the patient's medical record notes reflects the ongoing nature of the continuous longitudinal relationship of care, and the medical record indicates that there is ongoing treatment of a serious/complex medical condition.

## 2024-11-21 NOTE — PATIENT INSTRUCTIONS
Check labs in 3months   Cont. Meloxicam 7.5mg every other day - as needed - to daily   If back pain gets severe - can take medrol jay   Return to clinic in 4months.

## 2024-12-19 ENCOUNTER — HOSPITAL ENCOUNTER (OUTPATIENT)
Dept: MAMMOGRAPHY | Age: 69
Discharge: HOME | End: 2024-12-19
Attending: SURGERY
Payer: MEDICARE

## 2024-12-19 DIAGNOSIS — Z12.31 ENCOUNTER FOR SCREENING MAMMOGRAM FOR MALIGNANT NEOPLASM OF BREAST: ICD-10-CM

## 2024-12-19 PROCEDURE — 77063 BREAST TOMOSYNTHESIS BI: CPT | Performed by: SURGERY

## 2024-12-19 PROCEDURE — 77067 SCR MAMMO BI INCL CAD: CPT | Performed by: SURGERY

## 2024-12-21 DIAGNOSIS — I10 HYPERTENSION, UNSPECIFIED TYPE: ICD-10-CM

## 2024-12-23 RX ORDER — HYDRALAZINE HYDROCHLORIDE 50 MG/1
50 TABLET, FILM COATED ORAL 2 TIMES DAILY
Qty: 180 TABLET | Refills: 0 | OUTPATIENT
Start: 2024-12-23

## 2024-12-23 NOTE — TELEPHONE ENCOUNTER
Name from pharmacy: HYDRALAZINE  50MG TABLETS         Will file in chart as: HYDRALAZINE 50 MG Oral Tab    The original prescription was discontinued on 10/23/2024 by Isabela Allen MD for the following reason: Discontinued by another Health Care Provider. Renewing this prescription may not be appropriate.    Sig: TAKE 1 TABLET BY MOUTH EVERY MORNING AND 1 TABLET EVERY NIGHT AT BEDTIME    Disp: 180 tablet    Refills: 0 (Pharmacy requested: Not specified)    Start: 12/21/2024    Class: Normal    Non-formulary For: Hypertension, unspecified type    Last ordered: 3 months ago (9/23/2024) by Isabela Romano MD    Last refill: 9/23/2024    Rx #: [id number]    Hypertension Medications Protocol [id number]/21/2024 12:06 PM   Protocol Details CMP or BMP in past 12 months    Last BP reading less than 140/90    In person appointment or virtual visit in the past 12 mos or appointment in next 3 mos    EGFRCR or GFRNAA > 50      To be filled at: [facility] #[id number] - [place], [place] - [address] AT [facility] [address], [phone], [phone]     LOV:10/23/2024  RTC:n/a   Labs:09/23/2024  Last filled:09/23/2024  Future Appointments   Date Time Provider Department Center   3/20/2025 11:20 AM Palaniswami, Purani, MD [facility] [place]

## 2025-01-03 ENCOUNTER — TELEPHONE (OUTPATIENT)
Dept: INTERNAL MEDICINE CLINIC | Facility: CLINIC | Age: 70
End: 2025-01-03

## 2025-01-13 ENCOUNTER — TELEPHONE (OUTPATIENT)
Dept: INTERNAL MEDICINE CLINIC | Facility: CLINIC | Age: 70
End: 2025-01-13

## 2025-01-13 DIAGNOSIS — R79.89 LOW VITAMIN D LEVEL: ICD-10-CM

## 2025-01-13 DIAGNOSIS — E03.9 HYPOTHYROIDISM, UNSPECIFIED TYPE: Primary | Chronic | ICD-10-CM

## 2025-01-13 DIAGNOSIS — Z98.890 HISTORY OF PARATHYROID SURGERY: ICD-10-CM

## 2025-01-13 NOTE — TELEPHONE ENCOUNTER
Pt calling with Urgent referral request.     Referral Request     1. Insurance Verification  -Does the patient have an HMO insurance plan that requires a referral? Yes   *Ensure insurance is updated and verified in [facility].  *PPO plans usually do not require an authorized referral. Advise the patient to call their insurance for a list of in-network providers.    2. Referral Process  -If a referral is needed:  -Is there already a valid authorized or pending referral in [facility]? No,  If yes, notify the patient of the status and/or fax per patient request.  -Provider, specialty, and contact information (office phone and fax number): Dr. Geno [facility]. [phone]  -Has the patient contacted their insurance plan to verify the specialist is in-network? N/a   -Has the patient seen this specialist in the past? Yes   -Does the patient have a scheduled appointment with the referral provider? Yes 1/15 at 11 If so, provide the date. If within 1 week, send TE as high priority.  -Diagnosis or reason for visit: Thyroid   -Does the referral need to be sent via fax? N/a  Fax number: n/a     3. Patient Notification  -Notify the patient that referrals can take up to 1 week or sometimes longer for authorization.  -Patient call back number: [phone]

## 2025-01-14 NOTE — TELEPHONE ENCOUNTER
Pt came in to check on ref status since she has an appt tomorrow. Pt informed that the ref needs DV signature but once sent to insurance it will still take time to process.    Pt will reschedule.

## 2025-01-22 DIAGNOSIS — F41.1 GAD (GENERALIZED ANXIETY DISORDER): ICD-10-CM

## 2025-01-22 RX ORDER — ALPRAZOLAM 0.25 MG/1
0.25 TABLET ORAL 2 TIMES DAILY PRN
Qty: 30 TABLET | Refills: 0 | Status: SHIPPED | OUTPATIENT
Start: 2025-01-22

## 2025-01-22 NOTE — TELEPHONE ENCOUNTER
Requesting    ALPRAZolam 0.25 MG Oral Tab         Sig: Take 1 tablet (0.25 mg total) by mouth 2 (two) times daily as needed for Anxiety.    Disp: 30 tablet    Refills: 0    Start: 1/22/2025    Class: Normal    Non-formulary For: JUANCHO (generalized anxiety disorder)    Last ordered: 1 year ago (9/14/2023) by Isabela Romano MD    Patient comment: Anxiety    Controlled Substance Medication [id number]/22/2025 02:30 PM    This medication is a controlled substance - forward to provider to refill    Medication is active on med list      To be filled at: [id number] [place] [place] - [address] AT [facility] [address], [phone], [phone]     LOV: 10/23/24 w/ [id number]  RTC: No Follow Up on File   Last Relevant Labs: 10/2024    Future Appointments   Date Time Provider Department Center   1/31/2025  4:00 PM  MR RM2 (1.5T WIDE)  [facility]   3/20/2025 11:20 AM Palaniswami, Purani, MD [facility] [place]

## 2025-01-31 ENCOUNTER — HOSPITAL ENCOUNTER (OUTPATIENT)
Dept: MRI IMAGING | Facility: HOSPITAL | Age: 70
Discharge: HOME | End: 2025-01-31
Attending: INTERNAL MEDICINE
Payer: MEDICARE

## 2025-01-31 DIAGNOSIS — D32.9 MENINGIOMA (HCC): Chronic | ICD-10-CM

## 2025-01-31 PROCEDURE — A9575 INJ GADOTERATE MEGLUMI 0.1ML: HCPCS | Performed by: INTERNAL MEDICINE

## 2025-01-31 PROCEDURE — 70553 MRI BRAIN STEM W/O & W/DYE: CPT | Performed by: INTERNAL MEDICINE

## 2025-01-31 RX ORDER — GADOTERATE MEGLUMINE 376.9 MG/ML
30 INJECTION INTRAVENOUS
Status: COMPLETED | OUTPATIENT
Start: 2025-01-31 | End: 2025-01-31

## 2025-01-31 RX ADMIN — GADOTERATE MEGLUMINE 28 ML: 376.9 INJECTION INTRAVENOUS at 16:37:00

## 2025-02-04 ENCOUNTER — TELEPHONE (OUTPATIENT)
Dept: INTERNAL MEDICINE CLINIC | Facility: CLINIC | Age: 70
End: 2025-02-04

## 2025-02-04 NOTE — TELEPHONE ENCOUNTER
Linwood from Dr. Naqvi's office called to inquire on why they still haven't received the appropriate referral for the pt. Says pt has had to be rescheduled multiple times as what we are sending over is just a list of her meds and not the actual referral itself.    I looked on the pt's chart found the referral and see that it's authorized therefore, while Linwood was on hold I sent the info over and waited for a confirmation. Confirmation was received. I verified if she had received and said there were faxes coming through but not the one I had sent over yet.    Office # [phone]  F: [phone]

## 2025-02-17 ENCOUNTER — OFFICE VISIT (OUTPATIENT)
Dept: INTERNAL MEDICINE CLINIC | Facility: CLINIC | Age: 70
End: 2025-02-17
Payer: MEDICARE

## 2025-02-17 VITALS
DIASTOLIC BLOOD PRESSURE: 74 MMHG | BODY MASS INDEX: 36.19 KG/M2 | HEIGHT: 64 IN | HEART RATE: 75 BPM | OXYGEN SATURATION: 98 % | SYSTOLIC BLOOD PRESSURE: 122 MMHG | WEIGHT: 212 LBS | RESPIRATION RATE: 16 BRPM | TEMPERATURE: 98 F

## 2025-02-17 DIAGNOSIS — G89.29 CHRONIC LEFT SHOULDER PAIN: ICD-10-CM

## 2025-02-17 DIAGNOSIS — M12.812 ROTATOR CUFF ARTHROPATHY OF LEFT SHOULDER: ICD-10-CM

## 2025-02-17 DIAGNOSIS — M25.512 CHRONIC LEFT SHOULDER PAIN: ICD-10-CM

## 2025-02-17 DIAGNOSIS — I10 HYPERTENSION, UNSPECIFIED TYPE: ICD-10-CM

## 2025-02-17 DIAGNOSIS — M16.11 OSTEOARTHRITIS OF RIGHT HIP, UNSPECIFIED OSTEOARTHRITIS TYPE: ICD-10-CM

## 2025-02-17 DIAGNOSIS — M25.551 PAIN OF RIGHT HIP: Primary | ICD-10-CM

## 2025-02-17 RX ORDER — METHYLPREDNISOLONE 4 MG/1
TABLET ORAL
Qty: 1 EACH | Refills: 0 | Status: SHIPPED | OUTPATIENT
Start: 2025-02-17

## 2025-02-17 NOTE — PROGRESS NOTES
Subjective:   Jacqui Martínez is a 69 year old female  who presents for Follow - Up     Reports R hip pain- worse with certain movements.   L shoulder pain.   Stats that meloxicam not helping anymore    History/Other:    Chief Complaint Reviewed and Verified  No Further Nursing Notes to   Review  Tobacco Reviewed  Allergies Reviewed  Medications Reviewed    Medical History Reviewed  Surgical History Reviewed  OB Status Reviewed    Family History Reviewed  Social History Reviewed         Current Outpatient Medications   Medication Sig Dispense Refill    ALPRAZolam 0.25 MG Oral Tab Take 1 tablet (0.25 mg total) by mouth 2 (two) times daily as needed for Anxiety. 30 tablet 0    Meloxicam 7.5 MG Oral Tab Take 1 tablet (7.5 mg total) by mouth daily as needed for Pain. 90 tablet 1    hydroCHLOROthiazide 12.5 MG Oral Cap Take 1 capsule (12.5 mg total) by mouth daily. 90 capsule 2    levothyroxine 150 MCG Oral Tab Take 1 tablet (150 mcg total) by mouth daily. 90 tablet 3    losartan 100 MG Oral Tab Take 1 tablet (100 mg total) by mouth daily. 90 tablet 3    calcitRIOL 0.5 MCG Oral Cap Take 1 capsule (0.5 mcg total) by mouth daily. 90 capsule 0    Blood Pressure Does not apply Kit Check blood pressure daily 1 kit 0    pantoprazole 40 MG Oral Tab EC Take 1 tablet (40 mg total) by mouth every morning before breakfast. 90 tablet 3       Review of Systems:  Pertinent items are noted in HPI.  A comprehensive 10 point review of systems was completed.  Pertinent positives and negatives noted in the the HPI.        Objective:   /74 (BP Location: Right arm, Patient Position: Sitting, Cuff Size: adult)   Pulse 75   Temp 98 °F (36.7 °C) (Temporal)   Resp 16   Ht 5' 4\" (1.626 m)   Wt 212 lb (96.2 kg)   SpO2 98%   BMI 36.39 kg/m²  Estimated body mass index is 36.39 kg/m² as calculated from the following:    Height as of this encounter: 5' 4\" (1.626 m).    Weight as of this encounter: 212 lb (96.2 kg).  PHYSICAL  EXAM:   General: no acute distress   Respiratory: no increased work of breathing; Neurological: awake, alert, oriented x3; CNII-XII grossly intact;  MSK: pain with rotation of R hip.   Stiffness and pain in L shoulder with certain movements  Behavioral/Psych: euthymic; appropriate affect       Assessment & Plan:   Jacqui was seen today for follow - up.    Diagnoses and all orders for this visit:    Pain of right hip  Osteoarthritis of right hip, unspecified osteoarthritis type  -     Ortho Referral - In Network     methylPREDNISolone (MEDROL) 4 MG Oral Tablet Therapy Pack; As directed.    Chronic left shoulder pain  Rotator cuff arthropathy of left shoulder  -     Ortho Referral - In Network    HTN-controlled. cpm                  Isabela Romano MD

## 2025-02-18 ENCOUNTER — TELEPHONE (OUTPATIENT)
Age: 70
End: 2025-02-18

## 2025-02-18 ENCOUNTER — OFFICE VISIT (OUTPATIENT)
Dept: SURGERY | Facility: CLINIC | Age: 70
End: 2025-02-18
Payer: MEDICARE

## 2025-02-18 VITALS — OXYGEN SATURATION: 96 % | HEART RATE: 85 BPM | SYSTOLIC BLOOD PRESSURE: 116 MMHG | DIASTOLIC BLOOD PRESSURE: 78 MMHG

## 2025-02-18 DIAGNOSIS — D32.9 MENINGIOMA (HCC): Primary | Chronic | ICD-10-CM

## 2025-02-18 PROBLEM — R06.09 CHRONIC DYSPNEA: Status: ACTIVE | Noted: 2024-04-25

## 2025-02-18 PROCEDURE — 1159F MED LIST DOCD IN RCRD: CPT | Performed by: NEUROLOGICAL SURGERY

## 2025-02-18 PROCEDURE — 99203 OFFICE O/P NEW LOW 30 MIN: CPT | Performed by: NEUROLOGICAL SURGERY

## 2025-02-18 PROCEDURE — 3074F SYST BP LT 130 MM HG: CPT | Performed by: NEUROLOGICAL SURGERY

## 2025-02-18 PROCEDURE — 1160F RVW MEDS BY RX/DR IN RCRD: CPT | Performed by: NEUROLOGICAL SURGERY

## 2025-02-18 PROCEDURE — 3078F DIAST BP <80 MM HG: CPT | Performed by: NEUROLOGICAL SURGERY

## 2025-02-18 NOTE — PROGRESS NOTES
The following individual(s) verbally consented to be recorded using ambient AI listening technology and understand that they can each withdraw their consent to this listening technology at any point by asking the clinician to turn off or pause the recording:            Patient name: Jacqui Martínez  Additional names:  Clarence Knight

## 2025-02-18 NOTE — PATIENT INSTRUCTIONS
Refill policies:    Allow 2-3 business days for refills; controlled substances may take longer.  Contact your pharmacy at least 5 days prior to running out of medication and have them send an electronic request or submit request through the “request refill” option in your [employer] account.  Refills are not addressed on weekends; covering physicians do not authorize routine medications on weekends.  No narcotics or controlled substances are refilled after noon on Fridays or by on call physicians.  By law, narcotics must be electronically prescribed.  A 30 day supply with no refills is the maximum allowed.  If your prescription is due for a refill, you may be due for a follow up appointment.  To best provide you care, patients receiving routine medications need to be seen at least once a year.  Patients receiving narcotic/controlled substance medications need to be seen at least once every 3 months.  In the event that your preferred pharmacy does not have the requested medication in stock (e.g. Backordered), it is your responsibility to find another pharmacy that has the requested medication available.  We will gladly send a new prescription to that pharmacy at your request.    Scheduling Tests:    If your physician has ordered radiology tests such as MRI or CT scans, please contact Central Scheduling at [phone] right away to schedule the test.  Once scheduled, the [facility] Centralized Referral Team will work with your insurance carrier to obtain pre-certification or prior authorization.  Depending on your insurance carrier, approval may take 3-10 days.  It is highly recommended patients assure they have received an authorization before having a test performed.  If test is done without insurance authorization, patient may be responsible for the entire amount billed.      Precertification and Prior Authorizations:  If your physician has recommended that you have a procedure or additional testing performed the [facility]  Centralized Referral Team will contact your insurance carrier to obtain pre-certification or prior authorization.    You are strongly encouraged to contact your insurance carrier to verify that your procedure/test has been approved and is a COVERED benefit.  Although the [facility] Centralized Referral Team does its due diligence, the insurance carrier gives the disclaimer that \"Although the procedure is authorized, this does not guarantee payment.\"    Ultimately the patient is responsible for payment.   Thank you for your understanding in this matter.  Paperwork Completion:  If you require FMLA or disability paperwork for your recovery, please make sure to either drop it off or have it faxed to our office at [phone]. Be sure the form has your name and date of birth on it.  The form will be faxed to our Forms Department and they will complete it for you.  There is a 25$ fee for all forms that need to be filled out.  Please be aware there is a 10-14 day turnaround time.  You will need to sign a release of information (KALE) form if your paperwork does not come with one.  You may call the Forms Department with any questions at [phone].  Their fax number is [phone].

## 2025-02-19 ENCOUNTER — TELEPHONE (OUTPATIENT)
Age: 70
End: 2025-02-19

## 2025-02-24 NOTE — PROGRESS NOTES
Nursing Consultation Note  Patient: Jacqui Martínez  YOB: 1955  Age: 69 year old  Radiation Oncologist: Dr. Seun Neumann  Referring Physician: Vinnie Mclaughlin  Diagnosis:[unfilled]  Consult Date: 2/24/2025      Chemotherapy: n/a  Labs: Reviewed  Imaging: Reviewed  Is the patient of child-bearing age?         No  Has the patient received radiation therapy in the past? yes thyroid 2005- pills  Does the patient have an implantable device?No   Patient has/has had:     1. Assistive Devices: N/A      Vital Signs:   Vitals:    02/25/25 1350   BP: 154/90   Pulse: 72   Resp: 18   Temp: 98.4 °F (36.9 °C)   ,   Wt Readings from Last 6 Encounters:   02/25/25 94.8 kg (209 lb)   02/17/25 96.2 kg (212 lb)   11/21/24 96.2 kg (212 lb)   10/23/24 96.1 kg (211 lb 12.8 oz)   10/22/24 96.2 kg (212 lb 1.6 oz)   09/23/24 96.6 kg (213 lb)       Nursing Note: Hx of asymptomatic R parieto-occipital meningioma. MRI brain 1/31/25 showed growth- 9x7x8 mm, previously measured [id number]. Discussed in tumor board. Recommended RT. Here for consult today with her daughter. Declines language line. Pt reports ringing in her ears that started 2/17/25. Ringing is in both ears. Has had a headache since 2/23/25- reports headache 6/10. Has not taken any medicine for headache. Denies blurred vision. Denies issues with memory.       Review of Systems   Constitutional:  Positive for appetite change and fatigue.   HENT:  Positive for tinnitus.    Eyes: Negative.    Respiratory:  Positive for shortness of breath.         SOB with stairs   Cardiovascular:  Positive for leg swelling.   Gastrointestinal: Negative.    Endocrine: Negative.    Genitourinary: Negative.    Musculoskeletal:  Positive for arthralgias and back pain.   Skin: Negative.    Allergic/Immunologic: Negative.    Neurological:  Positive for dizziness and headaches.   Hematological: Negative.    Psychiatric/Behavioral: Negative.           Allergies:  Allergies[1]    Current Outpatient  Medications   Medication Sig Dispense Refill    ALPRAZolam 0.25 MG Oral Tab Take 1 tablet (0.25 mg total) by mouth 2 (two) times daily as needed for Anxiety. 30 tablet 0    Meloxicam 7.5 MG Oral Tab Take 1 tablet (7.5 mg total) by mouth daily as needed for Pain. 90 tablet 1    hydroCHLOROthiazide 12.5 MG Oral Cap Take 1 capsule (12.5 mg total) by mouth daily. 90 capsule 2    levothyroxine 150 MCG Oral Tab Take 1 tablet (150 mcg total) by mouth daily. 90 tablet 3    losartan 100 MG Oral Tab Take 1 tablet (100 mg total) by mouth daily. 90 tablet 3    calcitRIOL 0.5 MCG Oral Cap Take 1 capsule (0.5 mcg total) by mouth daily. 90 capsule 0    Blood Pressure Does not apply Kit Check blood pressure daily 1 kit 0    pantoprazole 40 MG Oral Tab EC Take 1 tablet (40 mg total) by mouth every morning before breakfast. 90 tablet 3    methylPREDNISolone (MEDROL) 4 MG Oral Tablet Therapy Pack As directed. (Patient not taking: Reported on 2/25/2025) 1 each 0       Preferred Pharmacy:    [id number] [place], [place] - [facility] [address] AT [facility] [address], [phone], [phone]  [address]  [facility] [address]  Phone: [phone] Fax: [phone]      Past Medical History:    Abdominal distention    Abdominal pain    Anemia    Anxiety    Arthritis    Back pain    Benign paroxysmal positional vertigo due to bilateral vestibular disorder    Bloating    Calculus of kidney    Cancer (HCC)    thyroid CA 10 yrs ago    Constipation    Depression    Diarrhea, unspecified    Essential hypertension    Feeling lonely    Glaucoma    Heartburn    History of depression    Hypothyroidism    Indigestion    Irregular bowel habits    Kidney stone    Loss of appetite    Mesenteric adenitis    Mild episode of recurrent major depressive disorder    Normal delivery (HCC)    x3    Pain in joints    Personal history of malignant neoplasm of thyroid    PONV (postoperative nausea and vomiting)    Right leg pain     Stress    Thyroid disease    Visual impairment    glasses    Wears glasses    Weight gain       Past Surgical History:   Procedure Laterality Date    Colonoscopy  2 yeas    Colpectomy  Same    Cystoscopy,+ureteroscopy Left 2017    L URS with BL RPG, passed stone, Dr. Mejia, EDW    Knee replacement surgery Left 2016    Performed by Dr. Jayden Richmond @ [facility]    Knee replacement surgery Right 2019    Dr. Stuart at [facility]    Knee replacement surgery Right 2020    REVISION R TKA - Dr. Mitchell    [facility] localization wire 1 site left (cpt=19281)  AGE 25    CLOGGED DUCT      47    Other surgical history  2005    thyroid surgery XRT    Other surgical history  2019    R knee CONNIE    Total knee replacement Bilateral        Social History     Socioeconomic History    Marital status:      Spouse name: Not on file    Number of children: 3    Years of education: Not on file    Highest education level: Not on file   Occupational History    Not on file   Tobacco Use    Smoking status: Former     Types: Cigarettes    Smokeless tobacco: Never    Tobacco comments:     Smoked cigarettes x 1 year      \"Quit more than 20 years ago\"   Vaping Use    Vaping status: Never Used   Substance and Sexual Activity    Alcohol use: Not Currently     Alcohol/week: 0.0 - 1.0 standard drinks of alcohol     Comment: occ    Drug use: No    Sexual activity: Not on file   Other Topics Concern     Service Not Asked    Blood Transfusions Not Asked    Caffeine Concern Yes    Occupational Exposure Not Asked    Hobby Hazards Not Asked    Sleep Concern Not Asked    Stress Concern Yes    Weight Concern Yes    Special Diet No    Back Care Not Asked    Exercise Yes    Bike Helmet Not Asked    Seat Belt Yes    Self-Exams Not Asked   Social History Narrative        Remarried- lives with     3 kids    8 grandkids    5 great grandkids     Social Drivers of Health     Food Insecurity: Not on  file   Transportation Needs: Not on file   Housing Stability: Not on file       ECOG:  Grade 0 - Fully active, able to carry on all predisease activities without restrictions.      Education:  Knowledge Deficit Plan Of Care:    Problem:  Knowledge Deficit    Problems related to:    Radiation therapy    Interventions:  Assess patient knowledge level  Assess knowledge needs  Instruct on treatment planning  Instruct on radiation therapy appointment scheduling  Instruct on purpose of radiation therapy  Instruct on side effects of radiation therapy    Expected Outcomes:  Knowledge of radiation therapy    Progress Toward Outcome:  Making progress    Pamphlets/Handouts Given to Patient:  Understanding radiation therapy      Are ADL's met?  Yes  Does patient feel safe in their environment?  Yes  Care decisions:  Patient and/or surrogate IS involved in care decisions.  Advanced directives:  Patient DOES NOT have advanced directives.  Transportation:  Adequate transportation available for expected visits    Pain: 5/10 headache; ringing in her ears bothering her         [1] No Known Allergies

## 2025-02-25 ENCOUNTER — HOSPITAL ENCOUNTER (OUTPATIENT)
Dept: RADIATION ONCOLOGY | Facility: HOSPITAL | Age: 70
Discharge: HOME | End: 2025-02-25
Attending: RADIOLOGY
Payer: MEDICARE

## 2025-02-25 ENCOUNTER — TELEPHONE (OUTPATIENT)
Dept: INTERNAL MEDICINE CLINIC | Facility: CLINIC | Age: 70
End: 2025-02-25

## 2025-02-25 VITALS
WEIGHT: 209 LBS | BODY MASS INDEX: 36 KG/M2 | TEMPERATURE: 98 F | OXYGEN SATURATION: 97 % | SYSTOLIC BLOOD PRESSURE: 154 MMHG | HEART RATE: 72 BPM | RESPIRATION RATE: 18 BRPM | DIASTOLIC BLOOD PRESSURE: 90 MMHG

## 2025-02-25 DIAGNOSIS — D32.9 MENINGIOMA (HCC): Primary | ICD-10-CM

## 2025-02-25 DIAGNOSIS — F41.1 GAD (GENERALIZED ANXIETY DISORDER): ICD-10-CM

## 2025-02-25 DIAGNOSIS — H93.19 TINNITUS, UNSPECIFIED LATERALITY: Primary | ICD-10-CM

## 2025-02-25 PROCEDURE — 99214 OFFICE O/P EST MOD 30 MIN: CPT

## 2025-02-25 RX ORDER — ALPRAZOLAM 0.5 MG
TABLET ORAL
Qty: 6 TABLET | Refills: 0 | Status: SHIPPED | OUTPATIENT
Start: 2025-02-25 | End: 2025-02-28

## 2025-02-25 NOTE — TELEPHONE ENCOUNTER
Pt states ENT is booked until April. Asking if there is anyway we can get her in sooner or someone else can see her? She is not able to wait that long

## 2025-02-25 NOTE — TELEPHONE ENCOUNTER
Spoke to patient with PCP recommendations and recommended patient to follow up with Dr. Crane on 2/28 at 9 am. Patient verbalized understanding. Patient is refusing MRI auditory.    Dr. Martin FREEMAN

## 2025-02-25 NOTE — TELEPHONE ENCOUNTER
Pt is wondering if she could get a referral to an ENT specialist as she I having unbearable noise in her ears as well as dizziness.    Says noise starts low and then get louder.    Is wondering if she could get referral to go straight there instead of having to come back here.    Pt best call back # [phone]    Future Appointments   Date Time Provider Department Center   2/25/2025  1:30 PM Seun [facility] MD [facility]   2/28/2025  9:00 AM Robin Crane MD [facility] [place]   3/7/2025  2:30 PM Traci Lang MD [facility] [place]   3/20/2025 11:20 AM Palaniswami, Purani, MD [facility] [place]

## 2025-02-25 NOTE — TELEPHONE ENCOUNTER
Dr. Adams- Patient is still experiencing a noise in her ears and did schedule an appointment with Dr. Crane on 2/28. Ok for patient to continue to appointment for further evaluation?  Or would you like patient to see ENT? Pended referral. Please sign if agree. TY

## 2025-02-25 NOTE — PATIENT INSTRUCTIONS
- WE WILL CALL TO SCHEDULE YOUR CT SIMULATION FOR RADIATION PLANNING.       - IF YOU HAVE ANY QUESTIONS OR CONCERNS REGARDING RADIATION THERAPY, PLEASE CALL THE NURSING LINE AT [phone].

## 2025-02-25 NOTE — TELEPHONE ENCOUNTER
PATY with  Shruthi- [id number]. Sent patient a [employer] message with PCP recommendations.   Vivienne Xiao (: 1991) is a 27 y.o. male, established patient, here for evaluation of the following chief complaint(s):   Neck Pain and Referral Request       ASSESSMENT/PLAN:  Below is the assessment and plan developed based on review of pertinent labs, studies, and medications. 1. Neck pain  2. Upper back pain  3. History of repair of ACL      Return if symptoms worsen or fail to improve. SUBJECTIVE/OBJECTIVE:  Patient is seen virtually for follow-up. Reports having neck pain for a while. No obvious trauma. No related neurological issues. Denies headaches. Also having some upper back pain. OTC meds have not helped much. Has history of ACL repair. Continues to have some discomfort from time to time. Trying to be more active physically. No other major PMH. NKDA. No prescription medications. No signs or symptoms of COVID-19. Denies any other new problems.     Plan:  Reassurance that most likely neck and upper back issues are muscular  Voltaren and tizanidine prescribed  Apply heat to the area  Do stretching/ROM exercises  Proper posture discussed  Follow-up as needed    Physical Exam    [INSTRUCTIONS:  \"[x]\" Indicates a positive item  \"[]\" Indicates a negative item  -- DELETE ALL ITEMS NOT EXAMINED]    Constitutional: [x] Appears well-developed and well-nourished [x] No apparent distress      [] Abnormal -     Mental status: [x] Alert and awake  [x] Oriented to person/place/time [x] Able to follow commands    [] Abnormal -     Eyes:   EOM    [x]  Normal    [] Abnormal -   Sclera  [x]  Normal    [] Abnormal -          Discharge [x]  None visible   [] Abnormal -     HENT: [x] Normocephalic, atraumatic  [] Abnormal -   [x] Mouth/Throat: Mucous membranes are moist    External Ears [x] Normal  [] Abnormal -    Neck: [x] No visualized mass [] Abnormal -     Pulmonary/Chest: [x] Respiratory effort normal   [x] No visualized signs of difficulty breathing or respiratory distress        [] Abnormal - Musculoskeletal:   [x] Normal gait with no signs of ataxia         [x] Normal range of motion of neck        [] Abnormal -     Neurological:        [x] No Facial Asymmetry (Cranial nerve 7 motor function) (limited exam due to video visit)          [x] No gaze palsy        [] Abnormal -          Skin:        [x] No significant exanthematous lesions or discoloration noted on facial skin         [] Abnormal -            Psychiatric:       [x] Normal Affect [] Abnormal -        [x] No Hallucinations    Other pertinent observable physical exam findings:-        On this date 06/14/2021 I have spent 25 minutes reviewing previous notes, test results and face to face (virtual) with the patient discussing the diagnosis and importance of compliance with the treatment plan as well as documenting on the day of the visit. Hank Karlie, was evaluated through a synchronous (real-time) audio-video encounter. The patient (or guardian if applicable) is aware that this is a billable service. Verbal consent to proceed has been obtained within the past 12 months. The visit was conducted pursuant to the emergency declaration under the [address] [place] [address] authority and the [employer] and [facility] General Act. Patient identification was verified, and a caregiver was present when appropriate. The patient was located in a state where the provider was credentialed to provide care. An electronic signature was used to authenticate this note.   -- Tamika Barajas DO

## 2025-02-25 NOTE — H&P
[facility]  Neurological Surgery Clinic Note    Jacqui Martínez  5/3/1955  [id number]  PCP: Isabela Romano MD    REASON FOR VISIT:  Asymptomatic growing right parieto-occipital meningioma    HISTORY OF PRESENT ILLNESS:  Jacqui Martínez is a 69 year old female who presents to clinic for evaluation of an asymptomatic growing right parieto-occipital meningioma.  MRI brain 1/31/2025 demonstrates a 9 x 7 x 8 mm meningioma which measured 8 x 6 x 7 mm in 2023 and measured 5 x 3 mm in 2017.    PAST MEDICAL HISTORY:  Past Medical History:    Abdominal distention    Abdominal pain    Anemia    Anxiety    Arthritis    Back pain    Benign paroxysmal positional vertigo due to bilateral vestibular disorder    Bloating    Calculus of kidney    Cancer (HCC)    thyroid CA 10 yrs ago    Constipation    Depression    Diarrhea, unspecified    Essential hypertension    Feeling lonely    Glaucoma    Heartburn    History of depression    Hypothyroidism    Indigestion    Irregular bowel habits    Kidney stone    Loss of appetite    Mesenteric adenitis    Mild episode of recurrent major depressive disorder    Normal delivery (HCC)    x3    Pain in joints    Personal history of malignant neoplasm of thyroid    PONV (postoperative nausea and vomiting)    Right leg pain    Stress    Thyroid disease    Visual impairment    glasses    Wears glasses    Weight gain       PAST SURGICAL HISTORY:  Past Surgical History:   Procedure Laterality Date    Colonoscopy  2 yeas    Colpectomy  Same    Cystoscopy,+ureteroscopy Left 05/04/2017    L URS with BL RPG, passed stone, Dr. Mejia, EDW    Knee replacement surgery Left 04/18/2016    Performed by Dr. Jayden Richmond @ [facility]    Knee replacement surgery Right 03/22/2019    Dr. Stuart at [facility]    Knee replacement surgery Right 2020    REVISION R TKA - Dr. Mitchell    [facility] localization wire 1 site left (cpt=19281)  AGE 25    CLOGGED DUCT       47    Other surgical history  2005    thyroid surgery XRT    Other surgical history  2019    R knee CONNIE    Total knee replacement Bilateral        FAMILY HISTORY:  family history includes Breast Cancer in her sister; Cancer in her daughter; Dementia in her mother; Depression in her father; Diabetes in her mother; Heart Disease in her father and mother; Heart Disorder in her brother; Hypertension in her daughter.    SOCIAL HISTORY:   reports that she has never smoked. She has never used smokeless tobacco. She reports that she does not currently use alcohol. She reports that she does not use drugs.    ALLERGIES:  Allergies[1]    MEDICATIONS:  Medications Ordered Prior to Encounter[2]    REVIEW OF SYSTEMS:  A 10-point system was reviewed.  Pertinent positives and negatives are noted in HPI.      PHYSICAL EXAMINATION:  VITAL SIGNS: /78   Pulse 85   SpO2 96%     A&Ox3, no acute distress  PERRL, EOMi, FS, TM  Full strength x 4, no drift  Sensation intact     ASSESSMENT:  69-year-old female with a growing small right parietal occipital meningioma    I spoke with the patient and her  regarding the current clinical situation and plan of care.  We discussed the management options including continued observation, surgical resection, and radiation therapy.  We discussed the signs symptoms for which to seek medical attention.  After this discussion and answering their questions, the patient and her  agreed with proceeding with radiation therapy as recommended.    Plan:  Will review at neuro-oncology tumor board  Radiation oncology referral placed    Vinnie Mclaughlin MD  Neurological Surgery  [facility] Time: 30 min including face to face time, chart review, imaging interpretation, and coordination of care         [1] No Known Allergies  [2]   Current Outpatient Medications on File Prior to Visit   Medication Sig Dispense Refill     methylPREDNISolone (MEDROL) 4 MG Oral Tablet Therapy Pack As directed. 1 each 0    ALPRAZolam 0.25 MG Oral Tab Take 1 tablet (0.25 mg total) by mouth 2 (two) times daily as needed for Anxiety. 30 tablet 0    Meloxicam 7.5 MG Oral Tab Take 1 tablet (7.5 mg total) by mouth daily as needed for Pain. 90 tablet 1    hydroCHLOROthiazide 12.5 MG Oral Cap Take 1 capsule (12.5 mg total) by mouth daily. 90 capsule 2    levothyroxine 150 MCG Oral Tab Take 1 tablet (150 mcg total) by mouth daily. 90 tablet 3    losartan 100 MG Oral Tab Take 1 tablet (100 mg total) by mouth daily. 90 tablet 3    calcitRIOL 0.5 MCG Oral Cap Take 1 capsule (0.5 mcg total) by mouth daily. 90 capsule 0    Blood Pressure Does not apply Kit Check blood pressure daily 1 kit 0    pantoprazole 40 MG Oral Tab EC Take 1 tablet (40 mg total) by mouth every morning before breakfast. 90 tablet 3     No current facility-administered medications on file prior to visit.

## 2025-02-25 NOTE — CONSULTS
[facility]  RADIATION ONCOLOGY  CONSULTATION     PATIENT:   Jacqui Diazto        REFERRING MD:  Vinnie Mclaughlin MD  DIAGNOSIS:   Presumed meningioma R parieto-occipital convexity    HPI   68 yo here with daughter.     Incidentally found 6 x 3 mm dural based right parietal lobe lesion consistent with meningioma in 2017 for headache workup.     MRI 2021 did not show significant growth.     MRI 2023 showed an 8 x 6 mm lesion, showing subtle growth (2-3 mm) over 2 years.     Now MRI 2025 shows a 9 x 7 x 8 mm lesion, about 1 mm larger in all dimensions vs MRI in . No edema. No mass effect.    PMH  -hx of thyroid cancer s/p surgery and CROWLEY  -anxiety, needs benzo for MRI  -kidney stone    PSH  -knee replacement  -thyroidectomy    MEDS   ALPRAZolam (XANAX) 0.25 mg, Oral, 2 times daily PRN    calcitRIOL (ROCALTROL) 0.5 mcg, Oral, Daily    hydroCHLOROthiazide 12.5 mg, Oral, Daily    levothyroxine (SYNTHROID) 150 mcg, Oral, Daily    losartan (COZAAR) 100 mg, Oral, Daily    Meloxicam 7.5 mg, Oral, DAILY PRN    pantoprazole (PROTONIX) 40 mg, Oral, Every morning before breakfast     SH  -lives in [place]    FH  -no brain tumors    ROS  -pertinent items in HPI.     PHYSICAL EXAM   ECO  GENERAL: 209 pounds.  CHEST: Regular rate and rhythm.  ABD:  Soft, non-tender.  EXT:  No edema.  NEURO: Alert and oriented. CN grossly intact.    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

## 2025-02-28 ENCOUNTER — OFFICE VISIT (OUTPATIENT)
Dept: INTERNAL MEDICINE CLINIC | Facility: CLINIC | Age: 70
End: 2025-02-28
Payer: MEDICARE

## 2025-02-28 VITALS
WEIGHT: 210.63 LBS | OXYGEN SATURATION: 97 % | DIASTOLIC BLOOD PRESSURE: 62 MMHG | HEIGHT: 64 IN | BODY MASS INDEX: 35.96 KG/M2 | HEART RATE: 87 BPM | SYSTOLIC BLOOD PRESSURE: 108 MMHG | TEMPERATURE: 97 F

## 2025-02-28 DIAGNOSIS — H93.13 TINNITUS OF BOTH EARS: Primary | ICD-10-CM

## 2025-02-28 DIAGNOSIS — K21.9 GASTROESOPHAGEAL REFLUX DISEASE, UNSPECIFIED WHETHER ESOPHAGITIS PRESENT: Chronic | ICD-10-CM

## 2025-02-28 PROCEDURE — 99214 OFFICE O/P EST MOD 30 MIN: CPT | Performed by: INTERNAL MEDICINE

## 2025-02-28 PROCEDURE — 1160F RVW MEDS BY RX/DR IN RCRD: CPT | Performed by: INTERNAL MEDICINE

## 2025-02-28 PROCEDURE — 3074F SYST BP LT 130 MM HG: CPT | Performed by: INTERNAL MEDICINE

## 2025-02-28 PROCEDURE — 3078F DIAST BP <80 MM HG: CPT | Performed by: INTERNAL MEDICINE

## 2025-02-28 PROCEDURE — 1159F MED LIST DOCD IN RCRD: CPT | Performed by: INTERNAL MEDICINE

## 2025-02-28 PROCEDURE — 3008F BODY MASS INDEX DOCD: CPT | Performed by: INTERNAL MEDICINE

## 2025-02-28 PROCEDURE — 1126F AMNT PAIN NOTED NONE PRSNT: CPT | Performed by: INTERNAL MEDICINE

## 2025-02-28 RX ORDER — PANTOPRAZOLE SODIUM 40 MG/1
40 TABLET, DELAYED RELEASE ORAL
Qty: 90 TABLET | Refills: 0 | Status: SHIPPED | OUTPATIENT
Start: 2025-02-28

## 2025-02-28 RX ORDER — PANTOPRAZOLE SODIUM 40 MG/1
40 TABLET, DELAYED RELEASE ORAL
Qty: 90 TABLET | Refills: 3 | Status: CANCELLED | OUTPATIENT
Start: 2025-02-28

## 2025-02-28 NOTE — PROGRESS NOTES
Subjective:   Jacqui Martínez is a 69 year old female who presents for Ringing In Ear (C/o ringing in b/l ears x 3 weeks. Patient states it is constant. Does have hx of vertigo. She does not have any ear pain. She has some dizzy and with balance problems. Also with some occasional headaches to temples. )     The patient has history of meningioma and recently visited radio oncology.  Stereotactic radiosurgery was advised.  She has been stressed.    Patient has completed the systemic steroid course recently.  There is some correlation with the steroid use and the tinnitus.  History/Other:    Chief Complaint Reviewed and Verified  Nursing Notes Reviewed and   Verified  Tobacco Reviewed  Allergies Reviewed  Medications Reviewed    Medical History Reviewed  Surgical History Reviewed  OB Status Reviewed    Social History Reviewed         Tobacco:  She smoked tobacco in the past but quit greater than 12 months ago.  Social History     Tobacco Use   Smoking Status Former    Types: Cigarettes   Smokeless Tobacco Never   Tobacco Comments    Smoked cigarettes x 1 year     \"Quit more than 20 years ago\"        Current Outpatient Medications   Medication Sig Dispense Refill    Meloxicam 7.5 MG Oral Tab Take 1 tablet (7.5 mg total) by mouth daily as needed for Pain. 90 tablet 1    hydroCHLOROthiazide 12.5 MG Oral Cap Take 1 capsule (12.5 mg total) by mouth daily. 90 capsule 2    levothyroxine 150 MCG Oral Tab Take 1 tablet (150 mcg total) by mouth daily. 90 tablet 3    losartan 100 MG Oral Tab Take 1 tablet (100 mg total) by mouth daily. 90 tablet 3    calcitRIOL 0.5 MCG Oral Cap Take 1 capsule (0.5 mcg total) by mouth daily. 90 capsule 0    pantoprazole 40 MG Oral Tab EC Take 1 tablet (40 mg total) by mouth every morning before breakfast. 90 tablet 3         Review of Systems:  Pertinent items are noted in HPI.  A comprehensive review of systems was negative.      Objective:   /62 (BP Location: Right arm, Patient  Position: Sitting, Cuff Size: adult)   Pulse 87   Temp 97.3 °F (36.3 °C) (Temporal)   Ht 5' 4\" (1.626 m)   Wt 210 lb 9.6 oz (95.5 kg)   SpO2 97%   BMI 36.15 kg/m²  Estimated body mass index is 36.15 kg/m² as calculated from the following:    Height as of this encounter: 5' 4\" (1.626 m).    Weight as of this encounter: 210 lb 9.6 oz (95.5 kg).    General condition: Not in distress.  Speaks in full sentences  HEENT: Atraumatic normocephalic  Otoscopic examination reveals normal tympanic membranes bilaterally.  No cervical or submandibular lymphadenopathy  Chest: Clear to auscultate  Heart: S1-S2 ,no murmur  Abdomen: Soft non tender, no palpable organomegaly  Neurological examination: No facial asymmetry.  No lateralizing neurological signs.  Normal coordination.  Normal gait.  Mental state examination: Good eye to eye contact.  Normal mood and behavior.  Engaged in meaningful conversations.  Extremities:  Normal upper extremity  Legs: No edema      Assessment & Plan:   1. Gastroesophageal reflux disease, unspecified whether esophagitis present  - pantoprazole prn    2. Tinnitus:  -Appears to be associated with the steroid use.  -If symptoms persist, ENT referral is done.    3. Hypertension  BP Readings from Last 3 Encounters:   02/28/25 108/62   02/25/25 154/90   02/18/25 116/78   The patient is on losartan and hydrochlorothiazide.  She is on low-dose of hydrochlorothiazide and possibly can omit it.  If in case blood pressure goes beyond 130/80, she could resume the hydrochlorothiazide.    #3: Yblkwukbbv-rikklu-lm with the radio oncology for possible stereotactic radiosurgery.    #4: Hypothyroidism: Continue with levothyroxine.    #5 Depressive symptoms but without suicidality.  The patient has good family support and is not interested currently for therapist.  She is not interested in medications as well.    #5:  Vaccines:    -Flu shot  10/23/2024     Age and Sex specific cancer and other  screening:    Colonoscopy : 08/19/2021   Last mammogram: 12/19/2024   Dexa Scan: 8/25/2023           No follow-ups on file.    Robin Crane MD, 2/28/2025, 9:12 AM

## 2025-02-28 NOTE — PATIENT INSTRUCTIONS
Follow up with ENT if symptoms not improved:     Jarad Alba MD [address] [facility] [id number] [phone]

## 2025-03-01 ENCOUNTER — HOSPITAL ENCOUNTER (OUTPATIENT)
Dept: RADIATION ONCOLOGY | Facility: HOSPITAL | Age: 70
Discharge: HOME | End: 2025-03-01
Attending: RADIOLOGY
Payer: MEDICARE

## 2025-03-03 ENCOUNTER — HOSPITAL ENCOUNTER (OUTPATIENT)
Dept: RADIATION ONCOLOGY | Facility: HOSPITAL | Age: 70
Discharge: HOME | End: 2025-03-03
Attending: RADIOLOGY
Payer: MEDICARE

## 2025-03-03 PROCEDURE — 77334 RADIATION TREATMENT AID(S): CPT | Performed by: RADIOLOGY

## 2025-03-03 PROCEDURE — 77290 THER RAD SIMULAJ FIELD CPLX: CPT | Performed by: RADIOLOGY

## 2025-03-03 PROCEDURE — 77399 UNLISTED PX MED RADJ PHYSICS: CPT | Performed by: RADIOLOGY

## 2025-03-03 PROCEDURE — 77470 SPECIAL RADIATION TREATMENT: CPT | Performed by: RADIOLOGY

## 2025-03-05 PROCEDURE — 77334 RADIATION TREATMENT AID(S): CPT | Performed by: RADIOLOGY

## 2025-03-05 PROCEDURE — 77300 RADIATION THERAPY DOSE PLAN: CPT | Performed by: RADIOLOGY

## 2025-03-05 PROCEDURE — 77295 3-D RADIOTHERAPY PLAN: CPT | Performed by: RADIOLOGY

## 2025-03-14 ENCOUNTER — DOCUMENTATION ONLY (OUTPATIENT)
Dept: RADIATION ONCOLOGY | Facility: HOSPITAL | Age: 70
End: 2025-03-14

## 2025-03-14 ENCOUNTER — HOSPITAL ENCOUNTER (OUTPATIENT)
Dept: RADIATION ONCOLOGY | Facility: HOSPITAL | Age: 70
Discharge: HOME | End: 2025-03-14
Attending: RADIOLOGY
Payer: MEDICARE

## 2025-03-14 VITALS
OXYGEN SATURATION: 96 % | HEART RATE: 73 BPM | TEMPERATURE: 98 F | SYSTOLIC BLOOD PRESSURE: 143 MMHG | RESPIRATION RATE: 20 BRPM | DIASTOLIC BLOOD PRESSURE: 84 MMHG

## 2025-03-14 DIAGNOSIS — D32.9 MENINGIOMA (HCC): Primary | ICD-10-CM

## 2025-03-14 PROCEDURE — 77372 SRS LINEAR BASED: CPT | Performed by: RADIOLOGY

## 2025-03-14 PROCEDURE — 77280 THER RAD SIMULAJ FIELD SMPL: CPT | Performed by: RADIOLOGY

## 2025-03-14 NOTE — PATIENT INSTRUCTIONS
- WE WILL CALL TO SCHEDULE YOU FOR A FOLLOW-UP WITH DR. SANDRA IN 1 YEAR, AFTER YOUR MRI  - CALL CENTRAL SCHEDULING AT [phone] TO SCHEDULE YOUR MRI OF BRAIN FOR MARCH 2026  - FOLLOW-UP WITH DR. DEJESUS AFTER RADIATION COMPLETION  - SIDE EFFECTS OF RADIATION WILL GRADUALLY SUBSIDE. IT MAY TAKE 1- 2 WEEKS POST-RADIATION FOR YOU TO NOTICE CHANGES SUCH AS A DECREASE IN YOUR FATIGUE LEVEL, DECREASE IN HEADACHES, DECREASE IN SCALP TENDERNESS/ITCHING.   - CALL THE NURSE LINE AT [phone] IF YOU HAVE ANY QUESTIONS/CONCERNS REGARDING RADIATION THERAPY.

## 2025-03-14 NOTE — PROGRESS NOTES
[facility] RADIATION ONCOLOGY  TREATMENT SUMMARY    PATIENT:  Jacqui Diazto    REFERRING MD: BERLIN Mclaughlin MD  DIAGNOSIS:  Presumed R parietal convexity meningioma    HISTORY:  -6 mm lesion found incidentally during HA workup in 11/2017  -followed with MRI in 2021, 2023 and again 1/2025 showing 9 x 7 x 8 mm lesion, larger, no edema, still asymptomatic  -elected to treat with SRS    DOSE DELIVERED:    R parietal convexity mass   1500 cGy in 1 fraction(s)   6 MV photons   SRS   3/14/2025     Tolerated SRS without issue    PLAN:  MRI 3/2026  F/u after MRI    Senu Neumann MD  Radiation Oncology

## 2025-03-14 NOTE — PROGRESS NOTES
[facility] Radiation Treatment Management Note 1-5    Patient:  Jacqui Martínez  Age:  69 year old  Visit Diagnosis:    1. Meningioma (HCC)      Primary Rad/Onc:  Dr. Seun Neumann    Site Delivered Dose (cGy) Prescribed Dose (cGy) Fraction #   SRS R PARIETAL 1500 1500 1/1     First treatment date:   3/14/25  Concurrent chemotherapy:  N/A        2/25/2025     1:50 PM 2/28/2025     9:01 AM 3/14/2025    10:31 AM   Oncology Vitals   Height  5' 4\"    Height  163 cm    Weight  210 lb 9.6 oz    Weight  95.528 kg    BSA (m2)  2 m2    BMI  36.15 kg/m2    /90 108/62 143/84   Pulse 72 87 73   Resp 18  20   Temp 98.4 °F (36.9 °C) 97.3 °F (36.3 °C) 98.4 °F (36.9 °C)   SpO2 97 % 97 % 96 %   Pain Score 5  0        Toxicities:  Fatigue Grade 0= None  Nausea Grade 0= None  Vomiting Grade 0= None  Muscle weakness Grade 0= None  Dizziness Grade 0= None  Blurred vision Grade 0= None  Headaches Grade 0= None  Gait disturbance Grade 0= None    Nursing Note:  Tolerated SRS today, AVS to give.  Has mild imbalance and dizziness d/t anti-anxiety med pre-tx.  Daughter with pt, is pt's .    Lea LOJA RN    Physician Note:  Subjective:  -tolerated SRS well, just dizzy for anxiety meds      Objective:  Vitals noted  ECOG 0  NAD      Treatment setup imaging have been reviewed:  Yes    Assessment/Plan:  -completed RT  -follow-up with neurosurg    We discussed expected future toxicity. All questions answered.  RTC 1yr for Dr. Nel Chau MD

## 2025-03-17 NOTE — ADDENDUM NOTE
Encounter addended by: Vinnie Mclaughlin MD on: 3/17/2025 12:10 PM   Actions taken: Clinical Note Signed, Charge Capture section accepted

## 2025-03-17 NOTE — OPERATIVE REPORT
MRN:  [id number]  Patient Name:  Jacqui Martínez  YOB: 1955     DATE OF TREATMENT:  3/14/2025     DIAGNOSIS:  Growing asymptomatic right parieto-occipital meningioma      PROCEDURE:  Stereotactic radiosurgery.     NEUROSURGEON:  Vinnie Mclaughlin MD     RADIATION ONCOLOGIST:  Seun Neumann MD     ANESTHESIA:  Not applicable.     COMPLICATIONS:  None.     INDICATIONS FOR PROCEDURE:  The patient is a 69 year old female who presented to clinic for evaluation of an asymptomatic growing right parieto-occipital meningioma.  MRI brain 1/31/2025 demonstrates a 9 x 7 x 8 mm meningioma which measured 8 x 6 x 7 mm in 2023 and measured 5 x 3 mm in 2017.  She now presents for stereotactic radiosurgery to that lesion. The risks, benefits, alternatives, goals, and expectations of doing this were explained in detail to her and she wished to proceed as planned.     DESCRIPTION OF PROCEDURE:  The patient was taken to the radiation therapy oncology suite.  The frameless stereotactic radiosurgery plan was developed and carefully reviewed by Dr. Neumann and myself to prescribe a target dose of 15 Gy to the target volume in a single fraction.  The plan was approved.  The radiosurgery was administered. Details of the plan are in the radiation oncology therapy notes.

## 2025-04-22 ENCOUNTER — OFFICE VISIT (OUTPATIENT)
Dept: INTERNAL MEDICINE CLINIC | Facility: CLINIC | Age: 70
End: 2025-04-22
Payer: MEDICARE

## 2025-04-22 VITALS
OXYGEN SATURATION: 98 % | TEMPERATURE: 98 F | BODY MASS INDEX: 37 KG/M2 | SYSTOLIC BLOOD PRESSURE: 126 MMHG | WEIGHT: 214.19 LBS | RESPIRATION RATE: 16 BRPM | DIASTOLIC BLOOD PRESSURE: 70 MMHG | HEART RATE: 82 BPM

## 2025-04-22 DIAGNOSIS — K76.0 FATTY LIVER: ICD-10-CM

## 2025-04-22 DIAGNOSIS — M25.551 RIGHT HIP PAIN: Primary | ICD-10-CM

## 2025-04-22 PROCEDURE — 99214 OFFICE O/P EST MOD 30 MIN: CPT | Performed by: INTERNAL MEDICINE

## 2025-04-22 PROCEDURE — 3078F DIAST BP <80 MM HG: CPT | Performed by: INTERNAL MEDICINE

## 2025-04-22 PROCEDURE — 1159F MED LIST DOCD IN RCRD: CPT | Performed by: INTERNAL MEDICINE

## 2025-04-22 PROCEDURE — 1160F RVW MEDS BY RX/DR IN RCRD: CPT | Performed by: INTERNAL MEDICINE

## 2025-04-22 PROCEDURE — 3074F SYST BP LT 130 MM HG: CPT | Performed by: INTERNAL MEDICINE

## 2025-04-22 RX ORDER — TRIAMCINOLONE ACETONIDE 1 MG/G
CREAM TOPICAL
COMMUNITY
Start: 2025-03-13

## 2025-04-22 NOTE — PATIENT INSTRUCTIONS
- Take meloxicam once daily every day for 1 month  - See handout on hip exercises  - Check with [employer] to see if they will cover the ultrasound for your fatty liver:  Name of test:   Ultrasound of liver with elastography (CPT codes 24570, 61946)    Diagnosis:  Fatty liver (K76.0)    - Clarence's glipizide refilled  - Call central scheduling at [phone] to schedule Clarence's left arm ultrasound  - Can stop by our  to schedule hospital follow up office visit for Clarence.    It was a pleasure seeing you in the clinic today.  Thank you for choosing the [facility] [place] office for your healthcare needs. Please call at [phone] with any questions or concerns.    Traci Lang MD

## 2025-04-22 NOTE — PROGRESS NOTES
Jacqui Martínez is a 69 year old female.   HPI:   Patient presents for follow up on right hip pain.  Saw Dr. Adams for this issue two months ago.  Meloxicam was not helping at that time, though she was taking that more PRN. Prescribed Medrol dosepak at that time.  Referred to Orthopedics, has not seen them. Pain is intermittent - not every day.  Severity fluctuates as well.  If she does chores/cleaning at home, is more active, pain is worse after that.  She does some stretches at night before going to bed.    Other chronic issues:  Fatty liver - no jaundice or icterus.     Past medical, family, surgical and social history were reviewed as listed in the chart, and are unchanged from previous visit on 2/28/2025  REVIEW OF SYSTEMS:   GENERAL/ const: no fevers/chills, no unintentional weight loss  EYES:no vision problems  HEENT: denies sinus pain or sinus tenderness  LUNGS: denies shortness of breath   CARDIOVASCULAR: denies chest pain  GI: denies nausea/emesis/ abdominal pain diarrhea constipation  MUSCULOSKELETAL: hip pain, right > left  NEURO: denies headaches  ALLERGY: Allergies[1]  PAST HISTORY:   Medications - Current[2]  Medical:  has a past medical history of Abdominal distention, Abdominal pain, Anemia, Anxiety, Arthritis, Back pain, Benign paroxysmal positional vertigo due to bilateral vestibular disorder (02/20/2018), Bloating, Calculus of kidney, Cancer ([facility]), Constipation, Depression (14 years), Diarrhea, unspecified, Essential hypertension, Feeling lonely, Glaucoma, Heartburn, History of depression, Hypothyroidism, Indigestion, Irregular bowel habits, Kidney stone, Loss of appetite, Meningioma ([facility]) (2025), Mesenteric adenitis (3/3/2023), Mild episode of recurrent major depressive disorder (3/3/2021), Normal delivery ([facility]), Pain in joints, Personal history of malignant neoplasm of thyroid (11/17/2011), PONV (postoperative nausea and vomiting), Right leg pain (08/19/2020), Stress, Thyroid disease, Visual  impairment, Wears glasses, and Weight gain.  Surgical:  has a past surgical history that includes cystoscopy,+ureteroscopy (Left, 2017); other surgical history (2005); other surgical history (2019); valeria localization wire 1 site left (cpt=19281) (AGE 25); total knee replacement (Bilateral); knee replacement surgery (Left, 2016); knee replacement surgery (Right, 2019); knee replacement surgery (Right, ); colonoscopy (2 yeas); colpectomy (Same); and  (47).  Family: family history includes Breast Cancer in her sister; Cancer in her daughter and father; Dementia in her mother; Depression in her father; Diabetes in her mother; Heart Disease in her father and mother; Heart Disorder in her brother; Hypertension in her daughter.  Social:  reports that she has quit smoking. Her smoking use included cigarettes. She has never used smokeless tobacco. She reports that she does not currently use alcohol. She reports that she does not use drugs.  Wt Readings from Last 6 Encounters:   25 214 lb 3.2 oz (97.2 kg)   25 210 lb 9.6 oz (95.5 kg)   25 209 lb (94.8 kg)   25 212 lb (96.2 kg)   24 212 lb (96.2 kg)   10/23/24 211 lb 12.8 oz (96.1 kg)     EXAM:   /70 (BP Location: Left arm, Patient Position: Sitting, Cuff Size: large)   Pulse 82   Temp 98 °F (36.7 °C) (Temporal)   Resp 16   Wt 214 lb 3.2 oz (97.2 kg)   SpO2 98%   Breastfeeding No   BMI 36.77 kg/m²   GENERAL: Alert and oriented, well developed, well nourished,in no apparent distress  HEENT: atraumatic, PERRLA, EOMI, normal lid and conjunctiva  LUNGS: clear to auscultation bilaterally, no wheezing/rubs  CARDIO: RRR without murmurs.  No clubbing, cyanosis or edema.  GI: soft non tender nondistended no hepatosplenomegaly, bowel sounds throughout  NEURO: CN II-XII intact, 5/5 strength all extremities  PSYCH: pleasant, appropriate mood and affect  ASSESSMENT AND PLAN:   1. Right hip pain  Patient with  continued right hip pain.  Worse with heavy physical activities.  She had x-rays of hips done last fall - mild arthritic changes.  Prescribed meloxicam by Rheumatology - takes occasionally.  I advised her to take the meloxicam once daily every day for 1 month, then go back to PRN usage.  Home physical therapy handout provided.  She is not interested in referral to look into hip joint injections.  May consider physical therapy referral.    2. Fatty liver  Chronic issue, most recently noted on abdominal ultrasound from 2023.  No jaundice or icterus.  She is concerned about progression of fatty liver.  Discussed importance of lifestyle modification.  Ultrasound with elastography ordered - advised patient to check with insurance first about coverage for this test before scheduling it.   - US LIVER WITH ELASTOGRAPHY(CPT=76705,12684); Future    Patient Care Team:  Isabela Allen MD as PCP - General (Internal Medicine)  Geno Naqvi MD as Consulting Physician (ENDOCRINOLOGY)  Peter Serna as Consulting Physician (OPHTHALMOLOGY)  Raissa Isaacs MD as Consulting Physician (NEUROLOGY)  An Russell, PADDY as Physical Therapist (Physical Therapy)  Jocelynn Chin APRN (Nurse Practitioner)  Vinnie Mclaughlin MD (NEUROSURGERY)  Kandi Garzon, Generic Provider  Seun Neumann MD (Radiation Oncology)  The patient indicates understanding of these issues and agrees to the plan.  The patient is asked to return to clinic as needed/scheduled/due with Dr. Angie Lang MD           [1] No Known Allergies  [2]   Current Outpatient Medications:     triamcinolone 0.1 % External Cream, , Disp: , Rfl:     pantoprazole 40 MG Oral Tab EC, Take 1 tablet (40 mg total) by mouth every morning before breakfast., Disp: 90 tablet, Rfl: 0    Meloxicam 7.5 MG Oral Tab, Take 1 tablet (7.5 mg total) by mouth daily as needed for Pain., Disp: 90 tablet, Rfl: 1    levothyroxine 150 MCG Oral Tab, Take 1 tablet (150  mcg total) by mouth daily., Disp: 90 tablet, Rfl: 3    losartan 100 MG Oral Tab, Take 1 tablet (100 mg total) by mouth daily., Disp: 90 tablet, Rfl: 3    calcitRIOL 0.5 MCG Oral Cap, Take 1 capsule (0.5 mcg total) by mouth daily., Disp: 90 capsule, Rfl: 0    hydroCHLOROthiazide 12.5 MG Oral Cap, Take 1 capsule (12.5 mg total) by mouth daily. (Patient not taking: Reported on 4/22/2025), Disp: 90 capsule, Rfl: 2

## 2025-05-22 ENCOUNTER — HOSPITAL ENCOUNTER (OUTPATIENT)
Dept: ULTRASOUND IMAGING | Age: 70
Discharge: HOME | End: 2025-05-22
Attending: INTERNAL MEDICINE
Payer: MEDICARE

## 2025-05-22 DIAGNOSIS — K76.0 FATTY LIVER: ICD-10-CM

## 2025-05-22 PROCEDURE — 76981 USE PARENCHYMA: CPT | Performed by: INTERNAL MEDICINE

## 2025-05-22 PROCEDURE — 76705 ECHO EXAM OF ABDOMEN: CPT | Performed by: INTERNAL MEDICINE

## 2025-05-23 ENCOUNTER — PATIENT MESSAGE (OUTPATIENT)
Dept: INTERNAL MEDICINE CLINIC | Facility: CLINIC | Age: 70
End: 2025-05-23

## 2025-05-23 DIAGNOSIS — K74.00 FIBROSIS OF LIVER: Primary | ICD-10-CM

## 2025-05-29 ENCOUNTER — TELEPHONE (OUTPATIENT)
Dept: INTERNAL MEDICINE CLINIC | Facility: CLINIC | Age: 70
End: 2025-05-29

## 2025-05-29 NOTE — TELEPHONE ENCOUNTER
Spoke with patient to discuss results and recommendations per covering PCP.     Pt requesting if she can see a hepatologist closer to her and if PCP would be willing to see patient sooner.     DVF: Please advise, TY!

## 2025-05-29 NOTE — TELEPHONE ENCOUNTER
Pt is requesting to speak w/ RN about her US results. No RN avail at time of call.    Pt informed a RN will reach out when avail. Pt v/u and will await c/b.

## 2025-05-29 NOTE — TELEPHONE ENCOUNTER
Patient checking on status of returned call. Patient informed that she'll have to allow more time for a nurse to return her call.     Phone number for hepatologist provided to patient.

## 2025-05-30 NOTE — TELEPHONE ENCOUNTER
Spoke with patient to inform of PCP recommendations. Pt v/u and will call Dr. Moran office to scheduled an appointment.

## 2025-05-30 NOTE — TELEPHONE ENCOUNTER
Dr. Moran and Dr. Llamas are the local hepatologists that we refer to since they have offices at [place]. Other hepatology groups we refer to are at [place] (but I don't know if [place] is accepting [employer] anymore). She could also try Dr. Santoro but I believe he is in [place] as well.     Should also make appt with me.

## 2025-06-02 ENCOUNTER — OFFICE VISIT (OUTPATIENT)
Dept: INTERNAL MEDICINE CLINIC | Facility: CLINIC | Age: 70
End: 2025-06-02
Payer: MEDICARE

## 2025-06-02 ENCOUNTER — TELEPHONE (OUTPATIENT)
Dept: INTERNAL MEDICINE CLINIC | Facility: CLINIC | Age: 70
End: 2025-06-02

## 2025-06-02 VITALS
RESPIRATION RATE: 14 BRPM | SYSTOLIC BLOOD PRESSURE: 120 MMHG | BODY MASS INDEX: 37 KG/M2 | WEIGHT: 214 LBS | DIASTOLIC BLOOD PRESSURE: 70 MMHG | OXYGEN SATURATION: 98 % | HEART RATE: 70 BPM | TEMPERATURE: 98 F

## 2025-06-02 DIAGNOSIS — K76.0 FATTY LIVER: Primary | ICD-10-CM

## 2025-06-02 DIAGNOSIS — K74.00 LIVER FIBROSIS: ICD-10-CM

## 2025-06-02 DIAGNOSIS — E66.01 SEVERE OBESITY (BMI 35.0-39.9) WITH COMORBIDITY (HCC): Chronic | ICD-10-CM

## 2025-06-02 PROCEDURE — 1160F RVW MEDS BY RX/DR IN RCRD: CPT | Performed by: INTERNAL MEDICINE

## 2025-06-02 PROCEDURE — 99214 OFFICE O/P EST MOD 30 MIN: CPT | Performed by: INTERNAL MEDICINE

## 2025-06-02 PROCEDURE — 3078F DIAST BP <80 MM HG: CPT | Performed by: INTERNAL MEDICINE

## 2025-06-02 PROCEDURE — 3074F SYST BP LT 130 MM HG: CPT | Performed by: INTERNAL MEDICINE

## 2025-06-02 PROCEDURE — 1159F MED LIST DOCD IN RCRD: CPT | Performed by: INTERNAL MEDICINE

## 2025-06-02 NOTE — PROGRESS NOTES
Jacqui Martínez is a 70 year old female.   HPI:     No chief complaint on file.    Patient presents for follow up on several issues.  Fatty liver - had ultrasound with elastography done; showed moderate to severe liver fibrosis.  No jaundice or icterus, no abdominal bloating.    Obesity - Body mass index is 36.73 kg/m².    Past medical, family, surgical and social history were reviewed as listed in the chart, and are unchanged from previous visit.    REVIEW OF SYSTEMS:   GENERAL/ const: no fevers/chills, no unintentional weight loss  EYES:no vision problems  HEENT: denies sinus pain or sinus tenderness  LUNGS: denies shortness of breath   CARDIOVASCULAR: denies chest pain  GI: denies nausea/emesis/ abdominal pain diarrhea constipation  NEURO: denies headaches  PSYCHIATRIC: denies issues  ENDOCRINE: no hot/cold intolerance  ALLERGY: Allergies[1]  PAST HISTORY:     Medications - Current[2]  Medical:  has a past medical history of Abdominal distention, Abdominal pain, Anemia, Anxiety, Arthritis, Back pain, Benign paroxysmal positional vertigo due to bilateral vestibular disorder (02/20/2018), Bloating, Calculus of kidney, Cancer (HCC), Constipation, Depression (14 years), Diarrhea, unspecified, Essential hypertension, Feeling lonely, Glaucoma, Heartburn, History of depression, Hypothyroidism, Indigestion, Irregular bowel habits, Kidney stone, Loss of appetite, Meningioma (HCC) (2025), Mesenteric adenitis (3/3/2023), Mild episode of recurrent major depressive disorder (3/3/2021), Normal delivery (HCC), Pain in joints, Personal history of malignant neoplasm of thyroid (11/17/2011), PONV (postoperative nausea and vomiting), Right leg pain (08/19/2020), Stress, Thyroid disease, Visual impairment, Wears glasses, and Weight gain.  Surgical:  has a past surgical history that includes cystoscopy,+ureteroscopy (Left, 05/04/2017); other surgical history (01/01/2005); other surgical history (05/2019); [facility] localization wire 1 site  left (cpt=19281) (AGE 25); total knee replacement (Bilateral); knee replacement surgery (Left, 2016); knee replacement surgery (Right, 2019); knee replacement surgery (Right, ); colonoscopy (2 yeas); colpectomy (Same); and  (47).  Family: family history includes Breast Cancer in her sister; Cancer in her daughter and father; Dementia in her mother; Depression in her father; Diabetes in her mother; Heart Disease in her father and mother; Heart Disorder in her brother; Hypertension in her daughter.  Social:  reports that she has never smoked. She has never used smokeless tobacco. She reports that she does not currently use alcohol. She reports that she does not use drugs.  Wt Readings from Last 6 Encounters:   25 214 lb (97.1 kg)   25 214 lb 3.2 oz (97.2 kg)   25 210 lb 9.6 oz (95.5 kg)   25 209 lb (94.8 kg)   25 212 lb (96.2 kg)   24 212 lb (96.2 kg)       EXAM:   /70 (BP Location: Right arm, Patient Position: Sitting, Cuff Size: adult)   Pulse 70   Temp 98.2 °F (36.8 °C) (Temporal)   Resp 14   Wt 214 lb (97.1 kg)   SpO2 98%   BMI 36.73 kg/m²   GENERAL: Alert and oriented, well developed, well nourished,in no apparent distress  HEENT: atraumatic, PERRLA, EOMI, normal lid and conjunctiva  LUNGS: clear to auscultation bilaterally, no wheezing/rubs  CARDIO: RRR without murmurs.  No clubbing, cyanosis or edema.  GI: soft non tender nondistended no hepatosplenomegaly, bowel sounds throughout  NEURO: CN II-XII intact, 5/5 strength all extremities  PSYCH: pleasant, appropriate mood and affect  ASSESSMENT AND PLAN:   1. Fatty liver  2. Liver fibrosis  3. Severe obesity (BMI 35.0-39.9) with comorbidity (HCC)  History of fatty liver.  Ultrasound with elastography did show fibrosis.  Discussed results in detail with patient.  Fatty liver diet handout provided.  She states [facility] is out of network for her insurance. Referral entered for Dr. Santoro - patient has in  network GI specialist name written down at home, she will verify that it is Dr. Santoro, if not she will call us back with the name.  Body mass index is 36.73 kg/m².  Focus on diet/exercise - this should help with the fatty liver as well.  - Hepatology Referral - External    Patient Care Team:  Isabela Allen MD as PCP - General (Internal Medicine)  Geno Naqvi MD as Consulting Physician (ENDOCRINOLOGY)  Peter Serna as Consulting Physician (OPHTHALMOLOGY)  Raissa Isaacs MD as Consulting Physician (NEUROLOGY)  An Russell PT as Physical Therapist (Physical Therapy)  Jocelynn Chin APRN (Nurse Practitioner)  Vinnie Mclaughlin MD (NEUROSURGERY)  Kandi Garzon, Generic Provider  Seun Neumann MD (Radiation Oncology)  The patient indicates understanding of these issues and agrees to the plan.  The patient is asked to return to clinic in 3-6 months for follow up on chronic issues, or earlier if acute issues arise.    Traci Lang MD           [1] No Known Allergies  [2]   Current Outpatient Medications:     triamcinolone 0.1 % External Cream, , Disp: , Rfl:     pantoprazole 40 MG Oral Tab EC, Take 1 tablet (40 mg total) by mouth every morning before breakfast., Disp: 90 tablet, Rfl: 0    Meloxicam 7.5 MG Oral Tab, Take 1 tablet (7.5 mg total) by mouth daily as needed for Pain., Disp: 90 tablet, Rfl: 1    hydroCHLOROthiazide 12.5 MG Oral Cap, Take 1 capsule (12.5 mg total) by mouth daily., Disp: 90 capsule, Rfl: 2    levothyroxine 150 MCG Oral Tab, Take 1 tablet (150 mcg total) by mouth daily., Disp: 90 tablet, Rfl: 3    losartan 100 MG Oral Tab, Take 1 tablet (100 mg total) by mouth daily., Disp: 90 tablet, Rfl: 3    calcitRIOL 0.5 MCG Oral Cap, Take 1 capsule (0.5 mcg total) by mouth daily., Disp: 90 capsule, Rfl: 0

## 2025-06-02 NOTE — TELEPHONE ENCOUNTER
Pt requesting her referral to Dr Santoro to be faxed once authorized   Currently in open status   Informed pt we will keep an eye on it

## 2025-06-02 NOTE — PATIENT INSTRUCTIONS
- Schedule appointment with the liver specialist:  Dr. Walter Carmona  [address] [place] [id number]  Phone: [phone]    - If that is not the right doctor (check the information you have at home), call us with the name of the doctor your insurance recommended    - See handout on liver and diet    It was a pleasure seeing you in the clinic today.  Thank you for choosing the [facility] office for your healthcare needs. Please call at [phone] with any questions or concerns.    Traci Lang MD

## 2025-06-05 ENCOUNTER — TELEPHONE (OUTPATIENT)
Dept: INTERNAL MEDICINE CLINIC | Facility: CLINIC | Age: 70
End: 2025-06-05

## 2025-06-05 NOTE — TELEPHONE ENCOUNTER
In basket message sent to the referral department.  
[employer] message sent.  
Pt called in requesting that the office please call her insurance at [phone] to expedite the referral to Dr. Santoro. I did inform the patient about the referral process, but still would like us to call.   
Spoke to patient to relay authorized hepatology referral for Dr. Santoro. Patient verbalized understanding. Patient is aware of [employer] message sent.  
Hyperlipidemia, unspecified hyperlipidemia type

## 2025-06-18 ENCOUNTER — TELEPHONE (OUTPATIENT)
Dept: INTERNAL MEDICINE CLINIC | Facility: CLINIC | Age: 70
End: 2025-06-18

## 2025-06-18 DIAGNOSIS — R26.89 DECREASED MOBILITY: ICD-10-CM

## 2025-06-18 DIAGNOSIS — M25.551 PAIN OF RIGHT HIP: Primary | ICD-10-CM

## 2025-06-18 NOTE — TELEPHONE ENCOUNTER
Per pt msg in basket has a lot of pain in one of her legs.    Please advice pt as her PCP nor other providers in office have an appt any time soon.

## 2025-06-18 NOTE — TELEPHONE ENCOUNTER
Spoke to patient with PCP recommendations. Patient verbalized understanding. This RN relayed orthopedic, Dr. Robert's contact information.

## 2025-06-18 NOTE — TELEPHONE ENCOUNTER
She needs to see ortho. She was reminded of this multiple times.   Placed an MRI order in the interim but it may not be covered without having done PT.   Should see ortho.

## 2025-06-18 NOTE — TELEPHONE ENCOUNTER
Action Requested: Summary for Provider     []  Critical Lab, Recommendations Needed  [x] Need Additional Advice  []   FYI    []   Need Orders  [] Need Medications Sent to Pharmacy  []  Other     SUMMARY:     Spoke to patient who continues to experience right leg and hip pain. Patient states the pain has worsened for the last 3 days. Patient reports leg discomfort, radiating tingling from right leg to right hip, and she feels like her \" right hip is disconnected from her body\". Patient states she did have a bilateral knee replacement. Patient states she has had two knee replacement on the right knee. Patient denies any shortness of breath, chest pain, abdominal pain, numbness, discoloration, swelling, gait issues, and weakness. Patient states yesterday when she was cooking she had to hold on to the countertop. Patient states she has not taken any over the counter medication. Patient states she is not wanting to go to physical therapy. Patient has not reached out to an orthopedic. Patient is wanting PCP recommendations.     This RN advised patient if her symptoms continue to worsen she should go to an immediate care or emergency room to further evaluate her symptoms. Patient verbalized understanding.       Dr. Adams- Any recommendations? Orthopedic? Please advise. TY          OV-4/22/25  1. Right hip pain  Patient with continued right hip pain.  Worse with heavy physical activities.  She had x-rays of hips done last fall - mild arthritic changes.  Prescribed meloxicam by Rheumatology - takes occasionally.  I advised her to take the meloxicam once daily every day for 1 month, then go back to PRN usage.  Home physical therapy handout provided.  She is not interested in referral to look into hip joint injections.  May consider physical therapy referral    OV-2/17/25  Pain of right hip  Osteoarthritis of right hip, unspecified osteoarthritis type  -     Ortho Referral - In Network     methylPREDNISolone (MEDROL) 4 MG  Oral Tablet Therapy Pack; As directed.    Reason for call: Sick Call (Pain)  Onset: Data Unavailable

## 2025-06-25 ENCOUNTER — OFFICE VISIT (OUTPATIENT)
Dept: ORTHOPEDICS CLINIC | Facility: CLINIC | Age: 70
End: 2025-06-25
Payer: MEDICARE

## 2025-06-25 VITALS — WEIGHT: 205 LBS | BODY MASS INDEX: 34.16 KG/M2 | HEIGHT: 65 IN

## 2025-06-25 DIAGNOSIS — M76.01 GLUTEAL TENDINITIS, RIGHT HIP: ICD-10-CM

## 2025-06-25 DIAGNOSIS — M76.31 ILIOTIBIAL BAND TENDINITIS OF RIGHT SIDE: Primary | ICD-10-CM

## 2025-06-25 PROCEDURE — 1125F AMNT PAIN NOTED PAIN PRSNT: CPT | Performed by: PHYSICIAN ASSISTANT

## 2025-06-25 PROCEDURE — 3008F BODY MASS INDEX DOCD: CPT | Performed by: PHYSICIAN ASSISTANT

## 2025-06-25 PROCEDURE — 1160F RVW MEDS BY RX/DR IN RCRD: CPT | Performed by: PHYSICIAN ASSISTANT

## 2025-06-25 PROCEDURE — 1159F MED LIST DOCD IN RCRD: CPT | Performed by: PHYSICIAN ASSISTANT

## 2025-06-25 PROCEDURE — 99213 OFFICE O/P EST LOW 20 MIN: CPT | Performed by: PHYSICIAN ASSISTANT

## 2025-06-25 NOTE — PROGRESS NOTES
History of Present Illness  Jacqui Martínez is a 70 year old female with fibrotic liver who presents with right hip pain.    She experiences right hip pain that originates at the top of her hip, near the crest, and radiates down to the side of her knee. The sensation is described as if her leg is 'disconnecting' from her hip, especially when turning during activities like washing dishes. The pain has been persistent and worsens with prolonged standing, such as when cooking for her child. The pain also extends into the buttock area of her right hip.    No numbness or tingling is associated with the pain. She has a history of bilateral knee replacements and has not experienced similar pains on the left side, except around the time of her knee replacement surgeries.    She previously used meloxicam for pain relief, which was sometimes effective, but she is currently unable to take it due to a new diagnosis of fibrotic liver. She has not had any injections or physical therapy for her hip pain in the past. Recently, she started taking turmeric supplements as a natural anti-inflammatory measure. She lives in [place] and prefers to have any physical therapy sessions close to her home.    Physical Exam   patient ambulating independently in clinic today.  Alert and oriented.  No acute distress.  Tender to palpation along the greater trochanteric bursa.  Internal rotation of the right hip to 30 degrees and external rotation to 40 degrees with pain along the proximal iliac crest elicited.  Stinchfield test positive.    Results  RADIOLOGY  Hip X-ray: No significant arthritis, maintained joint space, no osteophytes (10/2024)    Assessment & Plan  Right hip pain with muscle inflammation  Chronic right hip pain with muscle inflammation, likely due to strain. No significant joint pathology on x-ray. Cautious with medication due to liver fibrosis.  - Refer to physical therapy at [facility] location.  -  Recommend turmeric as a natural anti-inflammatory supplement.  - Await specialist consultation on July 17 before considering anti-inflammatory medications.    Gautam Cardoso PA-C  [facility] Orthopedic Surgery

## 2025-06-25 NOTE — PROGRESS NOTES
The following individual(s) verbally consented to be recorded using ambient AI listening technology and understand that they can each withdraw their consent to this listening technology at any point by asking the clinician to turn off or pause the recording:    Patient name: Jacqui Martínez  Additional names:

## (undated) DIAGNOSIS — K21.9 GASTROESOPHAGEAL REFLUX DISEASE WITHOUT ESOPHAGITIS: Chronic | ICD-10-CM

## (undated) DEVICE — MEDI-VAC SUCTION HANDLE REGULAR CAPACITY: Brand: CARDINAL HEALTH

## (undated) DEVICE — WRAP COOLING KNEE W/ICE PILLOW

## (undated) DEVICE — NEEDLE SPINAL 18X3-1/2 PINK.

## (undated) DEVICE — FORCEP BIOPSY RJ4 LG CAP W/ND

## (undated) DEVICE — SOL  .9 3000ML

## (undated) DEVICE — SPECIMEN CONTAINER,POSITIVE SEAL INDICATOR, OR PACKAGED: Brand: PRECISION

## (undated) DEVICE — SOL  .9 1000ML BTL

## (undated) DEVICE — DECANTER BAG 9": Brand: MEDLINE INDUSTRIES, INC.

## (undated) DEVICE — OCCLUSIVE GAUZE STRIP OVERWRAP,3% BISMUTH TRIBROMOPHENATE IN PETROLATUM BLEND: Brand: XEROFORM

## (undated) DEVICE — SYRINGE 50ML LL TIP

## (undated) DEVICE — Device

## (undated) DEVICE — GOWN SURG AERO CHROME XXL

## (undated) DEVICE — 3M™ STERI-STRIP™ REINFORCED ADHESIVE SKIN CLOSURES, R1547, 1/2 IN X 4 IN (12 MM X 100 MM), 6 STRIPS/ENVELOPE: Brand: 3M™ STERI-STRIP™

## (undated) DEVICE — SUTURE ETHILON 3-0 FS-1

## (undated) DEVICE — ENDOSCOPY PACK UPPER: Brand: MEDLINE INDUSTRIES, INC.

## (undated) DEVICE — SYSTEM PUMPING SINGLE ACTION

## (undated) DEVICE — BANDAGE ELASTIC ACE 6\" X-LONG

## (undated) DEVICE — 3M™ RED DOT™ MONITORING ELECTRODE WITH FOAM TAPE AND STICKY GEL, 50/BAG, 20/CASE, 72/PLT 2570: Brand: RED DOT™

## (undated) DEVICE — KENDALL SCD EXPRESS SLEEVES, KNEE LENGTH, MEDIUM: Brand: KENDALL SCD

## (undated) DEVICE — LIGHT HANDLE

## (undated) DEVICE — THREADED PINS PACK: Brand: KNEE INSTRUMENTS

## (undated) DEVICE — MEDI-VAC NON-CONDUCTIVE SUCTION TUBING: Brand: CARDINAL HEALTH

## (undated) DEVICE — Device: Brand: STABLECUT®

## (undated) DEVICE — ZIPWIRE GUIDEWIRE .035X150 STR

## (undated) DEVICE — SUTURE ETHILON 2-0 FS

## (undated) DEVICE — 3M™ IOBAN™ 2 ANTIMICROBIAL INCISE DRAPE 6651EZ: Brand: IOBAN™ 2

## (undated) DEVICE — SUTURE VICRYL 0 CP-1

## (undated) DEVICE — STERILE POLYISOPRENE POWDER-FREE SURGICAL GLOVES WITH EMOLLIENT COATING: Brand: PROTEXIS

## (undated) DEVICE — HYDROGEL COATED URETERAL DILATOR: Brand: NOTTINGHAM ONE-STEP

## (undated) DEVICE — Device: Brand: DEFENDO AIR/WATER/SUCTION AND BIOPSY VALVE

## (undated) DEVICE — DRAPE,U/SHT,SPLIT,FILM,60X84,STERILE: Brand: MEDLINE

## (undated) DEVICE — 1200CC GUARDIAN II: Brand: GUARDIAN

## (undated) DEVICE — TOTAL HIP CDS: Brand: MEDLINE INDUSTRIES, INC.

## (undated) DEVICE — HOOD, PEEL-AWAY: Brand: FLYTE

## (undated) DEVICE — SUTURE ETHILON 3-0 PS-2

## (undated) DEVICE — STERILE POLYISOPRENE POWDER-FREE SURGICAL GLOVES: Brand: PROTEXIS

## (undated) DEVICE — FILTERLINE NASAL ADULT O2/CO2

## (undated) DEVICE — COTTON ROLL: Brand: DEROYAL

## (undated) DEVICE — GLOVE SURG SENSICARE SZ 6-1/2

## (undated) DEVICE — CYSTO CDS-LF: Brand: MEDLINE INDUSTRIES, INC.

## (undated) DEVICE — SUTURE VICRYL 2-0 CP-1

## (undated) DEVICE — PADDING CAST COTTON  4

## (undated) DEVICE — TOWEL OR BLU 16X26 STRL

## (undated) DEVICE — BOWL CEMENT MIX QUICK-VAC

## (undated) DEVICE — OPEN-END FLEXI-TIP URETERAL CATHETER: Brand: FLEXI-TIP

## (undated) DEVICE — TIP CLEANER: Brand: VALLEYLAB

## (undated) DEVICE — SUTURE ETHIBOND 2 V-37

## (undated) DEVICE — ZIMMER® STERILE DISPOSABLE TOURNIQUET CUFF WITH PLC, DUAL PORT, SINGLE BLADDER, 34 IN. (86 CM)

## (undated) DEVICE — 1010 S-DRAPE TOWEL DRAPE 10/BX: Brand: STERI-DRAPE™

## (undated) NOTE — Clinical Note
I had the pleasure of seeing Isaiah Merino on 2/14/2023. Please see my attached note.   Brielle Francois MD FACS [facility]--Surgery

## (undated) NOTE — Clinical Note
Thank you for this consultation! She can see me for breast exams if needed, otherwise return PRN. I ordered her next mammogram and copied you on results. Thanks, Jo Ann

## (undated) NOTE — LETTER
10/24/19        [id number]      Dear Miesha Lazar,    [address] records indicate that you have outstanding lab work and or testing that was ordered for you and has not yet been completed: Xray of Elbow and Non Fasting Lab

## (undated) NOTE — LETTER
06/29/20        [facility]  [address] [address]      Dear Cary Gonsalez,    [address] [facility] records indicate that you have outstanding lab work and or testing that was ordered for you and has not yet been completed:  Orders Placed This Encounter

## (undated) NOTE — MR AVS SNAPSHOT
[place]  [address] [facility] [address]  [address] [facility] [address] [phone]               Thank you for choosing us for your health care visit with Jyoti Shaw MD.  We are glad to serve you and happy to provide you with this s Commonly known as:  DIPROSONE           Brimonidine Tartrate 0.15 % Soln   Place 1 drop into both eyes every morning.    Commonly known as:  ALPHAGAN           calcitRIOL 0.5 MCG Caps   TAKE ONE CAPSULE BY MOUTH EVERY DAY   Commonly known as:  ROCALTROL If you've recently had a stay at the Hospital you can access your discharge instructions in [employer] by going to Visits < Admission Summaries.  If you've been to the Emergency Department or your doctor's office, you can view your past visit information in My

## (undated) NOTE — ED AVS SNAPSHOT
THE [facility] Emergency Department in [address] [facility]    Phone:  [phone]    Fax:  [address]   MRN: [id number]    Department:  THE [facility] Emergency Department in [place]   Date of Visit: IF THERE IS ANY CHANGE OR WORSENING OF YOUR CONDITION, CALL YOUR PRIMARY CARE PHYSICIAN AT ONCE OR RETURN IMMEDIATELY TO THE EMERGENCY DEPARTMENT.     If you have been prescribed any medication(s), please fill your prescription right away and begin taking t

## (undated) NOTE — LETTER
01/24/20    To whom it concerns:    Debbie Bond has a medical issue that inhibits her ability to use the gym. Please freeze her gym membership at this time until this issue is resolved, at which time we can provide notification. Thank you.     Sincerely

## (undated) NOTE — Clinical Note
Thank you for this consultation! She can see me for breast exams if needed, otherwise can return PRN. I ordered her next mammogram and copied you on results. Thanks, Jo Ann Snider

## (undated) NOTE — ED AVS SNAPSHOT
THE [facility] Emergency Department in [address] [facility]    Phone:  [phone]    Fax:  [address]   MRN: [id number]    Department:  THE [facility] Emergency Department in [place]   Date of Visit: HYDROcodone-acetaminophen 5-325 MG Tabs   Quantity:  20 tablet   Commonly known as:  NORCO   Take 1-2 tablets by mouth every 4 (four) hours as needed for Pain.        ondansetron 4 MG Tbdp   Quantity:  10 tablet   Commonly known as:  ZOFRAN-ODT   Take 1 ta You were examined and treated today on an urgent basis only. This was not a substitute for ongoing medical care. Often, one Emergency Department visit does not uncover every injury or illness.  If you have been referred to a primary care or a specialist ph Liliana Camacho [address] ([address]) [address]  [facility] (Ethridge Schwab) [phone] [address] [facility] [address] ([address]) [phone]                Additional Information       We are concerned for your o Dose information is transmitted to the  [facility]) NR[employer] ([address]) which includes the Dose Index Registry.      PATIENT STATED HISTORY: (As transcribed by Technologist)  Patient had pain to the right and left office, you can view your past visit information in [employer] by going to Visits < Visit Summaries. [employer] questions? Call [phone] for help. [employer] is NOT to be used for urgent needs. For medical emergencies, dial 911.

## (undated) NOTE — MR AVS SNAPSHOT
[place]  [address] [facility] [facility] [address]  [address] [facility] [address] [phone]               Thank you for choosing us for your health care visit with Erasto Rachel MD.  We are glad to serve you and happy to provide you with this summa Assoc Dx:  JUANCHO (generalized anxiety disorder) [F41.1]          Reason for Today's Visit     Other           Medical Issues Discussed Today     Adjustment insomnia    Arthritis, lumbar spine    Chronic bilateral low back pain without sciatica      Instruct RADHA THIN LAYER EXT AA BID           indomethacin 50 MG Caps   Take 1 capsule (50 mg total) by mouth 2 (two) times daily as needed (pain). What changed:  Another medication with the same name was removed.  Continue taking this medication, and follow the Carlo Serna Visit [facility] online at  [email or web address]

## (undated) NOTE — MR AVS SNAPSHOT
[place]  [address] [address]  [address] [facility] [address] [phone]               Thank you for choosing us for your health care visit with Maggie Lala MD.  We are glad to serve you and happy to provide you with this summa Esomeprazole Magnesium 40 MG Cpdr   Take 1 capsule (40 mg total) by mouth every morning before breakfast.   Commonly known as:  NEXIUM           furosemide 20 MG Tabs   TAKE 1 TABLET BY MOUTH EVERY DAY AS NEEDED FOR LEG SWELLING   Commonly known as:  LISET Visit [employer]  You can access your MyChart to more actively manage your health care and view more details from this visit by going to [email or web address].   If you've recently had a stay at the Hospital you can access your discharge instructions i track your progress   You don’t need to join a gym. Home exercises work great.  Put more priority on exercise in your life                    [facility] online at  [email or web address]

## (undated) NOTE — IP AVS SNAPSHOT
[facility] [place] [address] [place], [address] ~ [phone]                Discharge Summary   5/4/2017    Miguel Chow           Admission Information        Provider Department    5/4/2017 Ruchi Rivero, MD Danis Martínez / Willian Mahoney Commonly known as:  ALPHAGAN        Place 1 drop into both eyes every morning.       [    ]    [    ]    [    ]    [    ]       calcitRIOL 0.5 MCG Caps   Commonly known as:  ROCALTROL        TAKE ONE CAPSULE BY MOUTH EVERY DAY    Felix Villarreal     [    ]    [ Balwinder Hidden     [    ]    [    ]    [    ]    [    ]            Where to Get Your Medications      Information about where to get these medications is not yet available     !  Ask your nurse or doctor about these medications    - Phenazopyridine HCl 97.5 M Slight burning on urination may be experienced with the first few urinations. Scant blood may appear in the urine and will clear in a few days. Drinking water and clear liquids is encouraged.   .  Local Anesthesia:  Resume your normal diet and activity as TD 6/23/2008      Recent Hematology Lab Results  (Last 3 results in the past 90 days)    WBC RBC Hemoglobin Hematocrit MCV MCH MCHC RDW Platelet MPV    (23/06/03)  8.3 (04/14/17)  4.69 (04/14/17)  13.6 (04/14/17)  39.4 (04/14/17)  84.0 -- -- -- (04/14/17) Visit Information        Department Dept Phone    5/4/2017 12:48 PM  Pre-Op / [facility] [phone]         We want to hear from you       We want to hear from you, please share your experience with us by returning the survey you will receive in the mail.   Kermit Goldberg

## (undated) NOTE — Clinical Note
Rock Arthur, this is a new patient to see you. Both Ramiro and I are in agreement with the diagnosis of meralgia paresthetica on the L side. I spoke w/ Laura Odom on Monday. The patient will call your office to schedule an eval for nerve blockade.  Demetrius

## (undated) NOTE — Clinical Note
Call patient. Tell her that Neurology recommendation is to repeat the MRI in 11/2018. Dotty Stern. Jatinder Rivero MD Diplomate, American Board of Internal Medicine [facility] [address] [place] [address],[address], [address], [facility], [address] [place] [address] T: [id number]; F: [phone]

## (undated) NOTE — Clinical Note
Thank you for the consult. Meningioma is quite small and stable since 2017. I will see in 1 year time and repeat MRI if needed.